# Patient Record
Sex: FEMALE | Race: BLACK OR AFRICAN AMERICAN | Employment: UNEMPLOYED | URBAN - METROPOLITAN AREA
[De-identification: names, ages, dates, MRNs, and addresses within clinical notes are randomized per-mention and may not be internally consistent; named-entity substitution may affect disease eponyms.]

---

## 2017-06-12 ENCOUNTER — HOSPITAL ENCOUNTER (OUTPATIENT)
Dept: LAB | Age: 32
Discharge: HOME | End: 2017-06-12

## 2017-06-12 LAB
HBV SURFACE AB SER QL IA: POSITIVE
HBV SURFACE AB SERPL IA-ACNC: 19.46 MIU/ML
HEP BS AB COMMENT,HBSAC: NORMAL
RUBV IGG SER-IMP: NORMAL
T-SPOT TB TEST (EMPLOYEE),XTBE: NORMAL

## 2017-06-12 PROCEDURE — 86735 MUMPS ANTIBODY: CPT | Performed by: EMERGENCY MEDICINE

## 2017-06-12 PROCEDURE — 36415 COLL VENOUS BLD VENIPUNCTURE: CPT | Performed by: EMERGENCY MEDICINE

## 2017-06-12 PROCEDURE — 86762 RUBELLA ANTIBODY: CPT | Performed by: EMERGENCY MEDICINE

## 2017-06-12 PROCEDURE — 86706 HEP B SURFACE ANTIBODY: CPT | Performed by: EMERGENCY MEDICINE

## 2017-06-12 PROCEDURE — 86787 VARICELLA-ZOSTER ANTIBODY: CPT | Performed by: EMERGENCY MEDICINE

## 2017-06-12 PROCEDURE — 86765 RUBEOLA ANTIBODY: CPT | Performed by: EMERGENCY MEDICINE

## 2017-06-13 LAB
MEV IGG SER IA-ACNC: >300 AU/ML
MUV IGG SER IA-ACNC: 47.3 AU/ML
VZV IGG SER IA-ACNC: 799 INDEX

## 2018-03-12 ENCOUNTER — OFFICE VISIT (OUTPATIENT)
Dept: FAMILY MEDICINE CLINIC | Age: 33
End: 2018-03-12

## 2018-03-12 VITALS
TEMPERATURE: 98.1 F | DIASTOLIC BLOOD PRESSURE: 97 MMHG | WEIGHT: 266 LBS | SYSTOLIC BLOOD PRESSURE: 141 MMHG | HEIGHT: 68 IN | OXYGEN SATURATION: 97 % | BODY MASS INDEX: 40.32 KG/M2 | RESPIRATION RATE: 17 BRPM | HEART RATE: 92 BPM

## 2018-03-12 DIAGNOSIS — E78.5 HYPERLIPIDEMIA, UNSPECIFIED HYPERLIPIDEMIA TYPE: ICD-10-CM

## 2018-03-12 DIAGNOSIS — K21.9 GASTROESOPHAGEAL REFLUX DISEASE, ESOPHAGITIS PRESENCE NOT SPECIFIED: ICD-10-CM

## 2018-03-12 DIAGNOSIS — E55.9 VITAMIN D DEFICIENCY: ICD-10-CM

## 2018-03-12 DIAGNOSIS — R03.0 ELEVATED BP WITHOUT DIAGNOSIS OF HYPERTENSION: ICD-10-CM

## 2018-03-12 DIAGNOSIS — R73.03 PREDIABETES: ICD-10-CM

## 2018-03-12 RX ORDER — VALACYCLOVIR HYDROCHLORIDE 1 G/1
TABLET, FILM COATED ORAL DAILY
COMMUNITY
End: 2018-08-09 | Stop reason: SDUPTHER

## 2018-03-12 RX ORDER — FLUTICASONE FUROATE AND VILANTEROL 200; 25 UG/1; UG/1
1 POWDER RESPIRATORY (INHALATION) DAILY
COMMUNITY
End: 2018-09-10 | Stop reason: SDUPTHER

## 2018-03-12 RX ORDER — MONTELUKAST SODIUM 10 MG/1
10 TABLET ORAL DAILY
COMMUNITY
End: 2018-08-09 | Stop reason: SDUPTHER

## 2018-03-12 RX ORDER — FAMOTIDINE 20 MG/1
20 TABLET, FILM COATED ORAL AS NEEDED
COMMUNITY
End: 2021-04-24

## 2018-03-12 RX ORDER — ALBUTEROL SULFATE 90 UG/1
AEROSOL, METERED RESPIRATORY (INHALATION)
COMMUNITY
End: 2018-04-10 | Stop reason: SDUPTHER

## 2018-03-12 RX ORDER — METFORMIN HYDROCHLORIDE 500 MG/1
TABLET, FILM COATED, EXTENDED RELEASE ORAL DAILY
COMMUNITY
End: 2018-04-18 | Stop reason: SDUPTHER

## 2018-03-12 RX ORDER — CETIRIZINE HCL 10 MG
TABLET ORAL
COMMUNITY

## 2018-03-12 NOTE — MR AVS SNAPSHOT
Cinthia Pinzon [place] [address] [facility] [place]. [address] [facility] [id number] 
[phone] Patient: Supa Piper MRN: [id number] Visit Information Date & Time Provider Department Dept. Phone Encounter #  
 3/12/2018  3:15 PM Ravi Lee, [address] [id number] Follow-up Instructions Return in about 1 month (around 4/12/2018). Upcoming Health Maintenance Date Due DTaP/Tdap/Td series (1 - Tdap) 8/31/2004 PAP AKA CERVICAL CYTOLOGY 8/31/2004 Allergies as of 3/12/2018  Review Complete On: 3/12/2018 By: Ravi Lee MD  
  
 Severity Noted Reaction Type Reactions Latex  03/12/2018    Atopic Dermatitis Nubain [Nalbuphine] Medium 03/12/2018    Hives Sulfa (Sulfonamide Antibiotics) Medium 03/12/2018    Hives Shellfish Derived  03/12/2018    Hives Current Immunizations  Never Reviewed Name Date Influenza Nasal Vaccine (Quad) 10/12/2017 Not reviewed this visit You Were Diagnosed With   
  
 Codes Comments Class 3 obesity without serious comorbidity with body mass index (BMI) of 40.0 to 44.9 in adult, unspecified obesity type ([facility] [address]    -  Primary ICD-10-CM: E66.9, Z68.41 
ICD-9-CM: 278.00, V85.41 Hyperlipidemia, unspecified hyperlipidemia type     ICD-10-CM: E78.5 ICD-9-CM: 272.4 Vitamin D deficiency     ICD-10-CM: E55.9 ICD-9-CM: 268.9 Gastroesophageal reflux disease, esophagitis presence not specified     ICD-10-CM: K21.9 ICD-9-CM: 530.81 Elevated BP without diagnosis of hypertension     ICD-10-CM: R03.0 ICD-9-CM: 796.2 Prediabetes     ICD-10-CM: R73.03 
ICD-9-CM: 790.29 Vitals BP Pulse Temp Resp Height(growth percentile) Weight(growth percentile) (!) 141/97 92 98.1 °F (36.7 °C) (Oral) 17 5' 8\" (1.727 m) 266 lb (120.7 kg) LMP SpO2 BMI Smoking Status 02/17/2017 97% 40.45 kg/m2 Never Smoker Vitals History BMI and BSA Data Body Mass Index Body Surface Area 40.45 kg/m 2 2.41 m 2 Preferred Pharmacy Pharmacy Name Phone [facility] [address] [place], [facility] [phone] Your Updated Medication List  
  
   
This list is accurate as of 3/12/18  4:11 PM.  Always use your most recent med list.  
  
  
  
  
 albuterol 90 mcg/actuation inhaler Commonly known as:  PROVENTIL HFA, VENTOLIN HFA, PROAIR HFA Take  by inhalation. BREO ELLIPTA 200-25 mcg/dose inhaler Generic drug:  fluticasone-vilanterol Take 1 Puff by inhalation daily. metFORMIN 500 mg Tg24 24 hour tablet Commonly known asGerard Ana Luisa ER Take  by mouth daily. PEPCID 20 mg tablet Generic drug:  famotidine Take 20 mg by mouth as needed. SINGULAIR 10 mg tablet Generic drug:  montelukast  
Take 10 mg by mouth daily. VALTREX 1 gram tablet Generic drug:  valACYclovir Take  by mouth daily. ZyrTEC 10 mg tablet Generic drug:  cetirizine Take  by mouth. Follow-up Instructions Return in about 1 month (around 4/12/2018). To-Do List   
 03/12/2018 Lab:  CBC WITH AUTOMATED DIFF   
  
 03/12/2018 Lab:  HEMOGLOBIN A1C WITH EAG   
  
 03/12/2018 Lab:  LIPID PANEL   
  
 03/12/2018 Lab:  METABOLIC PANEL, COMPREHENSIVE   
  
 03/12/2018 Lab:  TSH 3RD GENERATION   
  
 03/12/2018 Lab:  VITAMIN D, 25 HYDROXY Patient Instructions Starting a Weight Loss Plan: Care Instructions Your Care Instructions If you are thinking about losing weight, it can be hard to know where to start. Your doctor can help you set up a weight loss plan that best meets your needs. You may want to take a class on nutrition or exercise, or join a weight loss support group.  If you have questions about how to make changes to your eating or exercise habits, ask your doctor about seeing a registered dietitian or an exercise specialist. 
 It can be a big challenge to lose weight. But you do not have to make huge changes at once. Make small changes, and stick with them. When those changes become habit, add a few more changes. If you do not think you are ready to make changes right now, try to pick a date in the future. Make an appointment to see your doctor to discuss whether the time is right for you to start a plan. Follow-up care is a key part of your treatment and safety. Be sure to make and go to all appointments, and call your doctor if you are having problems. It's also a good idea to know your test results and keep a list of the medicines you take. How can you care for yourself at home? · Set realistic goals. Many people expect to lose much more weight than is likely. A weight loss of 5% to 10% of your body weight may be enough to improve your health. · Get family and friends involved to provide support. Talk to them about why you are trying to lose weight, and ask them to help. They can help by participating in exercise and having meals with you, even if they may be eating something different. · Find what works best for you. If you do not have time or do not like to cook, a program that offers meal replacement bars or shakes may be better for you. Or if you like to prepare meals, finding a plan that includes daily menus and recipes may be best. 
· Ask your doctor about other health professionals who can help you achieve your weight loss goals. ¨ A dietitian can help you make healthy changes in your diet. ¨ An exercise specialist or  can help you develop a safe and effective exercise program. 
¨ A counselor or psychiatrist can help you cope with issues such as depression, anxiety, or family problems that can make it hard to focus on weight loss. · Consider joining a support group for people who are trying to lose weight. Your doctor can suggest groups in your area. Where can you learn more? Go to http://mateo-claudia.info/. Enter O033 in the search box to learn more about \"Starting a Weight Loss Plan: Care Instructions. \" Current as of: October 13, 2016 Content Version: 11.4 © 5041-8973 Minefold. Care instructions adapted under license by EximForce (which disclaims liability or warranty for this information). If you have questions about a medical condition or this instruction, always ask your healthcare professional. [address] any warranty or liability for your use of this information. Learning About Low-Carbohydrate Diets for Weight Loss What is a low-carbohydrate diet? Low-carb diets avoid foods that are high in carbohydrate. These high-carb foods include pasta, bread, rice, cereal, fruits, and starchy vegetables. Instead, these diets usually have you eat foods that are high in fat and protein. Many people lose weight quickly on a low-carb diet. But the early weight loss is water. People on this diet often gain the weight back after they start eating carbs again. Not all diet plans are safe or work well. A lot of the evidence shows that low-carb diets aren't healthy. That's because these diets often don't include healthy foods like fruits and vegetables. Losing weight safely means balancing protein, fat, and carbs with every meal and snack. And low-carb diets don't always provide the vitamins, minerals, and fiber you need. If you have a serious medical condition, talk to your doctor before you try any diet. These conditions include kidney disease, heart disease, type 2 diabetes, high cholesterol, and high blood pressure. If you are pregnant, it may not be safe for your baby if you are on a low-carb diet. How can you lose weight safely? You might have heard that a diet plan helped another person lose weight. But that doesn't mean that it will work for you. It is very hard to stay on a diet that includes lots of big changes in your eating habits. If you want to get to a healthy weight and stay there, making healthy lifestyle changes will often work better than dieting. These steps can help. · Make a plan for change. Work with your doctor to create a plan that is right for you. · See a dietitian. He or she can show you how to make healthy changes in your eating habits. · Manage stress. If you have a lot of stress in your life, it can be hard to focus on making healthy changes to your daily habits. · Track your food and activity. You are likely to do better at losing weight if you keep track of what you eat and what you do. Follow-up care is a key part of your treatment and safety. Be sure to make and go to all appointments, and call your doctor if you are having problems. It's also a good idea to know your test results and keep a list of the medicines you take. Where can you learn more? Go to http://mateo-claudia.info/. Enter A121 in the search box to learn more about \"Learning About Low-Carbohydrate Diets for Weight Loss. \" Current as of: May 12, 2017 Content Version: 11.4 © 2495-8644 Healthwise, Tripvisto. Care instructions adapted under license by KidsCash (which disclaims liability or warranty for this information). If you have questions about a medical condition or this instruction, always ask your healthcare professional. [place] [address] any warranty or liability for your use of this information. Learning About Cutting Calories How do calories affect your weight? Food gives your body energy. Energy from the food you eat is measured in calories. This energy keeps your heart beating, your brain active, and your muscles working. Your body needs a certain number of calories each day. After your body uses the calories it needs, it stores extra calories as fat. To lose weight safely, you have to eat fewer calories while eating in a healthy way. How many calories do you need each day? The more active you are, the more calories you need. When you are less active, you need fewer calories. How many calories you need each day also depends on several things, including your age and whether you are male or female. Here are some general guidelines for adults: 
· Less active women and older adults need 1,600 to 2,000 calories each day. · Active women and less active men need 2,000 to 2,400 calories each day. · Active men need 2,400 to 3,000 calories each day. How can you cut calories and eat healthy meals? Whole grains, vegetables and fruits, and dried beans are good lower-calorie foods. They give you lots of nutrients and fiber. And they fill you up. Sweets, energy drinks, and soda pop are high in calories. They give you few nutrients and no fiber. Try to limit soda pop, fruit juice, and energy drinks. Drink water instead. Some fats can be part of a healthy diet. But cutting back on fats from highly processed foods like fast foods and many snack foods is a good way to lower the calories in your diet. Also, use smaller amounts of fats like butter, margarine, salad dressing, and mayonnaise. Add fresh garlic, lemon, or herbs to your meals to add flavor without adding fat. Meats and dairy products can be a big source of hidden fats. Try to choose lean or low-fat versions of these products. Fat-free cookies, candies, chips, and frozen treats can still be high in sugar and calories. Some fat-free foods have more calories than regular ones. Eat fat-free treats in moderation, as you would other foods. If your favorite foods are high in fat, salt, sugar, or calories, limit how often you eat them. Eat smaller servings, or look for healthy substitutes. Fill up on fruits, vegetables, and whole grains. Eating at home · Use meat as a side dish instead of as the main part of your meal. 
· Try main dishes that use whole wheat pasta, brown rice, dried beans, or vegetables. · Find ways to cook with little or no fat, such as broiling, steaming, or grilling. · Use cooking spray instead of oil. If you use oil, use a monounsaturated oil, such as canola or olive oil. · Trim fat from meats before you cook them. · Drain off fat after you brown the meat or while you roast it. · Chill soups and stews after you cook them. Then skim the fat off the top after it hardens. Eating out · Order foods that are broiled or poached rather than fried or breaded. · Cut back on the amount of butter or margarine that you use on bread. · Order sauces, gravies, and salad dressings on the side, and use only a little. · When you order pasta, choose tomato sauce rather than cream sauce. · Ask for salsa with your baked potato instead of sour cream, butter, cheese, or tony. · Order meals in a small size instead of upgrading to a large. · Share an entree, or take part of your food home to eat as another meal. 
· Share appetizers and desserts. Where can you learn more? Go to http://mateo-claudia.info/. Enter 99 [id number] in the search box to learn more about \"Learning About Cutting Calories. \" Current as of: May 12, 2017 Content Version: 11.4 © 4247-6078 Vimbly. Care instructions adapted under license by Fluid-1 (which disclaims liability or warranty for this information). If you have questions about a medical condition or this instruction, always ask your healthcare professional. [place] [address] any warranty or liability for your use of this information. DASH Diet: Care Instructions Your Care Instructions The DASH diet is an eating plan that can help lower your blood pressure. DASH stands for Dietary Approaches to Stop Hypertension. Hypertension is high blood pressure. The DASH diet focuses on eating foods that are high in calcium, potassium, and magnesium. These nutrients can lower blood pressure. The foods that are highest in these nutrients are fruits, vegetables, low-fat dairy products, nuts, seeds, and legumes. But taking calcium, potassium, and magnesium supplements instead of eating foods that are high in those nutrients does not have the same effect. The DASH diet also includes whole grains, fish, and poultry. The DASH diet is one of several lifestyle changes your doctor may recommend to lower your high blood pressure. Your doctor may also want you to decrease the amount of sodium in your diet. Lowering sodium while following the DASH diet can lower blood pressure even further than just the DASH diet alone. Follow-up care is a key part of your treatment and safety. Be sure to make and go to all appointments, and call your doctor if you are having problems. It's also a good idea to know your test results and keep a list of the medicines you take. How can you care for yourself at home? Following the DASH diet · Eat 4 to 5 servings of fruit each day. A serving is 1 medium-sized piece of fruit, ½ cup chopped or canned fruit, 1/4 cup dried fruit, or 4 ounces (½ cup) of fruit juice. Choose fruit more often than fruit juice. · Eat 4 to 5 servings of vegetables each day. A serving is 1 cup of lettuce or raw leafy vegetables, ½ cup of chopped or cooked vegetables, or 4 ounces (½ cup) of vegetable juice. Choose vegetables more often than vegetable juice. · Get 2 to 3 servings of low-fat and fat-free dairy each day. A serving is 8 ounces of milk, 1 cup of yogurt, or 1 ½ ounces of cheese. · Eat 6 to 8 servings of grains each day. A serving is 1 slice of bread, 1 ounce of dry cereal, or ½ cup of cooked rice, pasta, or cooked cereal. Try to choose whole-grain products as much as possible. · Limit lean meat, poultry, and fish to 2 servings each day.  A serving is 3 ounces, about the size of a deck of cards. · Eat 4 to 5 servings of nuts, seeds, and legumes (cooked dried beans, lentils, and split peas) each week. A serving is 1/3 cup of nuts, 2 tablespoons of seeds, or ½ cup of cooked beans or peas. · Limit fats and oils to 2 to 3 servings each day. A serving is 1 teaspoon of vegetable oil or 2 tablespoons of salad dressing. · Limit sweets and added sugars to 5 servings or less a week. A serving is 1 tablespoon jelly or jam, ½ cup sorbet, or 1 cup of lemonade. · Eat less than 2,300 milligrams (mg) of sodium a day. If you limit your sodium to 1,500 mg a day, you can lower your blood pressure even more. Tips for success · Start small. Do not try to make dramatic changes to your diet all at once. You might feel that you are missing out on your favorite foods and then be more likely to not follow the plan. Make small changes, and stick with them. Once those changes become habit, add a few more changes. · Try some of the following: ¨ Make it a goal to eat a fruit or vegetable at every meal and at snacks. This will make it easy to get the recommended amount of fruits and vegetables each day. ¨ Try yogurt topped with fruit and nuts for a snack or healthy dessert. ¨ Add lettuce, tomato, cucumber, and onion to sandwiches. ¨ Combine a ready-made pizza crust with low-fat mozzarella cheese and lots of vegetable toppings. Try using tomatoes, squash, spinach, broccoli, carrots, cauliflower, and onions. ¨ Have a variety of cut-up vegetables with a low-fat dip as an appetizer instead of chips and dip. ¨ Sprinkle sunflower seeds or chopped almonds over salads. Or try adding chopped walnuts or almonds to cooked vegetables. ¨ Try some vegetarian meals using beans and peas. Add garbanzo or kidney beans to salads. Make burritos and tacos with mashed powers beans or black beans. Where can you learn more? Go to http://galindo-claudia.info/. Enter P420 in the search box to learn more about \"DASH Diet: Care Instructions. \" Current as of: September 21, 2016 Content Version: 11.4 © 0481-9578 Identity Engines. Care instructions adapted under license by Solidagex (which disclaims liability or warranty for this information). If you have questions about a medical condition or this instruction, always ask your healthcare professional. [address] any warranty or liability for your use of this information. Low Sodium Diet (2,000 Milligram): Care Instructions Your Care Instructions Too much sodium causes your body to hold on to extra water. This can raise your blood pressure and force your heart and kidneys to work harder. In very serious cases, this could cause you to be put in the hospital. It might even be life-threatening. By limiting sodium, you will feel better and lower your risk of serious problems. The most common source of sodium is salt. People get most of the salt in their diet from canned, prepared, and packaged foods. Fast food and restaurant meals also are very high in sodium. Your doctor will probably limit your sodium to less than 2,000 milligrams (mg) a day. This limit counts all the sodium in prepared and packaged foods and any salt you add to your food. Follow-up care is a key part of your treatment and safety. Be sure to make and go to all appointments, and call your doctor if you are having problems. It's also a good idea to know your test results and keep a list of the medicines you take. How can you care for yourself at home? Read food labels · Read labels on cans and food packages. The labels tell you how much sodium is in each serving. Make sure that you look at the serving size. If you eat more than the serving size, you have eaten more sodium. · Food labels also tell you the Percent Daily Value for sodium. Choose products with low Percent Daily Values for sodium. · Be aware that sodium can come in forms other than salt, including monosodium glutamate (MSG), sodium citrate, and sodium bicarbonate (baking soda). MSG is often added to Asian food. When you eat out, you can sometimes ask for food without MSG or added salt. Buy low-sodium foods · Buy foods that are labeled \"unsalted\" (no salt added), \"sodium-free\" (less than 5 mg of sodium per serving), or \"low-sodium\" (less than 140 mg of sodium per serving). Foods labeled \"reduced-sodium\" and \"light sodium\" may still have too much sodium. Be sure to read the label to see how much sodium you are getting. · Buy fresh vegetables, or frozen vegetables without added sauces. Buy low-sodium versions of canned vegetables, soups, and other canned goods. Prepare low-sodium meals · Cut back on the amount of salt you use in cooking. This will help you adjust to the taste. Do not add salt after cooking. One teaspoon of salt has about 2,300 mg of sodium. · Take the salt shaker off the table. · Flavor your food with garlic, lemon juice, onion, vinegar, herbs, and spices. Do not use soy sauce, lite soy sauce, steak sauce, onion salt, garlic salt, celery salt, mustard, or ketchup on your food. · Use low-sodium salad dressings, sauces, and ketchup. Or make your own salad dressings and sauces without adding salt. · Use less salt (or none) when recipes call for it. You can often use half the salt a recipe calls for without losing flavor. Other foods such as rice, pasta, and grains do not need added salt. · Rinse canned vegetables, and cook them in fresh water. This removes some-but not all-of the salt. · Avoid water that is naturally high in sodium or that has been treated with water softeners, which add sodium. Call your local water company to find out the sodium content of your water supply. If you buy bottled water, read the label and choose a sodium-free brand. Avoid high-sodium foods · Avoid eating: ¨ Smoked, cured, salted, and canned meat, fish, and poultry. ¨ Ham, tony, hot dogs, and luncheon meats. ¨ Regular, hard, and processed cheese and regular peanut butter. ¨ Crackers with salted tops, and other salted snack foods such as pretzels, chips, and salted popcorn. ¨ Frozen prepared meals, unless labeled low-sodium. ¨ Canned and dried soups, broths, and bouillon, unless labeled sodium-free or low-sodium. ¨ Canned vegetables, unless labeled sodium-free or low-sodium. ¨ Western Nadia fries, pizza, tacos, and other fast foods. ¨ Pickles, olives, ketchup, and other condiments, especially soy sauce, unless labeled sodium-free or low-sodium. Where can you learn more? Go to http://mateo-claudia.info/. Enter N453 in the search box to learn more about \"Low Sodium Diet (2,000 Milligram): Care Instructions. \" Current as of: May 12, 2017 Content Version: 11.4 © 1884-5342 [email or web address]. Care instructions adapted under license by Get Together (which disclaims liability or warranty for this information). If you have questions about a medical condition or this instruction, always ask your healthcare professional. [address] any warranty or liability for your use of this information. How to Read a Food Label to Limit Sodium: Care Instructions Your Care Instructions Sodium causes your body to hold on to extra water. This can raise your blood pressure and force your heart and kidneys to work harder. In very serious cases, this could cause you to be put in the hospital. It might even be life-threatening. By limiting sodium, you will feel better and lower your risk of serious problems. Processed foods, fast food, and restaurant foods are the major sources of dietary sodium. The most common name for sodium is salt. Try to limit how much sodium you eat to less than 2,300 milligrams (mg) a day.  If you limit your sodium to 1,500 mg a day, you can lower your blood pressure even more. This limit counts all the salt that you eat in foods you cook or in packaged foods. Keep a list of everything you eat and drink. Follow-up care is a key part of your treatment and safety. Be sure to make and go to all appointments, and call your doctor if you are having problems. It's also a good idea to know your test results and keep a list of the medicines you take. How can you care for yourself at home? Read ingredient lists on food labels · Read the list of ingredients on food labels to help you find how much sodium is in a food. The label lists the ingredients in a food in descending order (from the most to the least). If salt or sodium is high on the list, there may be a lot of sodium in the food. · Know that sodium has different names. Sodium is also called monosodium glutamate (MSG, common in [facility] food), sodium citrate, sodium alginate, sodium hydroxide, and sodium phosphate. Read Nutrition Facts labels · On most foods, there is a Nutrition Facts label. This will tell you how much sodium is in one serving of food. Look at both the serving size and the sodium amount. The serving size is located at the top of the label, usually right under the \"Nutrition Facts\" title. The amount of sodium is given in the list under the title. It is given in milligrams (mg). ¨ Check the serving size carefully. A single serving is often very small, and you may eat more than one serving. If this is the case, you will eat more sodium than listed on the label. For example, if the serving size for a canned soup is 1 cup and the sodium amount is 470 mg, if you have 2 cups you will eat 940 mg of sodium. · The nutrition facts for fresh fruits and vegetables are not listed on the food. They may be listed somewhere in the store. These foods usually have no sodium or low sodium.  
· The Nutrition Facts label also gives you the Percent Daily Value for sodium. This is how much of the recommended amount of sodium a serving contains. The daily value for sodium is less than 2,300 mg. So if the Percent Daily Value says 50%, this means one serving is giving you half of this, or 1,150 mg. Buy low-sodium foods · Look for foods that are made with less sodium. Watch for the following words on the label. ¨ \"Unsalted\" means there is no sodium added to the food. But there may be sodium already in the food naturally. ¨ \"Sodium-free\" means a serving has less than 5 mg of sodium. ¨ \"Very low sodium\" means a serving has 35 mg or less of sodium. ¨ \"Low-sodium\" means a serving has 140 mg or less of sodium. · \"Reduced-sodium\" means that there is 25% less sodium than what the food normally has. This is still usually too much sodium. Try not to buy foods with this on the label. · Buy fresh vegetables, or frozen vegetables without added sauces. Buy low-sodium versions of canned vegetables, soups, and other canned goods. Where can you learn more? Go to http://mateo-claudia.info/. Enter 26 [id number] in the search box to learn more about \"How to Read a Food Label to Limit Sodium: Care Instructions. \" Current as of: May 12, 2017 Content Version: 11.4 © 0782-1419 [employer]. Care instructions adapted under license by [employer] (which disclaims liability or warranty for this information). If you have questions about a medical condition or this instruction, always ask your healthcare professional. [place] [address] any warranty or liability for your use of this information. Introducing [facility]! Dear Batsheva Schafer: Thank you for requesting a [employer] account. Our records indicate that you already have an active [employer] account. You can access your account anytime at [email or web address]. [employer] Did you know that you can access your hospital and ER discharge instructions at any time in Spark[employer]yt? You can also review all of your test results from your hospital stay or ER visit. Additional Information If you have questions, please visit the Frequently Asked Questions section of the [email or web address] website at [email or web address] Remember, [email or web address] is NOT to be used for urgent needs. For medical emergencies, dial 911. Now available from your iPhone and Android! Please provide this summary of care documentation to your next provider. If you have any questions after today's visit, please call [phone].

## 2018-03-12 NOTE — PATIENT INSTRUCTIONS
Starting a Weight Loss Plan: Care Instructions  Your Care Instructions    If you are thinking about losing weight, it can be hard to know where to start. Your doctor can help you set up a weight loss plan that best meets your needs. You may want to take a class on nutrition or exercise, or join a weight loss support group. If you have questions about how to make changes to your eating or exercise habits, ask your doctor about seeing a registered dietitian or an exercise specialist.  It can be a big challenge to lose weight. But you do not have to make huge changes at once. Make small changes, and stick with them. When those changes become habit, add a few more changes. If you do not think you are ready to make changes right now, try to pick a date in the future. Make an appointment to see your doctor to discuss whether the time is right for you to start a plan. Follow-up care is a key part of your treatment and safety. Be sure to make and go to all appointments, and call your doctor if you are having problems. It's also a good idea to know your test results and keep a list of the medicines you take. How can you care for yourself at home? · Set realistic goals. Many people expect to lose much more weight than is likely. A weight loss of 5% to 10% of your body weight may be enough to improve your health. · Get family and friends involved to provide support. Talk to them about why you are trying to lose weight, and ask them to help. They can help by participating in exercise and having meals with you, even if they may be eating something different. · Find what works best for you. If you do not have time or do not like to cook, a program that offers meal replacement bars or shakes may be better for you. Or if you like to prepare meals, finding a plan that includes daily menus and recipes may be best.  · Ask your doctor about other health professionals who can help you achieve your weight loss goals.   ¨ A dietitian can help you make healthy changes in your diet. ¨ An exercise specialist or  can help you develop a safe and effective exercise program.  ¨ A counselor or psychiatrist can help you cope with issues such as depression, anxiety, or family problems that can make it hard to focus on weight loss. · Consider joining a support group for people who are trying to lose weight. Your doctor can suggest groups in your area. Where can you learn more? Go to http://mateo-claudia.info/. Enter L141 in the search box to learn more about \"Starting a Weight Loss Plan: Care Instructions. \"  Current as of: October 13, 2016  Content Version: 11.4  © 9549-2918 [employer]. Care instructions adapted under license by [employer] (which disclaims liability or warranty for this information). If you have questions about a medical condition or this instruction, always ask your healthcare professional. [place] [address] any warranty or liability for your use of this information. Learning About Low-Carbohydrate Diets for Weight Loss  What is a low-carbohydrate diet? Low-carb diets avoid foods that are high in carbohydrate. These high-carb foods include pasta, bread, rice, cereal, fruits, and starchy vegetables. Instead, these diets usually have you eat foods that are high in fat and protein. Many people lose weight quickly on a low-carb diet. But the early weight loss is water. People on this diet often gain the weight back after they start eating carbs again. Not all diet plans are safe or work well. A lot of the evidence shows that low-carb diets aren't healthy. That's because these diets often don't include healthy foods like fruits and vegetables. Losing weight safely means balancing protein, fat, and carbs with every meal and snack. And low-carb diets don't always provide the vitamins, minerals, and fiber you need.   If you have a serious medical condition, talk to your doctor before you try any diet. These conditions include kidney disease, heart disease, type 2 diabetes, high cholesterol, and high blood pressure. If you are pregnant, it may not be safe for your baby if you are on a low-carb diet. How can you lose weight safely? You might have heard that a diet plan helped another person lose weight. But that doesn't mean that it will work for you. It is very hard to stay on a diet that includes lots of big changes in your eating habits. If you want to get to a healthy weight and stay there, making healthy lifestyle changes will often work better than dieting. These steps can help. · Make a plan for change. Work with your doctor to create a plan that is right for you. · See a dietitian. He or she can show you how to make healthy changes in your eating habits. · Manage stress. If you have a lot of stress in your life, it can be hard to focus on making healthy changes to your daily habits. · Track your food and activity. You are likely to do better at losing weight if you keep track of what you eat and what you do. Follow-up care is a key part of your treatment and safety. Be sure to make and go to all appointments, and call your doctor if you are having problems. It's also a good idea to know your test results and keep a list of the medicines you take. Where can you learn more? Go to http://mateo-claudia.info/. Enter A121 in the search box to learn more about \"Learning About Low-Carbohydrate Diets for Weight Loss. \"  Current as of: May 12, 2017  Content Version: 11.4  © 3637-8229 [employer]. Care instructions adapted under license by [employer] (which disclaims liability or warranty for this information).  If you have questions about a medical condition or this instruction, always ask your healthcare professional. [address] any warranty or liability for your use of this information. Learning About Cutting Calories  How do calories affect your weight? Food gives your body energy. Energy from the food you eat is measured in calories. This energy keeps your heart beating, your brain active, and your muscles working. Your body needs a certain number of calories each day. After your body uses the calories it needs, it stores extra calories as fat. To lose weight safely, you have to eat fewer calories while eating in a healthy way. How many calories do you need each day? The more active you are, the more calories you need. When you are less active, you need fewer calories. How many calories you need each day also depends on several things, including your age and whether you are male or female. Here are some general guidelines for adults:  · Less active women and older adults need 1,600 to 2,000 calories each day. · Active women and less active men need 2,000 to 2,400 calories each day. · Active men need 2,400 to 3,000 calories each day. How can you cut calories and eat healthy meals? Whole grains, vegetables and fruits, and dried beans are good lower-calorie foods. They give you lots of nutrients and fiber. And they fill you up. Sweets, energy drinks, and soda pop are high in calories. They give you few nutrients and no fiber. Try to limit soda pop, fruit juice, and energy drinks. Drink water instead. Some fats can be part of a healthy diet. But cutting back on fats from highly processed foods like fast foods and many snack foods is a good way to lower the calories in your diet. Also, use smaller amounts of fats like butter, margarine, salad dressing, and mayonnaise. Add fresh garlic, lemon, or herbs to your meals to add flavor without adding fat. Meats and dairy products can be a big source of hidden fats. Try to choose lean or low-fat versions of these products. Fat-free cookies, candies, chips, and frozen treats can still be high in sugar and calories.  Some fat-free foods have more calories than regular ones. Eat fat-free treats in moderation, as you would other foods. If your favorite foods are high in fat, salt, sugar, or calories, limit how often you eat them. Eat smaller servings, or look for healthy substitutes. Fill up on fruits, vegetables, and whole grains. Eating at home  · Use meat as a side dish instead of as the main part of your meal.  · Try main dishes that use whole wheat pasta, brown rice, dried beans, or vegetables. · Find ways to cook with little or no fat, such as broiling, steaming, or grilling. · Use cooking spray instead of oil. If you use oil, use a monounsaturated oil, such as canola or olive oil. · Trim fat from meats before you cook them. · Drain off fat after you brown the meat or while you roast it. · Chill soups and stews after you cook them. Then skim the fat off the top after it hardens. Eating out  · Order foods that are broiled or poached rather than fried or breaded. · Cut back on the amount of butter or margarine that you use on bread. · Order sauces, gravies, and salad dressings on the side, and use only a little. · When you order pasta, choose tomato sauce rather than cream sauce. · Ask for salsa with your baked potato instead of sour cream, butter, cheese, or tony. · Order meals in a small size instead of upgrading to a large. · Share an entree, or take part of your food home to eat as another meal.  · Share appetizers and desserts. Where can you learn more? Go to http://mateo-claudia.info/. Enter 99 [id number] in the search box to learn more about \"Learning About Cutting Calories. \"  Current as of: May 12, 2017  Content Version: 11.4  © 5301-9357 [employer], [employer]. Care instructions adapted under license by [email or web address] (which disclaims liability or warranty for this information).  If you have questions about a medical condition or this instruction, always ask your healthcare professional. [facility], [facility] disclaims any warranty or liability for your use of this information. DASH Diet: Care Instructions  Your Care Instructions    The DASH diet is an eating plan that can help lower your blood pressure. DASH stands for Dietary Approaches to Stop Hypertension. Hypertension is high blood pressure. The DASH diet focuses on eating foods that are high in calcium, potassium, and magnesium. These nutrients can lower blood pressure. The foods that are highest in these nutrients are fruits, vegetables, low-fat dairy products, nuts, seeds, and legumes. But taking calcium, potassium, and magnesium supplements instead of eating foods that are high in those nutrients does not have the same effect. The DASH diet also includes whole grains, fish, and poultry. The DASH diet is one of several lifestyle changes your doctor may recommend to lower your high blood pressure. Your doctor may also want you to decrease the amount of sodium in your diet. Lowering sodium while following the DASH diet can lower blood pressure even further than just the DASH diet alone. Follow-up care is a key part of your treatment and safety. Be sure to make and go to all appointments, and call your doctor if you are having problems. It's also a good idea to know your test results and keep a list of the medicines you take. How can you care for yourself at home? Following the DASH diet  · Eat 4 to 5 servings of fruit each day. A serving is 1 medium-sized piece of fruit, ½ cup chopped or canned fruit, 1/4 cup dried fruit, or 4 ounces (½ cup) of fruit juice. Choose fruit more often than fruit juice. · Eat 4 to 5 servings of vegetables each day. A serving is 1 cup of lettuce or raw leafy vegetables, ½ cup of chopped or cooked vegetables, or 4 ounces (½ cup) of vegetable juice. Choose vegetables more often than vegetable juice. · Get 2 to 3 servings of low-fat and fat-free dairy each day.  A serving is 8 ounces of milk, 1 cup of yogurt, or 1 ½ ounces of cheese. · Eat 6 to 8 servings of grains each day. A serving is 1 slice of bread, 1 ounce of dry cereal, or ½ cup of cooked rice, pasta, or cooked cereal. Try to choose whole-grain products as much as possible. · Limit lean meat, poultry, and fish to 2 servings each day. A serving is 3 ounces, about the size of a deck of cards. · Eat 4 to 5 servings of nuts, seeds, and legumes (cooked dried beans, lentils, and split peas) each week. A serving is 1/3 cup of nuts, 2 tablespoons of seeds, or ½ cup of cooked beans or peas. · Limit fats and oils to 2 to 3 servings each day. A serving is 1 teaspoon of vegetable oil or 2 tablespoons of salad dressing. · Limit sweets and added sugars to 5 servings or less a week. A serving is 1 tablespoon jelly or jam, ½ cup sorbet, or 1 cup of lemonade. · Eat less than 2,300 milligrams (mg) of sodium a day. If you limit your sodium to 1,500 mg a day, you can lower your blood pressure even more. Tips for success  · Start small. Do not try to make dramatic changes to your diet all at once. You might feel that you are missing out on your favorite foods and then be more likely to not follow the plan. Make small changes, and stick with them. Once those changes become habit, add a few more changes. · Try some of the following:  ¨ Make it a goal to eat a fruit or vegetable at every meal and at snacks. This will make it easy to get the recommended amount of fruits and vegetables each day. ¨ Try yogurt topped with fruit and nuts for a snack or healthy dessert. ¨ Add lettuce, tomato, cucumber, and onion to sandwiches. ¨ Combine a ready-made pizza crust with low-fat mozzarella cheese and lots of vegetable toppings. Try using tomatoes, squash, spinach, broccoli, carrots, cauliflower, and onions. ¨ Have a variety of cut-up vegetables with a low-fat dip as an appetizer instead of chips and dip. ¨ Sprinkle sunflower seeds or chopped almonds over salads.  Or try adding chopped walnuts or almonds to cooked vegetables. ¨ Try some vegetarian meals using beans and peas. Add garbanzo or kidney beans to salads. Make burritos and tacos with mashed powers beans or black beans. Where can you learn more? Go to http://mateo-claudia.info/. Enter O899 in the search box to learn more about \"DASH Diet: Care Instructions. \"  Current as of: September 21, 2016  Content Version: 11.4  © 0506-6694 [employer]. Care instructions adapted under license by [employer] (which disclaims liability or warranty for this information). If you have questions about a medical condition or this instruction, always ask your healthcare professional. [address] any warranty or liability for your use of this information. Low Sodium Diet (2,000 Milligram): Care Instructions  Your Care Instructions    Too much sodium causes your body to hold on to extra water. This can raise your blood pressure and force your heart and kidneys to work harder. In very serious cases, this could cause you to be put in the hospital. It might even be life-threatening. By limiting sodium, you will feel better and lower your risk of serious problems. The most common source of sodium is salt. People get most of the salt in their diet from canned, prepared, and packaged foods. Fast food and restaurant meals also are very high in sodium. Your doctor will probably limit your sodium to less than 2,000 milligrams (mg) a day. This limit counts all the sodium in prepared and packaged foods and any salt you add to your food. Follow-up care is a key part of your treatment and safety. Be sure to make and go to all appointments, and call your doctor if you are having problems. It's also a good idea to know your test results and keep a list of the medicines you take. How can you care for yourself at home? Read food labels  · Read labels on cans and food packages.  The labels tell you how much sodium is in each serving. Make sure that you look at the serving size. If you eat more than the serving size, you have eaten more sodium. · Food labels also tell you the Percent Daily Value for sodium. Choose products with low Percent Daily Values for sodium. · Be aware that sodium can come in forms other than salt, including monosodium glutamate (MSG), sodium citrate, and sodium bicarbonate (baking soda). MSG is often added to Asian food. When you eat out, you can sometimes ask for food without MSG or added salt. Buy low-sodium foods  · Buy foods that are labeled \"unsalted\" (no salt added), \"sodium-free\" (less than 5 mg of sodium per serving), or \"low-sodium\" (less than 140 mg of sodium per serving). Foods labeled \"reduced-sodium\" and \"light sodium\" may still have too much sodium. Be sure to read the label to see how much sodium you are getting. · Buy fresh vegetables, or frozen vegetables without added sauces. Buy low-sodium versions of canned vegetables, soups, and other canned goods. Prepare low-sodium meals  · Cut back on the amount of salt you use in cooking. This will help you adjust to the taste. Do not add salt after cooking. One teaspoon of salt has about 2,300 mg of sodium. · Take the salt shaker off the table. · Flavor your food with garlic, lemon juice, onion, vinegar, herbs, and spices. Do not use soy sauce, lite soy sauce, steak sauce, onion salt, garlic salt, celery salt, mustard, or ketchup on your food. · Use low-sodium salad dressings, sauces, and ketchup. Or make your own salad dressings and sauces without adding salt. · Use less salt (or none) when recipes call for it. You can often use half the salt a recipe calls for without losing flavor. Other foods such as rice, pasta, and grains do not need added salt. · Rinse canned vegetables, and cook them in fresh water. This removes some-but not all-of the salt.   · Avoid water that is naturally high in sodium or that has been treated with water softeners, which add sodium. Call your local water company to find out the sodium content of your water supply. If you buy bottled water, read the label and choose a sodium-free brand. Avoid high-sodium foods  · Avoid eating:  ¨ Smoked, cured, salted, and canned meat, fish, and poultry. ¨ Ham, tony, hot dogs, and luncheon meats. ¨ Regular, hard, and processed cheese and regular peanut butter. ¨ Crackers with salted tops, and other salted snack foods such as pretzels, chips, and salted popcorn. ¨ Frozen prepared meals, unless labeled low-sodium. ¨ Canned and dried soups, broths, and bouillon, unless labeled sodium-free or low-sodium. ¨ Canned vegetables, unless labeled sodium-free or low-sodium. ¨ Western Nadia fries, pizza, tacos, and other fast foods. ¨ Pickles, olives, ketchup, and other condiments, especially soy sauce, unless labeled sodium-free or low-sodium. Where can you learn more? Go to http://mateoYeti Dataclaudia.info/. Enter F549 in the search box to learn more about \"Low Sodium Diet (2,000 Milligram): Care Instructions. \"  Current as of: May 12, 2017  Content Version: 11.4  © 6255-2785 [employer]. Care instructions adapted under license by [employer] (which disclaims liability or warranty for this information). If you have questions about a medical condition or this instruction, always ask your healthcare professional. [place] [address] any warranty or liability for your use of this information. How to Read a Food Label to Limit Sodium: Care Instructions  Your Care Instructions  Sodium causes your body to hold on to extra water. This can raise your blood pressure and force your heart and kidneys to work harder. In very serious cases, this could cause you to be put in the hospital. It might even be life-threatening. By limiting sodium, you will feel better and lower your risk of serious problems.   Processed foods, fast food, and restaurant foods are the major sources of dietary sodium. The most common name for sodium is salt. Try to limit how much sodium you eat to less than 2,300 milligrams (mg) a day. If you limit your sodium to 1,500 mg a day, you can lower your blood pressure even more. This limit counts all the salt that you eat in foods you cook or in packaged foods. Keep a list of everything you eat and drink. Follow-up care is a key part of your treatment and safety. Be sure to make and go to all appointments, and call your doctor if you are having problems. It's also a good idea to know your test results and keep a list of the medicines you take. How can you care for yourself at home? Read ingredient lists on food labels  · Read the list of ingredients on food labels to help you find how much sodium is in a food. The label lists the ingredients in a food in descending order (from the most to the least). If salt or sodium is high on the list, there may be a lot of sodium in the food. · Know that sodium has different names. Sodium is also called monosodium glutamate (MSG, common in [facility] food), sodium citrate, sodium alginate, sodium hydroxide, and sodium phosphate. Read Nutrition Facts labels  · On most foods, there is a Nutrition Facts label. This will tell you how much sodium is in one serving of food. Look at both the serving size and the sodium amount. The serving size is located at the top of the label, usually right under the \"Nutrition Facts\" title. The amount of sodium is given in the list under the title. It is given in milligrams (mg). ¨ Check the serving size carefully. A single serving is often very small, and you may eat more than one serving. If this is the case, you will eat more sodium than listed on the label. For example, if the serving size for a canned soup is 1 cup and the sodium amount is 470 mg, if you have 2 cups you will eat 940 mg of sodium.   · The nutrition facts for fresh fruits and vegetables are not listed on the food. They may be listed somewhere in the store. These foods usually have no sodium or low sodium. · The Nutrition Facts label also gives you the Percent Daily Value for sodium. This is how much of the recommended amount of sodium a serving contains. The daily value for sodium is less than 2,300 mg. So if the Percent Daily Value says 50%, this means one serving is giving you half of this, or 1,150 mg. Buy low-sodium foods  · Look for foods that are made with less sodium. Watch for the following words on the label. ¨ \"Unsalted\" means there is no sodium added to the food. But there may be sodium already in the food naturally. ¨ \"Sodium-free\" means a serving has less than 5 mg of sodium. ¨ \"Very low sodium\" means a serving has 35 mg or less of sodium. ¨ \"Low-sodium\" means a serving has 140 mg or less of sodium. · \"Reduced-sodium\" means that there is 25% less sodium than what the food normally has. This is still usually too much sodium. Try not to buy foods with this on the label. · Buy fresh vegetables, or frozen vegetables without added sauces. Buy low-sodium versions of canned vegetables, soups, and other canned goods. Where can you learn more? Go to http://mateo-claudia.info/. Enter 26 [id number] in the search box to learn more about \"How to Read a Food Label to Limit Sodium: Care Instructions. \"  Current as of: May 12, 2017  Content Version: 11.4  © 9353-3927 [employer]. Care instructions adapted under license by [employer] (which disclaims liability or warranty for this information). If you have questions about a medical condition or this instruction, always ask your healthcare professional. [place] [address] any warranty or liability for your use of this information.

## 2018-03-12 NOTE — PROGRESS NOTES
[facility]    CC: [facility]    HPI:     Elevated BP:  - Previously on Norvasc, but feet swelled up  - HTN resolved with weight loss  - Diet is unrestricted  - Not following any exercise regimen  - Would like to try to get BP under control with lifestyle changes first, before starting any medication    Obesity:  - Previously tried [facility] in 2017. Lost 30 pounds.   - Has regained her lost weight  - Would like to participate in Dr. Israel Mayen weight loss program  - Had a sleep study that was negative     Vitamin D deficiency  - Only taking a multivitamin  - Reports that vitamin D level has not been check in some time    HLD:  - On no medication  - Being managed by diet  - Cholesterol has not been checked in over a year    GERD:  - Using famotidine  - Denies any issues or side effects from the medication  - Reports reflux controlled    Prediabetes:  - Has not been checked recently  - On metformin for PCOS    ROS: Positive items marked in RED  CON: fever, chills, fatigue  HEENT: HA, ear pain, eye pain, sore throat  Cardiovascular: palpitations, CP  Resp: cough, SOB (Occasionally with activity)  Lymph/Heme: swollen/enlarged lymph nodes, tender/painful lymph nodes  GI: nausea, vomiting, diarrhea  SKIN: rash, itching  : dysuria, hematuria  MS: arthralgias, myalgias  Neuro: numbness, weakness  Psych: depression, anxiety  Endo: polyuria, polydipsia    Past Medical History:   Diagnosis Date    Anxiety     Asthma     HLD (hyperlipidemia)     HSV-2 infection     PCOS (polycystic ovarian syndrome)     Vitamin D deficiency        Past Surgical History:   Procedure Laterality Date    HX BREAST LUMPECTOMY Left 2013    HX KNEE ARTHROSCOPY Right 2010    HX WISDOM TEETH EXTRACTION  2003       Family History   Problem Relation Age of Onset    Asthma Mother     GERD Mother     Diabetes Father     Sleep Apnea Father     Hypertension Father     Elevated Lipids Father     GERD Brother     Cancer Paternal Grandmother      throat 2/2 smoking       Social History     Social History    Marital status: LEGALLY      Spouse name: N/A    Number of children: N/A    Years of education: N/A     Occupational History    RN      Social History Main Topics    Smoking status: Never Smoker    Smokeless tobacco: Never Used    Alcohol use Yes      Comment: Socially    Drug use: No    Sexual activity: No     Other Topics Concern    Not on file     Social History Narrative    No narrative on file       Allergies   Allergen Reactions    Latex Atopic Dermatitis    Nubain [Nalbuphine] Hives    Sulfa (Sulfonamide Antibiotics) Hives    Shellfish Derived Hives         Current Outpatient Prescriptions:     cetirizine (ZYRTEC) 10 mg tablet, Take  by mouth., Disp: , Rfl:     montelukast (SINGULAIR) 10 mg tablet, Take 10 mg by mouth daily. , Disp: , Rfl:     fluticasone-vilanterol (BREO ELLIPTA) 200-25 mcg/dose inhaler, Take 1 Puff by inhalation daily. , Disp: , Rfl:     albuterol (PROVENTIL HFA, VENTOLIN HFA, PROAIR HFA) 90 mcg/actuation inhaler, Take  by inhalation. , Disp: , Rfl:     metFORMIN (GLUMETZA ER) 500 mg TG24 24 hour tablet, Take  by mouth daily. , Disp: , Rfl:     valACYclovir (VALTREX) 1 gram tablet, Take  by mouth daily. , Disp: , Rfl:     famotidine (PEPCID) 20 mg tablet, Take 20 mg by mouth as needed. , Disp: , Rfl:     Physical Exam:      BP (!) 141/97  Pulse 92  Temp 98.1 °F (36.7 °C) (Oral)   Resp 17  Ht 5' 8\" (1.727 m)  Wt 266 lb (120.7 kg)  SpO2 97%  BMI 40.45 kg/m2    General:  Obese habitus, NAD  Eyes: sclera clear bilaterally, no discharge noted, eyelids normal in appearance  HENT: NCAT, oropharynx clear, MMM  Neck: supple, no lymphadenopathy  Lungs: CTAB, Normal respiratory effort and rate  CV: RRR, no MRGs  ABD: soft, non-tender, non-distended, normal bowel sounds  Ext: no peripheral edema or digital cyanosis noted  Skin: normal temperature, turgor, color, and texture  Psych: alert and oriented to person, place and time, normal affect  Neuro: Speech normal, Moving all extremities, gait normal      Assessment/Plan     Morbid Obesity:  - Counseled on weight loss  - Will have her make appointment with weight loss program  - Will check TSH, HGBA1c, and lipid panel  - Handout given on starting a weight loss plan, low carb diet, and cutting calories  - Follow up in one month      Elevated BP:  - Counseled on lifestyle changes  - Will check TSH, CMP and CBC  - Handout given on low sodium/DASH diet and reading food labels for sodium content  - If BP elevated at follow up, will add diagnosis of HTN   - Follow up in one month      Prediabetes:  - Will check HGBA1c  - Advised to take metformin BID, as current dose is too low for PCOS management  - Follow up in one month      GERD:  - Will continue current famotidine regimen      Vitamin D deficiency:  - Will check vitamin D level      HLD:  - Will check fasting lipid panel        Kurt Ambrose MD  3/12/2018, 3:41 PM

## 2018-03-14 ENCOUNTER — HOSPITAL ENCOUNTER (OUTPATIENT)
Dept: LAB | Age: 33
Discharge: HOME | End: 2018-03-14
Payer: COMMERCIAL

## 2018-03-14 LAB
25(OH)D3 SERPL-MCNC: 18.2 NG/ML (ref 30–100)
ALBUMIN SERPL-MCNC: 3.7 G/DL (ref 3.4–5)
ALBUMIN/GLOB SERPL: 0.9 {RATIO} (ref 0.8–1.7)
ALP SERPL-CCNC: 86 U/L (ref 45–117)
ALT SERPL-CCNC: 18 U/L (ref 13–56)
ANION GAP SERPL CALC-SCNC: 8 MMOL/L (ref 3–18)
AST SERPL-CCNC: 9 U/L (ref 15–37)
BASOPHILS # BLD: 0 K/UL (ref 0–0.06)
BASOPHILS NFR BLD: 0 % (ref 0–2)
BILIRUB SERPL-MCNC: 0.3 MG/DL (ref 0.2–1)
BUN SERPL-MCNC: 12 MG/DL (ref 7–18)
BUN/CREAT SERPL: 20 (ref 12–20)
CALCIUM SERPL-MCNC: 8.9 MG/DL (ref 8.5–10.1)
CHLORIDE SERPL-SCNC: 102 MMOL/L (ref 100–108)
CHOLEST SERPL-MCNC: 206 MG/DL
CO2 SERPL-SCNC: 27 MMOL/L (ref 21–32)
CREAT SERPL-MCNC: 0.59 MG/DL (ref 0.6–1.3)
DIFFERENTIAL METHOD BLD: NORMAL
EOSINOPHIL # BLD: 0.1 K/UL (ref 0–0.4)
EOSINOPHIL NFR BLD: 1 % (ref 0–5)
ERYTHROCYTE [DISTWIDTH] IN BLOOD BY AUTOMATED COUNT: 13.6 % (ref 11.6–14.5)
EST. AVERAGE GLUCOSE BLD GHB EST-MCNC: 126 MG/DL
GLOBULIN SER CALC-MCNC: 4.1 G/DL (ref 2–4)
GLUCOSE SERPL-MCNC: 83 MG/DL (ref 74–99)
HBA1C MFR BLD: 6 % (ref 4.2–5.6)
HCT VFR BLD AUTO: 39.6 % (ref 35–45)
HDLC SERPL-MCNC: 67 MG/DL (ref 40–60)
HDLC SERPL: 3.1 {RATIO} (ref 0–5)
HGB BLD-MCNC: 13.1 G/DL (ref 12–16)
LDLC SERPL CALC-MCNC: 124.6 MG/DL (ref 0–100)
LIPID PROFILE,FLP: ABNORMAL
LYMPHOCYTES # BLD: 2.7 K/UL (ref 0.9–3.6)
LYMPHOCYTES NFR BLD: 30 % (ref 21–52)
MCH RBC QN AUTO: 29.4 PG (ref 24–34)
MCHC RBC AUTO-ENTMCNC: 33.1 G/DL (ref 31–37)
MCV RBC AUTO: 89 FL (ref 74–97)
MONOCYTES # BLD: 0.5 K/UL (ref 0.05–1.2)
MONOCYTES NFR BLD: 6 % (ref 3–10)
NEUTS SEG # BLD: 5.6 K/UL (ref 1.8–8)
NEUTS SEG NFR BLD: 63 % (ref 40–73)
PLATELET # BLD AUTO: 292 K/UL (ref 135–420)
PMV BLD AUTO: 10.5 FL (ref 9.2–11.8)
POTASSIUM SERPL-SCNC: 4.2 MMOL/L (ref 3.5–5.5)
PROT SERPL-MCNC: 7.8 G/DL (ref 6.4–8.2)
RBC # BLD AUTO: 4.45 M/UL (ref 4.2–5.3)
SODIUM SERPL-SCNC: 137 MMOL/L (ref 136–145)
TRIGL SERPL-MCNC: 72 MG/DL (ref ?–150)
TSH SERPL DL<=0.05 MIU/L-ACNC: 1.53 UIU/ML (ref 0.36–3.74)
VLDLC SERPL CALC-MCNC: 14.4 MG/DL
WBC # BLD AUTO: 8.9 K/UL (ref 4.6–13.2)

## 2018-03-14 PROCEDURE — 80053 COMPREHEN METABOLIC PANEL: CPT | Performed by: FAMILY MEDICINE

## 2018-03-14 PROCEDURE — 83036 HEMOGLOBIN GLYCOSYLATED A1C: CPT | Performed by: FAMILY MEDICINE

## 2018-03-14 PROCEDURE — 84443 ASSAY THYROID STIM HORMONE: CPT | Performed by: FAMILY MEDICINE

## 2018-03-14 PROCEDURE — 36415 COLL VENOUS BLD VENIPUNCTURE: CPT | Performed by: FAMILY MEDICINE

## 2018-03-14 PROCEDURE — 85025 COMPLETE CBC W/AUTO DIFF WBC: CPT | Performed by: FAMILY MEDICINE

## 2018-03-14 PROCEDURE — 80061 LIPID PANEL: CPT | Performed by: FAMILY MEDICINE

## 2018-03-14 PROCEDURE — 82306 VITAMIN D 25 HYDROXY: CPT | Performed by: FAMILY MEDICINE

## 2018-03-15 DIAGNOSIS — E55.9 VITAMIN D DEFICIENCY: Primary | ICD-10-CM

## 2018-03-15 RX ORDER — MELATONIN
1000 DAILY
Qty: 90 TAB | Refills: 1 | Status: SHIPPED | OUTPATIENT
Start: 2018-03-15 | End: 2018-07-17 | Stop reason: SDUPTHER

## 2018-04-10 ENCOUNTER — OFFICE VISIT (OUTPATIENT)
Dept: FAMILY MEDICINE CLINIC | Age: 33
End: 2018-04-10

## 2018-04-10 VITALS
HEART RATE: 76 BPM | DIASTOLIC BLOOD PRESSURE: 78 MMHG | WEIGHT: 268 LBS | BODY MASS INDEX: 40.62 KG/M2 | SYSTOLIC BLOOD PRESSURE: 107 MMHG | TEMPERATURE: 98.4 F | RESPIRATION RATE: 18 BRPM | HEIGHT: 68 IN

## 2018-04-10 DIAGNOSIS — E78.5 HYPERLIPIDEMIA, UNSPECIFIED HYPERLIPIDEMIA TYPE: Primary | ICD-10-CM

## 2018-04-10 DIAGNOSIS — R03.0 ELEVATED BP WITHOUT DIAGNOSIS OF HYPERTENSION: ICD-10-CM

## 2018-04-10 DIAGNOSIS — E55.9 VITAMIN D DEFICIENCY: ICD-10-CM

## 2018-04-10 DIAGNOSIS — F32.A DEPRESSION, UNSPECIFIED DEPRESSION TYPE: ICD-10-CM

## 2018-04-10 DIAGNOSIS — E66.01 CLASS 3 SEVERE OBESITY DUE TO EXCESS CALORIES WITHOUT SERIOUS COMORBIDITY WITH BODY MASS INDEX (BMI) OF 40.0 TO 44.9 IN ADULT (HCC): ICD-10-CM

## 2018-04-10 DIAGNOSIS — R73.03 PREDIABETES: ICD-10-CM

## 2018-04-10 RX ORDER — BUPROPION HYDROCHLORIDE 150 MG/1
150 TABLET, EXTENDED RELEASE ORAL 2 TIMES DAILY
Qty: 60 TAB | Refills: 1 | Status: SHIPPED | OUTPATIENT
Start: 2018-04-10 | End: 2018-07-12 | Stop reason: ALTCHOICE

## 2018-04-10 RX ORDER — ALBUTEROL SULFATE 90 UG/1
2 AEROSOL, METERED RESPIRATORY (INHALATION)
Qty: 1 INHALER | Refills: 3 | Status: SHIPPED | OUTPATIENT
Start: 2018-04-10 | End: 2018-04-19 | Stop reason: ALTCHOICE

## 2018-04-10 NOTE — PROGRESS NOTES
[facility]    CC: F/[facility] for chronic disease management    HPI:     Morbid Obesity:  - Patient got the requested blood work  - Has orientation for the weight loss program on the 19th of this month  - Not following any exercise regimen  - Has difficulty motivating her self  - Has been feeling depressed (Has been on medication for this in the past)  - Appetite plays a big role in her weight issues   - Open to starting medication currently to improve mood and appetite      Elevated BP:  - Patient got the requested blood work  - Has reduced her salt intake  - Not following any exercise regimen  - Has been checking BP at home.  No log brought in for review  - Reports SBP in 110-120s        Prediabetes:  - Got the requested blood work  - Has been trying to take metformin BID, as recommended for PCOS  - Reports significant issues with gas in the morning, due to metformin  - Does not taking AM dose on days that she works, due to the gas issues        Vitamin D deficiency:  - Got requested blood work   - Taking Vitamin D as prescribed  - Denies any issue/side effects with the supplement        HLD:  - Got requested fasting panel  - Currently on no medication  - Not following any exercise regimen      ROS: Positive items marked in RED  CON: fever, chills  Cardiovascular: palpitations, CP  Resp: wheezing, sputum, hemoptysis, SOB  GI: nausea, vomiting, diarrhea  : dysuria, hematuria      Past Medical History:   Diagnosis Date    Anxiety     Asthma     Environmental allergies     GERD (gastroesophageal reflux disease)     HLD (hyperlipidemia)     HSV-2 infection     PCOS (polycystic ovarian syndrome)     Prediabetes     Vitamin D deficiency        Past Surgical History:   Procedure Laterality Date    HX BREAST LUMPECTOMY Left 2013    HX KNEE ARTHROSCOPY Right 2010    HX WISDOM TEETH EXTRACTION  2003       Family History   Problem Relation Age of Onset    Asthma Mother    Sherry Lucas GERD Mother     Diabetes Father     Sleep Apnea Father     Hypertension Father     Elevated Lipids Father     GERD Brother     Cancer Paternal Grandmother      throat 2/2 smoking       Social History     Social History    Marital status: LEGALLY      Spouse name: N/A    Number of children: N/A    Years of education: N/A     Occupational History    RN      Social History Main Topics    Smoking status: Never Smoker    Smokeless tobacco: Never Used    Alcohol use Yes      Comment: Socially    Drug use: No    Sexual activity: No     Other Topics Concern    None     Social History Narrative       Allergies   Allergen Reactions    Latex Atopic Dermatitis    Nubain [Nalbuphine] Hives    Sulfa (Sulfonamide Antibiotics) Hives    Shellfish Derived Hives         Current Outpatient Prescriptions:     cholecalciferol (VITAMIN D3) 1,000 unit tablet, Take 1 Tab by mouth daily. , Disp: 90 Tab, Rfl: 1    montelukast (SINGULAIR) 10 mg tablet, Take 10 mg by mouth daily. , Disp: , Rfl:     fluticasone-vilanterol (BREO ELLIPTA) 200-25 mcg/dose inhaler, Take 1 Puff by inhalation daily. , Disp: , Rfl:     metFORMIN (GLUMETZA ER) 500 mg TG24 24 hour tablet, Take  by mouth daily. , Disp: , Rfl:     valACYclovir (VALTREX) 1 gram tablet, Take  by mouth daily. , Disp: , Rfl:     famotidine (PEPCID) 20 mg tablet, Take 20 mg by mouth as needed. , Disp: , Rfl:     cetirizine (ZYRTEC) 10 mg tablet, Take  by mouth., Disp: , Rfl:     albuterol (PROVENTIL HFA, VENTOLIN HFA, PROAIR HFA) 90 mcg/actuation inhaler, Take  by inhalation. , Disp: , Rfl:     Physical Exam:      /78  Pulse 76  Temp 98.4 °F (36.9 °C) (Oral)   Resp 18  Ht 5' 8\" (1.727 m)  Wt 268 lb (121.6 kg)  LMP 03/14/2018 (Exact Date)  BMI 40.75 kg/m2    General:  Obese habitus, NAD  Eyes: sclera clear bilaterally, no discharge noted, eyelids normal in appearance  HENT: NCAT  Lungs: CTAB, Normal respiratory effort and rate  CV: RRR, no MRGs  ABD: soft, non-tender, non-distended, normal bowel sounds  Skin: normal temperature, turgor, color, and texture  Psych: alert and oriented to person, place and time, normal affect  Neuro: Speech normal, Moving all extremities, gait normal    Results for Jesse Orellana (MRN [id number]) as of 4/10/2018 08:14   Ref. Range 3/14/2018 11:08   WBC Latest Ref Range: 4.6 - 13.2 K/uL 8.9   RBC Latest Ref Range: 4.20 - 5.30 M/uL 4.45   HGB Latest Ref Range: 12.0 - 16.0 g/dL 13.1   HCT Latest Ref Range: 35.0 - 45.0 % 39.6   MCV Latest Ref Range: 74.0 - 97.0 FL 89.0   MCH Latest Ref Range: 24.0 - 34.0 PG 29.4   MCHC Latest Ref Range: 31.0 - 37.0 g/dL 33.1   RDW Latest Ref Range: 11.6 - 14.5 % 13.6   PLATELET Latest Ref Range: 135 - 420 K/uL 292   MPV Latest Ref Range: 9.2 - 11.8 FL 10.5   NEUTROPHILS Latest Ref Range: 40 - 73 % 63   LYMPHOCYTES Latest Ref Range: 21 - 52 % 30   MONOCYTES Latest Ref Range: 3 - 10 % 6   EOSINOPHILS Latest Ref Range: 0 - 5 % 1   BASOPHILS Latest Ref Range: 0 - 2 % 0   DF Latest Units:   AUTOMATED   ABS. NEUTROPHILS Latest Ref Range: 1.8 - 8.0 K/UL 5.6   ABS. LYMPHOCYTES Latest Ref Range: 0.9 - 3.6 K/UL 2.7   ABS. MONOCYTES Latest Ref Range: 0.05 - 1.2 K/UL 0.5   ABS. EOSINOPHILS Latest Ref Range: 0.0 - 0.4 K/UL 0.1   ABS.  BASOPHILS Latest Ref Range: 0.0 - 0.06 K/UL 0.0   Sodium Latest Ref Range: 136 - 145 mmol/L 137   Potassium Latest Ref Range: 3.5 - 5.5 mmol/L 4.2   Chloride Latest Ref Range: 100 - 108 mmol/L 102   CO2 Latest Ref Range: 21 - 32 mmol/L 27   Anion gap Latest Ref Range: 3.0 - 18 mmol/L 8   Glucose Latest Ref Range: 74 - 99 mg/dL 83   BUN Latest Ref Range: 7.0 - 18 MG/DL 12   Creatinine Latest Ref Range: 0.6 - 1.3 MG/DL 0.59 (L)   BUN/Creatinine ratio Latest Ref Range: 12 - 20   20   Calcium Latest Ref Range: 8.5 - 10.1 MG/DL 8.9   GFR est non-AA Latest Ref Range: >60 ml/min/1.73m2 >60   GFR est AA Latest Ref Range: >60 ml/min/1.73m2 >60   Bilirubin, total Latest Ref Range: 0.2 - 1.0 MG/DL 0.3 Protein, total Latest Ref Range: 6.4 - 8.2 g/dL 7.8   Albumin Latest Ref Range: 3.4 - 5.0 g/dL 3.7   Globulin Latest Ref Range: 2.0 - 4.0 g/dL 4.1 (H)   A-G Ratio Latest Ref Range: 0.8 - 1.7   0.9   ALT (SGPT) Latest Ref Range: 13 - 56 U/L 18   AST Latest Ref Range: 15 - 37 U/L 9 (L)   Alk. phosphatase Latest Ref Range: 45 - 117 U/L 86   Triglyceride Latest Ref Range: <150 MG/DL 72   Cholesterol, total Latest Ref Range: <200 MG/ (H)   HDL Cholesterol Latest Ref Range: 40 - 60 MG/DL 67 (H)   CHOL/HDL Ratio Latest Ref Range: 0 - 5.0   3.1   LDL, calculated Latest Ref Range: 0 - 100 MG/.6 (H)   VLDL, calculated Latest Units: MG/DL 14.4   TSH Latest Ref Range: 0.36 - 3.74 uIU/mL 1.53        Ref. Range 3/14/2018 11:07   Hemoglobin A1c, (calculated) Latest Ref Range: 4.2 - 5.6 % 6.0 (H)   Est. average glucose Latest Units: mg/dL 126       Assessment/Plan     Morbid Obesity/Depressed Mood:  - Has gained 2 pounds since last visit. Possibly 2/2 her depressed mood. - Will start on Contrave for appetite suppression and depressed mood  - Follow up in one month        Elevated BP, Resolved:  - Advised to continue following the recommended lifestyle changes  - Will continue to monitor BP  - Follow up as needed        Prediabetes:  - Advised to continue working on lifestyle changes  - Will continue current metformin regimen for her PCOS        Vitamin D deficiency:  - Will continue current Vitamin D repletion regimen for 2 months  - Plan to recheck vitamin D level after 2 months        HLD:  - Counseled on lifestyle changes.   - Patient would like to manage her cholesterol with lifestyle changes   - Handout given on HLD care        Parker Roblero MD  4/10/2018, 8:14 AM

## 2018-04-10 NOTE — MR AVS SNAPSHOT
Cinthia Urban [address] [address] 
[phone] Patient: Rosemary Walton MRN: [id number] EMF:[id number] Visit Information Date & Time Provider Department Dept. Phone Encounter #  
 4/10/2018  8:00 AM Franco Paulino [id number] Follow-up Instructions Return in about 1 month (around 5/10/2018) for Mood and weight. Your Appointments 4/19/2018 10:00 AM  
Nurse Visit with [address] [place] [address] [address] [facility] Metabolic Program ([facility]) Appt Note: orientation [facility] Metabolic Program ([address] [place] [address]) [phone] 4/19/2018  1:00 PM  
NEW PATIENT ANNUAL with Payton Vasquez MD  
[address] [facility] ([address]) Appt Note: NEW PATIENT, ANNUAL,  
 [place] [address]  
[phone]  
  
   
 [facility] Up[facility] Health Maintenance Date Due DTaP/Tdap/Td series (1 - Tdap) 8/31/2004 PAP AKA CERVICAL CYTOLOGY 8/31/2004 Allergies as of 4/10/2018  Review Complete On: 4/10/2018 By: Ashly Caraballo LPN Severity Noted Reaction Type Reactions Latex  03/12/2018    Atopic Dermatitis Nubain [Nalbuphine] Medium 03/12/2018    Hives Sulfa (Sulfonamide Antibiotics) Medium 03/12/2018    Hives Shellfish Derived  03/12/2018    Hives Current Immunizations  Never Reviewed Name Date Influenza Nasal Vaccine (Quad) 10/12/2017 Not reviewed this visit You Were Diagnosed With   
  
 Codes Comments Hyperlipidemia, unspecified hyperlipidemia type    -  Primary ICD-10-CM: E78.5 ICD-9-CM: 272.4 Elevated BP without diagnosis of hypertension     ICD-10-CM: R03.0 ICD-9-CM: 796.2 Prediabetes     ICD-10-CM: R73.03 
ICD-9-CM: 790.29  Class 3 severe obesity due to excess calories without serious comorbidity with body mass index (BMI) of 40.0 to 44.9 in adult [facility])     ICD-10-CM: E66.01, Z68.41 
ICD-9-CM: 278.01, V85.41 Depression, unspecified depression type     ICD-10-CM: F32.9 ICD-9-CM: 111 Vitamin D deficiency     ICD-10-CM: E55.9 ICD-9-CM: 268.9 Vitals BP Pulse Temp Resp Height(growth percentile) Weight(growth percentile) 107/78 76 98.4 °F (36.9 °C) (Oral) 18 5' 8\" (1.727 m) 268 lb (121.6 kg) LMP BMI Smoking Status 03/14/2018 (Exact Date) 40.75 kg/m2 Never Smoker Vitals History BMI and BSA Data Body Mass Index Body Surface Area 40.75 kg/m 2 2.42 m 2 Preferred Pharmacy Pharmacy Name Phone [facility]/PHARM[facility] [address] [phone] Your Updated Medication List  
  
   
This list is accurate as of 4/10/18  8:48 AM.  Always use your most recent med list.  
  
  
  
  
 albuterol 90 mcg/actuation inhaler Commonly known as:  PROVENTIL HFA, VENTOLIN HFA, PROAIR HFA Take 2 Puffs by inhalation every four (4) hours as needed for Wheezing or Shortness of Breath. BREO ELLIPTA 200-25 mcg/dose inhaler Generic drug:  fluticasone-vilanterol Take 1 Puff by inhalation daily. cholecalciferol 1,000 unit tablet Commonly known as:  VITAMIN D3 Take 1 Tab by mouth daily. metFORMIN 500 mg Tg24 24 hour tablet Commonly known asDespina Goltz ER Take  by mouth daily. naltrexone-buPROPion 8-90 mg Tber ER tablet Commonly known as:  Darrelyn Alias Week 1 1 tab PO QAM, Week 2 1QAM 1QHS, Week 3 2QAM 1 QHS, Week 4 & beyond 2QAM 2QHS PEPCID 20 mg tablet Generic drug:  famotidine Take 20 mg by mouth as needed. SINGULAIR 10 mg tablet Generic drug:  montelukast  
Take 10 mg by mouth daily. VALTREX 1 gram tablet Generic drug:  valACYclovir Take  by mouth daily. ZyrTEC 10 mg tablet Generic drug:  cetirizine Take  by mouth. Prescriptions Sent to Pharmacy Refills  
 albuterol (PROVENTIL HFA, VENTOLIN HFA, PROAIR HFA) 90 mcg/actuation inhaler 3 Sig: Take 2 Puffs by inhalation every four (4) hours as needed for Wheezing or Shortness of Breath. Class: Normal  
 Pharmacy: [address] [place] [address] Ph #: [phone] Route: Inhalation  
 naltrexone-buPROPion (CONTRAVE) 8-90 mg TbER ER tablet 3 Sig: Week 1 1 tab PO QAM, Week 2 1QAM 1QHS, Week 3 2QAM 1 QHS, Week 4 & beyond 2QAM 2QHS Class: Normal  
 Pharmacy: [address] [place] [address] Ph #: [phone] Follow-up Instructions Return in about 1 month (around 5/10/2018) for Mood and weight. Patient Instructions Hyperlipidemia: After Your Visit Your Care Instructions Hyperlipidemia is too much fat in your blood. The body has several kinds of fat, including cholesterol and triglycerides. Your body needs fat for many things, such as making new cells. But too much fat in your blood increases your chances of having a heart attack or stroke. You may be able to lower your cholesterol and triglycerides with a heart-healthy diet, exercise, and if needed, medicine. Your doctor may want you to try lifestyle changes first to see whether they lower the fat in your blood. You may need to take medicine if lifestyle changes do not lower the fat in your blood enough. Follow-up care is a key part of your treatment and safety. Be sure to make and go to all appointments, and call your doctor if you are having problems. Its also a good idea to know your test results and keep a list of the medicines you take. How can you care for yourself at home? Take your medicines · Take your medicines exactly as prescribed. Call your doctor if you think you are having a problem with your medicine. · If you take medicine to lower your cholesterol, go to follow-up visits. You will need to have blood tests. · Do not take large doses of niacin, which is a B vitamin, while taking medicine called statins. It may increase the chance of muscle pain and liver problems. · Talk to your doctor about avoiding grapefruit juice if you are taking statins. Grapefruit juice can raise the level of this medicine in your blood. This could increase side effects. Eat more fruits, vegetables, and fiber · Fruits and vegetables have lots of nutrients that help protect against heart disease, and they have littleif anyfat. Try to eat at least five servings a day. Dark green, deep orange, or yellow fruits and vegetables are healthy choices. · Keep carrots, celery, and other veggies handy for snacks. Buy fruit that is in season and store it where you can see it so that you will be tempted to eat it. Cook dishes that have a lot of veggies in them, such as stir-fries and soups. · Foods high in fiber may reduce your cholesterol and provide important vitamins and minerals. High-fiber foods include whole-grain cereals and breads, oatmeal, beans, brown rice, citrus fruits, and apples. · Buy whole-grain breads and cereals instead of white bread and pastries. Limit saturated fat · Read food labels and try to avoid saturated fat and trans fat. They increase your risk of heart disease. · Use olive or canola oil when you cook. Try cholesterol-lowering spreads, such as Benecol or Take Control. · Bake, broil, grill, or steam foods instead of frying them. · Limit the amount of high-fat meats you eat, including hot dogs and sausages. Cut out all visible fat when you prepare meat. · Eat fish, skinless poultry, and soy products such as tofu instead of high-fat meats. Soybeans may be especially good for your heart. Eat at least two servings of fish a week. Certain fish, such as salmon, contain omega-3 fatty acids, which may help reduce your risk of heart attack. · Choose low-fat or fat-free milk and dairy products. Get exercise, limit alcohol, and quit smoking · Get more exercise. Work with your doctor to set up an exercise program. Even if you can do only a small amount, exercise will help you get stronger, have more energy, and manage your weight and your stress. Walking is an easy way to get exercise. Gradually increase the amount you walk every day. Aim for at least 30 minutes on most days of the week. You also may want to swim, bike, or do other activities. · Limit alcohol to no more than 2 drinks a day for men and 1 drink a day for women. · Do not smoke. If you need help quitting, talk to your doctor about stop-smoking programs and medicines. These can increase your chances of quitting for good. When should you call for help? Call 911 anytime you think you may need emergency care. For example, call if: 
· You have symptoms of a heart attack. These may include: ¨ Chest pain or pressure, or a strange feeling in the chest. 
¨ Sweating. ¨ Shortness of breath. ¨ Nausea or vomiting. ¨ Pain, pressure, or a strange feeling in the back, neck, jaw, or upper belly or in one or both shoulders or arms. ¨ Lightheadedness or sudden weakness. ¨ A fast or irregular heartbeat. After you call 911, the  may tell you to chew 1 adult-strength or 2 to 4 low-dose aspirin. Wait for an ambulance. Do not try to drive yourself. · You have signs of a stroke. These may include: 
¨ Sudden numbness, paralysis, or weakness in your face, arm, or leg, especially on only one side of your body. ¨ New problems with walking or balance. ¨ Sudden vision changes. ¨ Drooling or slurred speech. ¨ New problems speaking or understanding simple statements, or feeling confused. ¨ A sudden, severe headache that is different from past headaches. · You passed out (lost consciousness). Call your doctor now or seek immediate medical care if: 
· You have muscle pain or weakness. Watch closely for changes in your health, and be sure to contact your doctor if: 
· You are very tired. · You have an upset stomach, gas, constipation, or belly pain or cramps. Where can you learn more? Go to goTaja.com.be Enter C406 in the search box to learn more about \"Hyperlipidemia: After Your Visit. \"  
© 4653-7174 Healthwise, Incorporated. Care instructions adapted under license by [employer] (which disclaims liability or warranty for this information). This care instruction is for use with your licensed healthcare professional. If you have questions about a medical condition or this instruction, always ask your healthcare professional. [address] any warranty or liability for your use of this information. Content Version: 3.1.845301; Last Revised: October 13, 2011 Hyperlipidemia: After Your Visit Your Care Instructions Hyperlipidemia is too much fat in your blood. The body has several kinds of fat, including cholesterol and triglycerides. Your body needs fat for many things, such as making new cells. But too much fat in your blood increases your chances of having a heart attack or stroke. You may be able to lower your cholesterol and triglycerides with a heart-healthy diet, exercise, and if needed, medicine. Your doctor may want you to try lifestyle changes first to see whether they lower the fat in your blood. You may need to take medicine if lifestyle changes do not lower the fat in your blood enough. Follow-up care is a key part of your treatment and safety. Be sure to make and go to all appointments, and call your doctor if you are having problems. Its also a good idea to know your test results and keep a list of the medicines you take. How can you care for yourself at home? Take your medicines · Take your medicines exactly as prescribed. Call your doctor if you think you are having a problem with your medicine. · If you take medicine to lower your cholesterol, go to follow-up visits. You will need to have blood tests. · Do not take large doses of niacin, which is a B vitamin, while taking medicine called statins. It may increase the chance of muscle pain and liver problems. · Talk to your doctor about avoiding grapefruit juice if you are taking statins. Grapefruit juice can raise the level of this medicine in your blood. This could increase side effects. Eat more fruits, vegetables, and fiber · Fruits and vegetables have lots of nutrients that help protect against heart disease, and they have littleif anyfat. Try to eat at least five servings a day. Dark green, deep orange, or yellow fruits and vegetables are healthy choices. · Keep carrots, celery, and other veggies handy for snacks. Buy fruit that is in season and store it where you can see it so that you will be tempted to eat it. Cook dishes that have a lot of veggies in them, such as stir-fries and soups. · Foods high in fiber may reduce your cholesterol and provide important vitamins and minerals. High-fiber foods include whole-grain cereals and breads, oatmeal, beans, brown rice, citrus fruits, and apples. · Buy whole-grain breads and cereals instead of white bread and pastries. Limit saturated fat · Read food labels and try to avoid saturated fat and trans fat. They increase your risk of heart disease. · Use olive or canola oil when you cook. Try cholesterol-lowering spreads, such as Benecol or Take Control. · Bake, broil, grill, or steam foods instead of frying them. · Limit the amount of high-fat meats you eat, including hot dogs and sausages. Cut out all visible fat when you prepare meat. · Eat fish, skinless poultry, and soy products such as tofu instead of high-fat meats. Soybeans may be especially good for your heart. Eat at least two servings of fish a week.  Certain fish, such as salmon, contain omega-3 fatty acids, which may help reduce your risk of heart attack. · Choose low-fat or fat-free milk and dairy products. Get exercise, limit alcohol, and quit smoking · Get more exercise. Work with your doctor to set up an exercise program. Even if you can do only a small amount, exercise will help you get stronger, have more energy, and manage your weight and your stress. Walking is an easy way to get exercise. Gradually increase the amount you walk every day. Aim for at least 30 minutes on most days of the week. You also may want to swim, bike, or do other activities. · Limit alcohol to no more than 2 drinks a day for men and 1 drink a day for women. · Do not smoke. If you need help quitting, talk to your doctor about stop-smoking programs and medicines. These can increase your chances of quitting for good. When should you call for help? Call 911 anytime you think you may need emergency care. For example, call if: 
· You have symptoms of a heart attack. These may include: ¨ Chest pain or pressure, or a strange feeling in the chest. 
¨ Sweating. ¨ Shortness of breath. ¨ Nausea or vomiting. ¨ Pain, pressure, or a strange feeling in the back, neck, jaw, or upper belly or in one or both shoulders or arms. ¨ Lightheadedness or sudden weakness. ¨ A fast or irregular heartbeat. After you call 911, the  may tell you to chew 1 adult-strength or 2 to 4 low-dose aspirin. Wait for an ambulance. Do not try to drive yourself. · You have signs of a stroke. These may include: 
¨ Sudden numbness, paralysis, or weakness in your face, arm, or leg, especially on only one side of your body. ¨ New problems with walking or balance. ¨ Sudden vision changes. ¨ Drooling or slurred speech. ¨ New problems speaking or understanding simple statements, or feeling confused. ¨ A sudden, severe headache that is different from past headaches. · You passed out (lost consciousness). Call your doctor now or seek immediate medical care if: 
· You have muscle pain or weakness. Watch closely for changes in your health, and be sure to contact your doctor if: 
· You are very tired. · You have an upset stomach, gas, constipation, or belly pain or cramps. Where can you learn more? Go to [employer].be Enter C406 in the search box to learn more about \"Hyperlipidemia: After Your Visit. \"  
© 5596-9607 Healthwise, Incorporated. Care instructions adapted under license by [employer] (which disclaims liability or warranty for this information). This care instruction is for use with your licensed healthcare professional. If you have questions about a medical condition or this instruction, always ask your healthcare professional. [address] any warranty or liability for your use of this information. Content Version: 0.4.187810; Last Revised: October 13, 2011 Introducing [facility]! Dear Roxanne Tse: Thank you for requesting a [employer] account. Our records indicate that you already have an active [employer] account. You can access your account anytime at [email or web address] Did you know that you can access your hospital and ER discharge instructions at any time in [employer]? You can also review all of your test results from your hospital stay or ER visit. Additional Information If you have questions, please visit the Frequently Asked Questions section of the [employer] website at [email or web address] Remember, [employer] is NOT to be used for urgent needs. For medical emergencies, dial 911. Now available from your iPhone and Android! Please provide this summary of care documentation to your next provider. Your primary care clinician is listed as Nasrin Matthews. If you have any questions after today's visit, please call [phone].

## 2018-04-10 NOTE — PROGRESS NOTES
1. Have you been to the ER, urgent care clinic since your last visit? Hospitalized since your last visit? No    2. Have you seen or consulted any other health care providers outside of the [address] [place] [address] since your last visit? Include any pap smears or colon screening.  No

## 2018-04-10 NOTE — PATIENT INSTRUCTIONS
Hyperlipidemia: After Your Visit  Your Care Instructions  Hyperlipidemia is too much fat in your blood. The body has several kinds of fat, including cholesterol and triglycerides. Your body needs fat for many things, such as making new cells. But too much fat in your blood increases your chances of having a heart attack or stroke. You may be able to lower your cholesterol and triglycerides with a heart-healthy diet, exercise, and if needed, medicine. Your doctor may want you to try lifestyle changes first to see whether they lower the fat in your blood. You may need to take medicine if lifestyle changes do not lower the fat in your blood enough. Follow-up care is a key part of your treatment and safety. Be sure to make and go to all appointments, and call your doctor if you are having problems. Its also a good idea to know your test results and keep a list of the medicines you take. How can you care for yourself at home? Take your medicines  · Take your medicines exactly as prescribed. Call your doctor if you think you are having a problem with your medicine. · If you take medicine to lower your cholesterol, go to follow-up visits. You will need to have blood tests. · Do not take large doses of niacin, which is a B vitamin, while taking medicine called statins. It may increase the chance of muscle pain and liver problems. · Talk to your doctor about avoiding grapefruit juice if you are taking statins. Grapefruit juice can raise the level of this medicine in your blood. This could increase side effects. Eat more fruits, vegetables, and fiber  · Fruits and vegetables have lots of nutrients that help protect against heart disease, and they have little--if any--fat. Try to eat at least five servings a day. Dark green, deep orange, or yellow fruits and vegetables are healthy choices. · Keep carrots, celery, and other veggies handy for snacks.  Buy fruit that is in season and store it where you can see it so that you will be tempted to eat it. Cook dishes that have a lot of veggies in them, such as stir-fries and soups. · Foods high in fiber may reduce your cholesterol and provide important vitamins and minerals. High-fiber foods include whole-grain cereals and breads, oatmeal, beans, brown rice, citrus fruits, and apples. · Buy whole-grain breads and cereals instead of white bread and pastries. Limit saturated fat  · Read food labels and try to avoid saturated fat and trans fat. They increase your risk of heart disease. · Use olive or canola oil when you cook. Try cholesterol-lowering spreads, such as Benecol or Take Control. · Bake, broil, grill, or steam foods instead of frying them. · Limit the amount of high-fat meats you eat, including hot dogs and sausages. Cut out all visible fat when you prepare meat. · Eat fish, skinless poultry, and soy products such as tofu instead of high-fat meats. Soybeans may be especially good for your heart. Eat at least two servings of fish a week. Certain fish, such as salmon, contain omega-3 fatty acids, which may help reduce your risk of heart attack. · Choose low-fat or fat-free milk and dairy products. Get exercise, limit alcohol, and quit smoking  · Get more exercise. Work with your doctor to set up an exercise program. Even if you can do only a small amount, exercise will help you get stronger, have more energy, and manage your weight and your stress. Walking is an easy way to get exercise. Gradually increase the amount you walk every day. Aim for at least 30 minutes on most days of the week. You also may want to swim, bike, or do other activities. · Limit alcohol to no more than 2 drinks a day for men and 1 drink a day for women. · Do not smoke. If you need help quitting, talk to your doctor about stop-smoking programs and medicines. These can increase your chances of quitting for good. When should you call for help?   Call 911 anytime you think you may need emergency care. For example, call if:  · You have symptoms of a heart attack. These may include:  ¨ Chest pain or pressure, or a strange feeling in the chest.  ¨ Sweating. ¨ Shortness of breath. ¨ Nausea or vomiting. ¨ Pain, pressure, or a strange feeling in the back, neck, jaw, or upper belly or in one or both shoulders or arms. ¨ Lightheadedness or sudden weakness. ¨ A fast or irregular heartbeat. After you call 911, the  may tell you to chew 1 adult-strength or 2 to 4 low-dose aspirin. Wait for an ambulance. Do not try to drive yourself. · You have signs of a stroke. These may include:  ¨ Sudden numbness, paralysis, or weakness in your face, arm, or leg, especially on only one side of your body. ¨ New problems with walking or balance. ¨ Sudden vision changes. ¨ Drooling or slurred speech. ¨ New problems speaking or understanding simple statements, or feeling confused. ¨ A sudden, severe headache that is different from past headaches. · You passed out (lost consciousness). Call your doctor now or seek immediate medical care if:  · You have muscle pain or weakness. Watch closely for changes in your health, and be sure to contact your doctor if:  · You are very tired. · You have an upset stomach, gas, constipation, or belly pain or cramps. Where can you learn more? Go to thinktank.net.be  Enter C406 in the search box to learn more about \"Hyperlipidemia: After Your Visit. \"   © 4492-9880 Healthwise, Incorporated. Care instructions adapted under license by [employer] (which disclaims liability or warranty for this information). This care instruction is for use with your licensed healthcare professional. If you have questions about a medical condition or this instruction, always ask your healthcare professional. [place] [address] any warranty or liability for your use of this information.   Content Version: 0.4.520866; Last Revised: October 13, 2011              Hyperlipidemia: After Your Visit  Your Care Instructions  Hyperlipidemia is too much fat in your blood. The body has several kinds of fat, including cholesterol and triglycerides. Your body needs fat for many things, such as making new cells. But too much fat in your blood increases your chances of having a heart attack or stroke. You may be able to lower your cholesterol and triglycerides with a heart-healthy diet, exercise, and if needed, medicine. Your doctor may want you to try lifestyle changes first to see whether they lower the fat in your blood. You may need to take medicine if lifestyle changes do not lower the fat in your blood enough. Follow-up care is a key part of your treatment and safety. Be sure to make and go to all appointments, and call your doctor if you are having problems. Its also a good idea to know your test results and keep a list of the medicines you take. How can you care for yourself at home? Take your medicines  · Take your medicines exactly as prescribed. Call your doctor if you think you are having a problem with your medicine. · If you take medicine to lower your cholesterol, go to follow-up visits. You will need to have blood tests. · Do not take large doses of niacin, which is a B vitamin, while taking medicine called statins. It may increase the chance of muscle pain and liver problems. · Talk to your doctor about avoiding grapefruit juice if you are taking statins. Grapefruit juice can raise the level of this medicine in your blood. This could increase side effects. Eat more fruits, vegetables, and fiber  · Fruits and vegetables have lots of nutrients that help protect against heart disease, and they have little--if any--fat. Try to eat at least five servings a day. Dark green, deep orange, or yellow fruits and vegetables are healthy choices. · Keep carrots, celery, and other veggies handy for snacks.  Buy fruit that is in season and store it where you can see it so that you will be tempted to eat it. Cook dishes that have a lot of veggies in them, such as stir-fries and soups. · Foods high in fiber may reduce your cholesterol and provide important vitamins and minerals. High-fiber foods include whole-grain cereals and breads, oatmeal, beans, brown rice, citrus fruits, and apples. · Buy whole-grain breads and cereals instead of white bread and pastries. Limit saturated fat  · Read food labels and try to avoid saturated fat and trans fat. They increase your risk of heart disease. · Use olive or canola oil when you cook. Try cholesterol-lowering spreads, such as Benecol or Take Control. · Bake, broil, grill, or steam foods instead of frying them. · Limit the amount of high-fat meats you eat, including hot dogs and sausages. Cut out all visible fat when you prepare meat. · Eat fish, skinless poultry, and soy products such as tofu instead of high-fat meats. Soybeans may be especially good for your heart. Eat at least two servings of fish a week. Certain fish, such as salmon, contain omega-3 fatty acids, which may help reduce your risk of heart attack. · Choose low-fat or fat-free milk and dairy products. Get exercise, limit alcohol, and quit smoking  · Get more exercise. Work with your doctor to set up an exercise program. Even if you can do only a small amount, exercise will help you get stronger, have more energy, and manage your weight and your stress. Walking is an easy way to get exercise. Gradually increase the amount you walk every day. Aim for at least 30 minutes on most days of the week. You also may want to swim, bike, or do other activities. · Limit alcohol to no more than 2 drinks a day for men and 1 drink a day for women. · Do not smoke. If you need help quitting, talk to your doctor about stop-smoking programs and medicines. These can increase your chances of quitting for good. When should you call for help?   Call 911 anytime you think you may need emergency care. For example, call if:  · You have symptoms of a heart attack. These may include:  ¨ Chest pain or pressure, or a strange feeling in the chest.  ¨ Sweating. ¨ Shortness of breath. ¨ Nausea or vomiting. ¨ Pain, pressure, or a strange feeling in the back, neck, jaw, or upper belly or in one or both shoulders or arms. ¨ Lightheadedness or sudden weakness. ¨ A fast or irregular heartbeat. After you call 911, the  may tell you to chew 1 adult-strength or 2 to 4 low-dose aspirin. Wait for an ambulance. Do not try to drive yourself. · You have signs of a stroke. These may include:  ¨ Sudden numbness, paralysis, or weakness in your face, arm, or leg, especially on only one side of your body. ¨ New problems with walking or balance. ¨ Sudden vision changes. ¨ Drooling or slurred speech. ¨ New problems speaking or understanding simple statements, or feeling confused. ¨ A sudden, severe headache that is different from past headaches. · You passed out (lost consciousness). Call your doctor now or seek immediate medical care if:  · You have muscle pain or weakness. Watch closely for changes in your health, and be sure to contact your doctor if:  · You are very tired. · You have an upset stomach, gas, constipation, or belly pain or cramps. Where can you learn more? Go to Dualog.be  Enter C406 in the search box to learn more about \"Hyperlipidemia: After Your Visit. \"   © 9647-9814 Healthwise, Incorporated. Care instructions adapted under license by [employer] (which disclaims liability or warranty for this information). This care instruction is for use with your licensed healthcare professional. If you have questions about a medical condition or this instruction, always ask your healthcare professional. [place] [address] any warranty or liability for your use of this information.   Content Version: 2.2.415291; Last Revised: October 13, 2011

## 2018-04-18 DIAGNOSIS — E28.2 PCOS (POLYCYSTIC OVARIAN SYNDROME): Primary | ICD-10-CM

## 2018-04-18 RX ORDER — METFORMIN HYDROCHLORIDE 500 MG/1
500 TABLET, FILM COATED, EXTENDED RELEASE ORAL 2 TIMES DAILY
Qty: 120 TAB | Refills: 1 | Status: SHIPPED | OUTPATIENT
Start: 2018-04-18 | End: 2019-02-01 | Stop reason: CLARIF

## 2018-04-19 ENCOUNTER — OFFICE VISIT (OUTPATIENT)
Dept: OBGYN CLINIC | Age: 33
End: 2018-04-19

## 2018-04-19 ENCOUNTER — CLINICAL SUPPORT (OUTPATIENT)
Dept: FAMILY MEDICINE CLINIC | Age: 33
End: 2018-04-19

## 2018-04-19 ENCOUNTER — HOSPITAL ENCOUNTER (OUTPATIENT)
Dept: LAB | Age: 33
Discharge: HOME | End: 2018-04-19
Payer: COMMERCIAL

## 2018-04-19 VITALS
HEART RATE: 83 BPM | HEIGHT: 68 IN | SYSTOLIC BLOOD PRESSURE: 131 MMHG | DIASTOLIC BLOOD PRESSURE: 74 MMHG | WEIGHT: 269 LBS | BODY MASS INDEX: 40.77 KG/M2

## 2018-04-19 DIAGNOSIS — E28.2 PCOS (POLYCYSTIC OVARIAN SYNDROME): ICD-10-CM

## 2018-04-19 DIAGNOSIS — Z01.419 WELL WOMAN EXAM WITH ROUTINE GYNECOLOGICAL EXAM: Primary | ICD-10-CM

## 2018-04-19 DIAGNOSIS — E66.9 OBESITY, UNSPECIFIED CLASSIFICATION, UNSPECIFIED OBESITY TYPE, UNSPECIFIED WHETHER SERIOUS COMORBIDITY PRESENT: Primary | ICD-10-CM

## 2018-04-19 DIAGNOSIS — J45.909 UNCOMPLICATED ASTHMA, UNSPECIFIED ASTHMA SEVERITY, UNSPECIFIED WHETHER PERSISTENT: Primary | ICD-10-CM

## 2018-04-19 PROCEDURE — 88175 CYTOPATH C/V AUTO FLUID REDO: CPT | Performed by: OBSTETRICS & GYNECOLOGY

## 2018-04-19 PROCEDURE — 87624 HPV HI-RISK TYP POOLED RSLT: CPT | Performed by: OBSTETRICS & GYNECOLOGY

## 2018-04-19 NOTE — PROGRESS NOTES
Patient attended a Medically Supervised Weight Loss New Patient Orientation today where we discussed:  - New Direction Very Low Calorie Diet details  - Medical Supervision  - Nutrition education  - Cost of Meal Replacements  - Policies and compliance required for program enrollment.      Patients initial consultation with physician is tentatively scheduled for:  Future Appointments  Date Time Provider Ed [place]olla   5/3/2018 1:30 PM Leni Lee DO [address] [facility] 8448   5/8/2018 8:00 AM Alyx Velarde MD [address] [facility] 3208

## 2018-04-19 NOTE — PATIENT INSTRUCTIONS
Pap Test: Care Instructions  Your Care Instructions    The Pap test (also called a Pap smear) is a screening test for cancer of the cervix, which is the lower part of the uterus that opens into the vagina. The test can help your doctor find early changes in the cells that could lead to cancer. The sample of cells taken during your test has been sent to a lab so that an expert can look at the cells. It usually takes a week or two to get the results back. Follow-up care is a key part of your treatment and safety. Be sure to make and go to all appointments, and call your doctor if you are having problems. It's also a good idea to know your test results and keep a list of the medicines you take. What do the results mean? · A normal result means that the test did not find any abnormal cells in the sample. · An abnormal result can mean many things. Most of these are not cancer. The results of your test may be abnormal because:  ¨ You have an infection of the vagina or cervix, such as a yeast infection. ¨ You have an IUD (intrauterine device for birth control). ¨ You have low estrogen levels after menopause that are causing the cells to change. ¨ You have cell changes that may be a sign of precancer or cancer. The results are ranked based on how serious the changes might be. There are many other reasons why you might not get a normal result. If the results were abnormal, you may need to get another test within a few weeks or months. If the results show changes that could be a sign of cancer, you may need a test called a colposcopy, which provides a more complete view of the cervix. Sometimes the lab cannot use the sample because it does not contain enough cells or was not preserved well. If so, you may need to have the test again. This is not common, but it does happen from time to time. When should you call for help?   Watch closely for changes in your health, and be sure to contact your doctor if:  ? · You have vaginal bleeding or pain for more than 2 days after the test. It is normal to have a small amount of bleeding for a day or two after the test.   Where can you learn more? Go to http://mateo-claudia.info/. Enter K487 in the search box to learn more about \"Pap Test: Care Instructions. \"  Current as of: May 12, 2017  Content Version: 11.4  © 2782-8618 [employer]. Care instructions adapted under license by [employer] (which disclaims liability or warranty for this information). If you have questions about a medical condition or this instruction, always ask your healthcare professional. [address] any warranty or liability for your use of this information.

## 2018-04-19 NOTE — MR AVS SNAPSHOT
[facility] [address] [place] [address] [id number] 
[phone] Patient: True Letters MRN: [id number] VNO:[id number] Visit Information Date & Time Provider Department Dept. Phone Encounter #  
 4/19/2018  1:00 PM Agueda Trotter MD [facility] [phone] [id number] Your Appointments 5/3/2018  7:07 PM  
METABOLIC PROGRAM 60 with DO [facility] ([facility]) Appt Note: MSWL Consult. [place]  
  
   
 [address] [phone]  
  
    
 5/8/2018  8:00 AM  
Follow Up with Alyx Velarde MD  
[place] [address] ([facility]) Appt Note: follow-up 30min [place]  
  
   
 [address] [place] Upcoming Health Maintenance Date Due  
 PAP AKA CERVICAL CYTOLOGY 8/31/2004 Allergies as of 4/19/2018  Review Complete On: 4/19/2018 By: Agueda Trotter MD  
  
 Severity Noted Reaction Type Reactions Latex  03/12/2018    Atopic Dermatitis Nubain [Nalbuphine] Medium 03/12/2018    Hives Sulfa (Sulfonamide Antibiotics) Medium 03/12/2018    Hives Shellfish Derived  03/12/2018    Hives Current Immunizations  Never Reviewed Name Date Influenza Nasal Vaccine (Quad) 10/12/2017 Not reviewed this visit You Were Diagnosed With   
  
 Codes Comments PCOS (polycystic ovarian syndrome)     ICD-10-CM: E28.2 ICD-9-CM: 256.4 Vitals BP Pulse Height(growth percentile) Weight(growth percentile) LMP BMI  
 131/74 83 5' 8\" (1.727 m) 269 lb (122 kg) 04/09/2018 (Exact Date) 40.9 kg/m2 Smoking Status Never Smoker BMI and BSA Data Body Mass Index Body Surface Area 40.9 kg/m 2 2.42 m 2 Preferred Pharmacy Pharmacy Name Phone [facility] [address] [facility] [phone] Your Updated Medication List  
  
   
This list is accurate as of 4/19/18  2:18 PM.  Always use your most recent med list.  
  
  
  
  
 albuterol sulfate 90 mcg/actuation Aepb Commonly known as:  Jose Gillianuet Take 2 Puffs by inhalation every four (4) hours as needed. BREO ELLIPTA 200-25 mcg/dose inhaler Generic drug:  fluticasone-vilanterol Take 1 Puff by inhalation daily. buPROPion  mg SR tablet Commonly known as:  Vivian Peyton Take 1 Tab by mouth two (2) times a day. Start with 150 mg once daily in the morning; increase to 150 mg twice daily after 3 days. cholecalciferol 1,000 unit tablet Commonly known as:  VITAMIN D3 Take 1 Tab by mouth daily. metFORMIN 500 mg Tg24 24 hour tablet Commonly known asElio Preston ER Take 500 mg by mouth two (2) times a day. Indications: Polycystic Ovarian Syndrome PEPCID 20 mg tablet Generic drug:  famotidine Take 20 mg by mouth as needed. SINGULAIR 10 mg tablet Generic drug:  montelukast  
Take 10 mg by mouth daily. VALTREX 1 gram tablet Generic drug:  valACYclovir Take  by mouth daily. ZyrTEC 10 mg tablet Generic drug:  cetirizine Take  by mouth. Patient Instructions Pap Test: Care Instructions Your Care Instructions The Pap test (also called a Pap smear) is a screening test for cancer of the cervix, which is the lower part of the uterus that opens into the vagina. The test can help your doctor find early changes in the cells that could lead to cancer. The sample of cells taken during your test has been sent to a lab so that an expert can look at the cells. It usually takes a week or two to get the results back. Follow-up care is a key part of your treatment and safety.  Be sure to make and go to all appointments, and call your doctor if you are having problems. It's also a good idea to know your test results and keep a list of the medicines you take. What do the results mean? · A normal result means that the test did not find any abnormal cells in the sample. · An abnormal result can mean many things. Most of these are not cancer. The results of your test may be abnormal because: 
¨ You have an infection of the vagina or cervix, such as a yeast infection. ¨ You have an IUD (intrauterine device for birth control). ¨ You have low estrogen levels after menopause that are causing the cells to change. ¨ You have cell changes that may be a sign of precancer or cancer. The results are ranked based on how serious the changes might be. There are many other reasons why you might not get a normal result. If the results were abnormal, you may need to get another test within a few weeks or months. If the results show changes that could be a sign of cancer, you may need a test called a colposcopy, which provides a more complete view of the cervix. Sometimes the lab cannot use the sample because it does not contain enough cells or was not preserved well. If so, you may need to have the test again. This is not common, but it does happen from time to time. When should you call for help? Watch closely for changes in your health, and be sure to contact your doctor if: 
? · You have vaginal bleeding or pain for more than 2 days after the test. It is normal to have a small amount of bleeding for a day or two after the test.  
Where can you learn more? Go to http://mateo-claudia.info/. Enter T428 in the search box to learn more about \"Pap Test: Care Instructions. \" Current as of: May 12, 2017 Content Version: 11.4 © 6600-7972 [employer]. Care instructions adapted under license by [employer] (which disclaims liability or warranty for this information).  If you have questions about a medical condition or this instruction, always ask your healthcare professional. [address] any warranty or liability for your use of this information. Introducing [facility] & HEALTH [facility]! Dear Hima Solano: Thank you for requesting a [employer] account. Our records indicate that you already have an active [employer] account. You can access your account anytime at [email or web address] Did you know that you can access your hospital and ER discharge instructions at any time in [employer]? You can also review all of your test results from your hospital stay or ER visit. Additional Information If you have questions, please visit the Frequently Asked Questions section of the [employer] website at [email or web address] Remember, [employer] is NOT to be used for urgent needs. For medical emergencies, dial 911. Now available from your iPhone and Android! Please provide this summary of care documentation to your next provider. Your primary care clinician is listed as José Bello. If you have any questions after today's visit, please call [phone].

## 2018-04-19 NOTE — PROGRESS NOTES
Subjective:   27 y.o. female for Well Woman Check. She has known PCOS. She denies any other concerns. She isn't sexually active. Patient's last menstrual period was 04/09/2018 (exact date). Social History: not sexually active. Pertinent past medical hstory: no history of HTN, DVT, CAD, DM, liver disease, migraines or smoking. Patient Active Problem List   Diagnosis Code    [facility] E66.01    PCOS (polycystic ovarian syndrome) E28.2     Current Outpatient Prescriptions   Medication Sig Dispense Refill    albuterol sulfate (PROAIR RESPICLICK) 90 mcg/actuation aepb Take 2 Puffs by inhalation every four (4) hours as needed. 1 Inhaler 12    metFORMIN (GLUMETZA ER) 500 mg TG24 24 hour tablet Take 500 mg by mouth two (2) times a day. Indications: Polycystic Ovarian Syndrome 120 Tab 1    buPROPion SR (WELLBUTRIN SR) 150 mg SR tablet Take 1 Tab by mouth two (2) times a day. Start with 150 mg once daily in the morning; increase to 150 mg twice daily after 3 days. 60 Tab 1    cholecalciferol (VITAMIN D3) 1,000 unit tablet Take 1 Tab by mouth daily. 90 Tab 1    cetirizine (ZYRTEC) 10 mg tablet Take  by mouth.  montelukast (SINGULAIR) 10 mg tablet Take 10 mg by mouth daily.  fluticasone-vilanterol (BREO ELLIPTA) 200-25 mcg/dose inhaler Take 1 Puff by inhalation daily.  valACYclovir (VALTREX) 1 gram tablet Take  by mouth daily.  famotidine (PEPCID) 20 mg tablet Take 20 mg by mouth as needed.        Allergies   Allergen Reactions    Latex Atopic Dermatitis    Nubain [Nalbuphine] Hives    Sulfa (Sulfonamide Antibiotics) Hives    Shellfish Derived Hives     Past Medical History:   Diagnosis Date    Anxiety     Asthma     Environmental allergies     GERD (gastroesophageal reflux disease)     HLD (hyperlipidemia)     HSV-2 infection     PCOS (polycystic ovarian syndrome)     Prediabetes     Vitamin D deficiency      Past Surgical History:   Procedure Laterality Date    HX BREAST LUMPECTOMY Left 2013    HX KNEE ARTHROSCOPY Right 2010    HX WISDOM TEETH EXTRACTION  2003     Family History   Problem Relation Age of Onset    Asthma Mother     GERD Mother     Diabetes Father     Sleep Apnea Father     Hypertension Father     Elevated Lipids Father     GERD Brother     Cancer Paternal Grandmother      throat 2/2 smoking     Social History   Substance Use Topics    Smoking status: Never Smoker    Smokeless tobacco: Never Used    Alcohol use Yes      Comment: Socially        ROS:  Feeling well. No dyspnea or chest pain on exertion. No abdominal pain, change in bowel habits, black or bloody stools. No urinary tract symptoms. GYN ROS: no breast pain or new or enlarging lumps on self exam, she complains of chronic intermittent amenorrhea. No neurological complaints. Objective:     Visit Vitals    /74    Pulse 83    Ht 5' 8\" (1.727 m)    Wt 269 lb (122 kg)    LMP 04/09/2018 (Exact Date)    BMI 40.9 kg/m2     The patient appears well, alert, oriented x 3, in no distress. ENT normal.  Neck supple. No adenopathy or thyromegaly. HI. Lungs are clear, good air entry, no wheezes, rhonchi or rales. S1 and S2 normal, no murmurs, regular rate and rhythm. Abdomen soft without tenderness, guarding, mass or organomegaly. Extremities show no edema, normal peripheral pulses. Neurological is normal, no focal findings. BREAST EXAM: breasts appear normal, no suspicious masses, no skin or nipple changes or axillary nodes    PELVIC EXAM: normal external genitalia, vulva, vagina, cervix, uterus and adnexa    Assessment/Plan:   well woman  pap smear done. Declined STD testing. counseled on breast self exam and family planning choices  return annually or prn    ICD-10-CM ICD-9-CM    1. PCOS (polycystic ovarian syndrome) E28.2 256.4    .

## 2018-05-07 ENCOUNTER — OFFICE VISIT (OUTPATIENT)
Dept: FAMILY MEDICINE CLINIC | Age: 33
End: 2018-05-07

## 2018-05-07 VITALS
OXYGEN SATURATION: 97 % | WEIGHT: 263.2 LBS | DIASTOLIC BLOOD PRESSURE: 71 MMHG | TEMPERATURE: 98 F | HEART RATE: 75 BPM | HEIGHT: 68 IN | BODY MASS INDEX: 39.89 KG/M2 | SYSTOLIC BLOOD PRESSURE: 113 MMHG | RESPIRATION RATE: 17 BRPM

## 2018-05-07 DIAGNOSIS — F41.8 DEPRESSION WITH ANXIETY: ICD-10-CM

## 2018-05-07 DIAGNOSIS — E66.01 CLASS 3 SEVERE OBESITY DUE TO EXCESS CALORIES WITHOUT SERIOUS COMORBIDITY WITH BODY MASS INDEX (BMI) OF 40.0 TO 44.9 IN ADULT (HCC): ICD-10-CM

## 2018-05-07 DIAGNOSIS — F51.05 INSOMNIA SECONDARY TO ANXIETY: Primary | ICD-10-CM

## 2018-05-07 DIAGNOSIS — F41.9 INSOMNIA SECONDARY TO ANXIETY: Primary | ICD-10-CM

## 2018-05-07 RX ORDER — EPINEPHRINE 0.3 MG/.3ML
INJECTION INTRAMUSCULAR
Refills: 0 | COMMUNITY
Start: 2018-03-22

## 2018-05-07 RX ORDER — FLUTICASONE PROPIONATE 220 UG/1
2 AEROSOL, METERED RESPIRATORY (INHALATION) DAILY
COMMUNITY
End: 2018-09-11

## 2018-05-07 RX ORDER — DOXEPIN HYDROCHLORIDE 10 MG/1
10 CAPSULE ORAL
Qty: 30 CAP | Refills: 1 | Status: SHIPPED | OUTPATIENT
Start: 2018-05-07 | End: 2018-07-12 | Stop reason: ALTCHOICE

## 2018-05-07 NOTE — PROGRESS NOTES
[facility]    CC: Follow up on mood and weight    HPI:   Depressed Mood:  - Feels more anxious w/ Wellbutrin  - Reports that mood is exacerbated by current one year anniversary of GM passing a way  - Endorses issues with falling asleep  - Has previously tried Buspar (No side effects.  Not effective), trazodone (caused her to be too sleepy), Prozac (dizzy), Fluvoxamine (dizzy), Effexor (Weight gain)      Morbid Obesity:  - Wellbutrin has helped with her appetite, but has caused her to feel more anxious  - Weight loss diet has been going well  - Is still considering pursuing the medical weight loss program  - Has not been following any exercise regimen      ROS: Positive items marked in RED  CON: fever, chills, fatigue  Cardiovascular: palpitations, CP  Resp: cough, SOB  GI: nausea, vomiting, diarrhea  : dysuria, hematuria      Past Medical History:   Diagnosis Date    Anxiety     Asthma     Environmental allergies     GERD (gastroesophageal reflux disease)     HLD (hyperlipidemia)     HSV-2 infection     PCOS (polycystic ovarian syndrome)     Prediabetes     Vitamin D deficiency        Past Surgical History:   Procedure Laterality Date    HX BREAST LUMPECTOMY Left 2013    HX KNEE ARTHROSCOPY Right 2010    HX WISDOM TEETH EXTRACTION         Family History   Problem Relation Age of Onset    Asthma Mother     GERD Mother     Diabetes Father     Sleep Apnea Father     Hypertension Father     Elevated Lipids Father     GERD Brother     Cancer Paternal Grandmother      throat 2/2 smoking       Social History     Social History    Marital status: LEGALLY      Spouse name: N/A    Number of children: N/A    Years of education: N/A     Occupational History    RN      Social History Main Topics    Smoking status: Never Smoker    Smokeless tobacco: Never Used    Alcohol use Yes      Comment: 1 glass of wine a month    Drug use: No    Sexual activity: No     Other Topics Concern    None     Social History Narrative       Allergies   Allergen Reactions    Latex Atopic Dermatitis    Nubain [Nalbuphine] Hives    Sulfa (Sulfonamide Antibiotics) Hives    Shellfish Derived Hives         Current Outpatient Prescriptions:     fluticasone (FLOVENT HFA) 220 mcg/actuation inhaler, Take 2 Puffs by inhalation daily. , Disp: , Rfl:     albuterol sulfate (PROAIR RESPICLICK) 90 mcg/actuation aepb, Take 2 Puffs by inhalation every four (4) hours as needed. , Disp: 1 Inhaler, Rfl: 12    metFORMIN (GLUMETZA ER) 500 mg TG24 24 hour tablet, Take 500 mg by mouth two (2) times a day. Indications: Polycystic Ovarian Syndrome, Disp: 120 Tab, Rfl: 1    buPROPion SR (WELLBUTRIN SR) 150 mg SR tablet, Take 1 Tab by mouth two (2) times a day. Start with 150 mg once daily in the morning; increase to 150 mg twice daily after 3 days. , Disp: 60 Tab, Rfl: 1    cholecalciferol (VITAMIN D3) 1,000 unit tablet, Take 1 Tab by mouth daily. , Disp: 90 Tab, Rfl: 1    cetirizine (ZYRTEC) 10 mg tablet, Take  by mouth., Disp: , Rfl:     montelukast (SINGULAIR) 10 mg tablet, Take 10 mg by mouth daily. , Disp: , Rfl:     valACYclovir (VALTREX) 1 gram tablet, Take  by mouth daily. , Disp: , Rfl:     famotidine (PEPCID) 20 mg tablet, Take 20 mg by mouth as needed. , Disp: , Rfl:     EPIPEN 2-BOB 0.3 mg/0.3 mL injection, INJECT 1 PEN INTO LATERAL THIGH FOR SYMPTOMS OF ANAPHYLAXIS., Disp: , Rfl: 0    fluticasone-vilanterol (BREO ELLIPTA) 200-25 mcg/dose inhaler, Take 1 Puff by inhalation daily. , Disp: , Rfl:     Physical Exam:      Ht 5' 8\" (1.727 m)  Wt 263 lb 3.2 oz (119.4 kg)  LMP 04/09/2018 (Exact Date)  BMI 40.02 kg/m2    General:  Obese habitus, NAD  Eyes: sclera clear bilaterally, no discharge noted, eyelids normal in appearance  HENT: NCAT  Lungs: CTAB, Normal respiratory effort and rate  CV: RRR, no MRGs  ABD: soft, non-tender, non-distended, normal bowel sounds  Skin: normal temperature, turgor, color, and texture  Psych: alert and oriented to person, place and time, normal affect  Neuro: Speech normal, Moving all extremities, gait normal      Assessment/Plan     Depression/Anxiety/Insomnia:  - Will stop Wellbutrin regimen  - Will do trial on low dose of Doxepin  - Handout given on anxiety care, recovering from depression, insomnia care, and sleeping well  - Follow up in one month      Morbid Obesity, Improving:  - Has lost 6 pounds since her last visit  - Advised to continue plan of pursuing the medical weight loss program  - Patient will follow up with Dr. Kierra Fox MD  5/7/2018, 9:45 AM

## 2018-05-07 NOTE — PROGRESS NOTES
Chief Complaint   Patient presents with    Medication Evaluation     PHQ over the last two weeks 5/7/2018   Little interest or pleasure in doing things Several days   Feeling down, depressed or hopeless Several days   Total Score PHQ 2 2       1. Have you been to the ER, urgent care clinic since your last visit? Hospitalized since your last visit? No    2. Have you seen or consulted any other health care providers outside of the [facility] since your last visit? Include any pap smears or colon screening. Yes When: 5/4/2018 Where:  [facility] Reason for visit: immunotherapy

## 2018-05-07 NOTE — MR AVS SNAPSHOT
Cinthia Pinzon [place] [address] [facility] [place]. [phone] [id number] 
[phone] Patient: Booker Alexander MRN: [id number] OCS:1/80/8708 Visit Information Date & Time Provider Department Dept. Phone Encounter #  
 5/7/2018  9:30 AM Earma Andrey, [address] [facility] [phone] [id number] Follow-up Instructions Return in about 1 month (around 6/7/2018) for Sleep and mood. Your Appointments 5/22/2018  6:95 PM  
METABOLIC PROGRAM 60 with DO Edward Kevin 23 ([facility]) Appt Note: MSWL Consult.; r/s  out; r/s  
 [place]  
  
   
 [address] [facility] Upcoming Health Maintenance Date Due Pneumococcal 19-64 Medium Risk (1 of 1 - PPSV23) 8/31/2002 DTaP/Tdap/Td series (1 - Tdap) 8/31/2004 Influenza Age 5 to Adult 8/1/2018 PAP AKA CERVICAL CYTOLOGY 4/19/2021 Allergies as of 5/7/2018  Review Complete On: 5/7/2018 By: Jerri Dill LPN Severity Noted Reaction Type Reactions Latex  03/12/2018    Atopic Dermatitis Nubain [Nalbuphine] Medium 03/12/2018    Hives Sulfa (Sulfonamide Antibiotics) Medium 03/12/2018    Hives Shellfish Derived  03/12/2018    Hives Current Immunizations  Never Reviewed Name Date Influenza Nasal Vaccine (Quad) 10/12/2017 Not reviewed this visit You Were Diagnosed With   
  
 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 
ICD-9-CM: 300.00, 327.02 Depression with anxiety     ICD-10-CM: F41.8 ICD-9-CM: 300.4 Vitals BP Pulse Temp Resp Height(growth percentile) Weight(growth percentile) 113/71 (BP 1 Location: Left arm, BP Patient Position: Sitting) 75 98 °F (36.7 °C) (Oral) 17 5' 8\" (1.727 m) 263 lb 3.2 oz (119.4 kg) LMP SpO2 BMI OB Status Smoking Status 04/09/2018 97% 40.02 kg/m2 Polycystic Ovarian Syndrome Never Smoker Vitals History BMI and BSA Data Body Mass Index Body Surface Area 40.02 kg/m 2 2.39 m 2 Preferred Pharmacy Pharmacy Name Phone [facility] [phone] Your Updated Medication List  
  
   
This list is accurate as of 5/7/18 10:08 AM.  Always use your most recent med list.  
  
  
  
  
 albuterol sulfate 90 mcg/actuation Aepb Commonly known as:  Mitch Current Take 2 Puffs by inhalation every four (4) hours as needed. BREO ELLIPTA 200-25 mcg/dose inhaler Generic drug:  fluticasone-vilanterol Take 1 Puff by inhalation daily. buPROPion  mg SR tablet Commonly known as:  Dalbert Balk Take 1 Tab by mouth two (2) times a day. Start with 150 mg once daily in the morning; increase to 150 mg twice daily after 3 days. cholecalciferol 1,000 unit tablet Commonly known as:  VITAMIN D3 Take 1 Tab by mouth daily. doxepin 10 mg capsule Commonly known as:  SINEquan Take 1 Cap by mouth nightly. EPIPEN 2-BOB 0.3 mg/0.3 mL injection Generic drug:  EPINEPHrine INJECT 1 PEN INTO LATERAL THIGH FOR SYMPTOMS OF ANAPHYLAXIS. FLOVENT  mcg/actuation inhaler Generic drug:  fluticasone Take 2 Puffs by inhalation daily. metFORMIN 500 mg Tg24 24 hour tablet Commonly known asArzella Born ER Take 500 mg by mouth two (2) times a day. Indications: Polycystic Ovarian Syndrome PEPCID 20 mg tablet Generic drug:  famotidine Take 20 mg by mouth as needed. SINGULAIR 10 mg tablet Generic drug:  montelukast  
Take 10 mg by mouth daily. VALTREX 1 gram tablet Generic drug:  valACYclovir Take  by mouth daily. ZyrTEC 10 mg tablet Generic drug:  cetirizine Take  by mouth. Prescriptions Sent to Pharmacy Refills  
 doxepin (SINEQUAN) 10 mg capsule 1 Sig: Take 1 Cap by mouth nightly. Class: Normal  
 Pharmacy: 11 [facility] #: [phone] Route: Oral  
  
Follow-up Instructions Return in about 1 month (around 6/7/2018) for Sleep and mood. Patient Instructions Anxiety Disorder: Care Instructions Your Care Instructions Anxiety is a normal reaction to stress. Difficult situations can cause you to have symptoms such as sweaty palms and a nervous feeling. In an anxiety disorder, the symptoms are far more severe. Constant worry, muscle tension, trouble sleeping, nausea and diarrhea, and other symptoms can make normal daily activities difficult or impossible. These symptoms may occur for no reason, and they can affect your work, school, or social life. Medicines, counseling, and self-care can all help. Follow-up care is a key part of your treatment and safety. Be sure to make and go to all appointments, and call your doctor if you are having problems. It's also a good idea to know your test results and keep a list of the medicines you take. How can you care for yourself at home? · Take medicines exactly as directed. Call your doctor if you think you are having a problem with your medicine. · Go to your counseling sessions and follow-up appointments. · Recognize and accept your anxiety. Then, when you are in a situation that makes you anxious, say to yourself, \"This is not an emergency. I feel uncomfortable, but I am not in danger. I can keep going even if I feel anxious. \" · Be kind to your body: ¨ Relieve tension with exercise or a massage. ¨ Get enough rest. 
¨ Avoid alcohol, caffeine, nicotine, and illegal drugs. They can increase your anxiety level and cause sleep problems. ¨ Learn and do relaxation techniques. See below for more about these techniques. · Engage your mind. Get out and do something you enjoy.  Go to a funny movie, or take a walk or hike. Plan your day. Having too much or too little to do can make you anxious. · Keep a record of your symptoms. Discuss your fears with a good friend or family member, or join a support group for people with similar problems. Talking to others sometimes relieves stress. · Get involved in social groups, or volunteer to help others. Being alone sometimes makes things seem worse than they are. · Get at least 30 minutes of exercise on most days of the week to relieve stress. Walking is a good choice. You also may want to do other activities, such as running, swimming, cycling, or playing tennis or team sports. Relaxation techniques Do relaxation exercises 10 to 20 minutes a day. You can play soothing, relaxing music while you do them, if you wish. · Tell others in your house that you are going to do your relaxation exercises. Ask them not to disturb you. · Find a comfortable place, away from all distractions and noise. · Lie down on your back, or sit with your back straight. · Focus on your breathing. Make it slow and steady. · Breathe in through your nose. Breathe out through either your nose or mouth. · Breathe deeply, filling up the area between your navel and your rib cage. Breathe so that your belly goes up and down. · Do not hold your breath. · Breathe like this for 5 to 10 minutes. Notice the feeling of calmness throughout your whole body. As you continue to breathe slowly and deeply, relax by doing the following for another 5 to 10 minutes: · Tighten and relax each muscle group in your body. You can begin at your toes and work your way up to your head. · Imagine your muscle groups relaxing and becoming heavy. · Empty your mind of all thoughts. · Let yourself relax more and more deeply. · Become aware of the state of calmness that surrounds you.  
· When your relaxation time is over, you can bring yourself back to alertness by moving your fingers and toes and then your hands and feet and then stretching and moving your entire body. Sometimes people fall asleep during relaxation, but they usually wake up shortly afterward. · Always give yourself time to return to full alertness before you drive a car or do anything that might cause an accident if you are not fully alert. Never play a relaxation tape while you drive a car. When should you call for help? Call 911 anytime you think you may need emergency care. For example, call if: 
? · You feel you cannot stop from hurting yourself or someone else. ? Keep the numbers for these national suicide hotlines: 8-829-867-TALK (3-699.119.3048) and 5-760-QRJILGX (3-545.462.1611). If you or someone you know talks about suicide or feeling hopeless, get help right away. ? Watch closely for changes in your health, and be sure to contact your doctor if: 
? · You have anxiety or fear that affects your life. ? · You have symptoms of anxiety that are new or different from those you had before. Where can you learn more? Go to http://mateo-claudia.info/. Enter P754 in the search box to learn more about \"Anxiety Disorder: Care Instructions. \" Current as of: May 12, 2017 Content Version: 11.4 © 3319-7049 rankdesk. Care instructions adapted under license by [employer] (which disclaims liability or warranty for this information). If you have questions about a medical condition or this instruction, always ask your healthcare professional. [place] [address] any warranty or liability for your use of this information. Recovering From Depression: Care Instructions Your Care Instructions Taking good care of yourself is important as you recover from depression. In time, your symptoms will fade as your treatment takes hold. Do not give up. Instead, focus your energy on getting better. Your mood will improve. It just takes some time. Focus on things that can help you feel better, such as being with friends and family, eating well, and getting enough rest. But take things slowly. Do not do too much too soon. You will begin to feel better gradually. Follow-up care is a key part of your treatment and safety. Be sure to make and go to all appointments, and call your doctor if you are having problems. It's also a good idea to know your test results and keep a list of the medicines you take. How can you care for yourself at home? Be realistic · If you have a large task to do, break it up into smaller steps you can handle, and just do what you can. · You may want to put off important decisions until your depression has lifted. If you have plans that will have a major impact on your life, such as marriage, divorce, or a job change, try to wait a bit. Talk it over with friends and loved ones who can help you look at the overall picture first. 
· Reaching out to people for help is important. Do not isolate yourself. Let your family and friends help you. Find someone you can trust and confide in, and talk to that person. · Be patient, and be kind to yourself. Remember that depression is not your fault and is not something you can overcome with willpower alone. Treatment is necessary for depression, just like for any other illness. Feeling better takes time, and your mood will improve little by little. Stay active · Stay busy and get outside. Take a walk, or try some other light exercise. · Talk with your doctor about an exercise program. Exercise can help with mild depression. · Go to a movie or concert. Take part in a Tenriism activity or other social gathering. Go to a ball game. · Ask a friend to have dinner with you. Take care of yourself · Eat a balanced diet with plenty of fresh fruits and vegetables, whole grains, and lean protein.  If you have lost your appetite, eat small snacks rather than large meals. · Avoid drinking alcohol or using illegal drugs. Do not take medicines that have not been prescribed for you. They may interfere with medicines you may be taking for depression, or they may make your depression worse. · Take your medicines exactly as they are prescribed. You may start to feel better within 1 to 3 weeks of taking antidepressant medicine. But it can take as many as 6 to 8 weeks to see more improvement. If you have questions or concerns about your medicines, or if you do not notice any improvement by 3 weeks, talk to your doctor. · If you have any side effects from your medicine, tell your doctor. Antidepressants can make you feel tired, dizzy, or nervous. Some people have dry mouth, constipation, headaches, sexual problems, or diarrhea. Many of these side effects are mild and will go away on their own after you have been taking the medicine for a few weeks. Some may last longer. Talk to your doctor if side effects are bothering you too much. You might be able to try a different medicine. · Get enough sleep. If you have problems sleeping: ¨ Go to bed at the same time every night, and get up at the same time every morning. ¨ Keep your bedroom dark and quiet. ¨ Do not exercise after 5:00 p.m. ¨ Avoid drinks with caffeine after 5:00 p.m. · Avoid sleeping pills unless they are prescribed by the doctor treating your depression. Sleeping pills may make you groggy during the day, and they may interact with other medicine you are taking. · If you have any other illnesses, such as diabetes, heart disease, or high blood pressure, make sure to continue with your treatment. Tell your doctor about all of the medicines you take, including those with or without a prescription. · Keep the numbers for these national suicide hotlines: 3-146-449-TALK (5-422.948.2719) and 1-281-GKMRAEC (4-249.550.9804).  If you or someone you know talks about suicide or feeling hopeless, get help right away. When should you call for help? Call 911 anytime you think you may need emergency care. For example, call if: 
? · You feel like hurting yourself or someone else. ? · Someone you know has depression and is about to attempt or is attempting suicide. ?Call your doctor now or seek immediate medical care if: 
? · You hear voices. ? · Someone you know has depression and: 
¨ Starts to give away his or her possessions. ¨ Uses illegal drugs or drinks alcohol heavily. ¨ Talks or writes about death, including writing suicide notes or talking about guns, knives, or pills. ¨ Starts to spend a lot of time alone. ¨ Acts very aggressively or suddenly appears calm. ? Watch closely for changes in your health, and be sure to contact your doctor if: 
? · You do not get better as expected. Where can you learn more? Go to http://mateoCO-Valueclaudia.info/. Enter A523 in the search box to learn more about \"Recovering From Depression: Care Instructions. \" Current as of: May 12, 2017 Content Version: 11.4 © 4137-0718 117go. Care instructions adapted under license by [employer]oc[employer] (which disclaims liability or warranty for this information). If you have questions about a medical condition or this instruction, always ask your healthcare professional. [address] any warranty or liability for your use of this information. Insomnia: Care Instructions Your Care Instructions Insomnia is the inability to sleep well. It is a common problem for most people at some time. Insomnia may make it hard for you to get to sleep, stay asleep, or sleep as long as you need to. This can make you tired and grouchy during the day. It can also make you forgetful, less effective at work, and unhappy. Insomnia can be caused by conditions such as depression or anxiety.  Pain can also affect your ability to sleep. When these problems are solved, the insomnia usually clears up. But sometimes bad sleep habits can cause insomnia. If insomnia is affecting your work or your enjoyment of life, you can take steps to improve your sleep. Follow-up care is a key part of your treatment and safety. Be sure to make and go to all appointments, and call your doctor if you are having problems. It's also a good idea to know your test results and keep a list of the medicines you take. How can you care for yourself at home? What to avoid · Do not have drinks with caffeine, such as coffee or black tea, for 8 hours before bed. · Do not smoke or use other types of tobacco near bedtime. Nicotine is a stimulant and can keep you awake. · Avoid drinking alcohol late in the evening, because it can cause you to wake in the middle of the night. · Do not eat a big meal close to bedtime. If you are hungry, eat a light snack. · Do not drink a lot of water close to bedtime, because the need to urinate may wake you up during the night. · Do not read or watch TV in bed. Use the bed only for sleeping and sexual activity. What to try · Go to bed at the same time every night, and wake up at the same time every morning. Do not take naps during the day. · Keep your bedroom quiet, dark, and cool. · Sleep on a comfortable pillow and mattress. · If watching the clock makes you anxious, turn it facing away from you so you cannot see the time. · If you worry when you lie down, start a worry book. Well before bedtime, write down your worries, and then set the book and your concerns aside. · Try meditation or other relaxation techniques before you go to bed. · If you cannot fall asleep, get up and go to another room until you feel sleepy. Do something relaxing. Repeat your bedtime routine before you go to bed again. · Make your house quiet and calm about an hour before bedtime.  Turn down the lights, turn off the TV, log off the computer, and turn down the volume on music. This can help you relax after a busy day. When should you call for help? Watch closely for changes in your health, and be sure to contact your doctor if: 
? · Your efforts to improve your sleep do not work. ? · Your insomnia gets worse. ? · You have been feeling down, depressed, or hopeless or have lost interest in things that you usually enjoy. Where can you learn more? Go to http://mateo-claudia.info/. Enter P513 in the search box to learn more about \"Insomnia: Care Instructions. \" Current as of: July 26, 2016 Content Version: 11.4 © 4692-1909 [email or web address]. Care instructions adapted under license by [employer] (which disclaims liability or warranty for this information). If you have questions about a medical condition or this instruction, always ask your healthcare professional. [address] any warranty or liability for your use of this information. Learning About Sleeping Well What does sleeping well mean? Sleeping well means getting enough sleep. How much sleep is enough varies among people. The number of hours you sleep is not as important as how you feel when you wake up. If you do not feel refreshed, you probably need more sleep. Another sign of not getting enough sleep is feeling tired during the day. The average total nightly sleep time is 7½ to 8 hours. Healthy adults may need a little more or a little less than this. Why is getting enough sleep important? Getting enough quality sleep is a basic part of good health. When your sleep suffers, your mood and your thoughts can suffer too. You may find yourself feeling more grumpy or stressed. Not getting enough sleep also can lead to serious problems, including injury, accidents, anxiety, and depression. What might cause poor sleeping? Many things can cause sleep problems, including: · Stress. Stress can be caused by fear about a single event, such as giving a speech. Or you may have ongoing stress, such as worry about work or school. · Depression, anxiety, and other mental or emotional conditions. · Changes in your sleep habits or surroundings. This includes changes that happen where you sleep, such as noise, light, or sleeping in a different bed. It also includes changes in your sleep pattern, such as having jet lag or working a late shift. · Health problems, such as pain, breathing problems, and restless legs syndrome. · Lack of regular exercise. How can you help yourself? Here are some tips that may help you sleep more soundly and wake up feeling more refreshed. Your sleeping area · Use your bedroom only for sleeping and sex. A bit of light reading may help you fall asleep. But if it doesn't, do your reading elsewhere in the house. Don't watch TV in bed. · Be sure your bed is big enough to stretch out comfortably, especially if you have a sleep partner. · Keep your bedroom quiet, dark, and cool. Use curtains, blinds, or a sleep mask to block out light. To block out noise, use earplugs, soothing music, or a \"white noise\" machine. Your evening and bedtime routine · Create a relaxing bedtime routine. You might want to take a warm shower or bath, listen to soothing music, or drink a cup of noncaffeinated tea. · Go to bed at the same time every night. And get up at the same time every morning, even if you feel tired. What to avoid · Limit caffeine (coffee, tea, caffeinated sodas) during the day, and don't have any for at least 4 to 6 hours before bedtime. · Don't drink alcohol before bedtime. Alcohol can cause you to wake up more often during the night. · Don't smoke or use tobacco, especially in the evening. Nicotine can keep you awake. · Don't take naps during the day, especially close to bedtime. · Don't lie in bed awake for too long.  If you can't fall asleep, or if you wake up in the middle of the night and can't get back to sleep within 15 minutes or so, get out of bed and go to another room until you feel sleepy. · Don't take medicine right before bed that may keep you awake or make you feel hyper or energized. Your doctor can tell you if your medicine may do this and if you can take it earlier in the day. If you can't sleep · Imagine yourself in a peaceful, pleasant scene. Focus on the details and feelings of being in a place that is relaxing. · Get up and do a quiet or boring activity until you feel sleepy. · Don't drink any liquids after 6 p.m. if you wake up often because you have to go to the bathroom. Where can you learn more? Go to http://mateo-claudia.info/. Enter A846 in the search box to learn more about \"Learning About Sleeping Well. \" Current as of: May 12, 2017 Content Version: 11.4 © 1143-1935 [employer]. Care instructions adapted under license by [employer] (which disclaims liability or warranty for this information). If you have questions about a medical condition or this instruction, always ask your healthcare professional. [place] [address] any warranty or liability for your use of this information. Introducing [facility]! Dear Emerald Wolf: Thank you for requesting a [place] account. Our records indicate that you already have an active [place] account. You can access your account anytime at [email or web address] Did you know that you can access your hospital and ER discharge instructions at any time in [place]? You can also review all of your test results from your hospital stay or ER visit. Additional Information If you have questions, please visit the Frequently Asked Questions section of the [place] website at [email or web address] Remember, [place] is NOT to be used for urgent needs. For medical emergencies, dial 911. Now available from your iPhone and Android! Please provide this summary of care documentation to your next provider. Your primary care clinician is listed as Daisy Miranda. If you have any questions after today's visit, please call [phone].

## 2018-05-07 NOTE — PATIENT INSTRUCTIONS
Anxiety Disorder: Care Instructions  Your Care Instructions    Anxiety is a normal reaction to stress. Difficult situations can cause you to have symptoms such as sweaty palms and a nervous feeling. In an anxiety disorder, the symptoms are far more severe. Constant worry, muscle tension, trouble sleeping, nausea and diarrhea, and other symptoms can make normal daily activities difficult or impossible. These symptoms may occur for no reason, and they can affect your work, school, or social life. Medicines, counseling, and self-care can all help. Follow-up care is a key part of your treatment and safety. Be sure to make and go to all appointments, and call your doctor if you are having problems. It's also a good idea to know your test results and keep a list of the medicines you take. How can you care for yourself at home? · Take medicines exactly as directed. Call your doctor if you think you are having a problem with your medicine. · Go to your counseling sessions and follow-up appointments. · Recognize and accept your anxiety. Then, when you are in a situation that makes you anxious, say to yourself, \"This is not an emergency. I feel uncomfortable, but I am not in danger. I can keep going even if I feel anxious. \"  · Be kind to your body:  ¨ Relieve tension with exercise or a massage. ¨ Get enough rest.  ¨ Avoid alcohol, caffeine, nicotine, and illegal drugs. They can increase your anxiety level and cause sleep problems. ¨ Learn and do relaxation techniques. See below for more about these techniques. · Engage your mind. Get out and do something you enjoy. Go to a funny movie, or take a walk or hike. Plan your day. Having too much or too little to do can make you anxious. · Keep a record of your symptoms. Discuss your fears with a good friend or family member, or join a support group for people with similar problems. Talking to others sometimes relieves stress.   · Get involved in social groups, or volunteer to help others. Being alone sometimes makes things seem worse than they are. · Get at least 30 minutes of exercise on most days of the week to relieve stress. Walking is a good choice. You also may want to do other activities, such as running, swimming, cycling, or playing tennis or team sports. Relaxation techniques  Do relaxation exercises 10 to 20 minutes a day. You can play soothing, relaxing music while you do them, if you wish. · Tell others in your house that you are going to do your relaxation exercises. Ask them not to disturb you. · Find a comfortable place, away from all distractions and noise. · Lie down on your back, or sit with your back straight. · Focus on your breathing. Make it slow and steady. · Breathe in through your nose. Breathe out through either your nose or mouth. · Breathe deeply, filling up the area between your navel and your rib cage. Breathe so that your belly goes up and down. · Do not hold your breath. · Breathe like this for 5 to 10 minutes. Notice the feeling of calmness throughout your whole body. As you continue to breathe slowly and deeply, relax by doing the following for another 5 to 10 minutes:  · Tighten and relax each muscle group in your body. You can begin at your toes and work your way up to your head. · Imagine your muscle groups relaxing and becoming heavy. · Empty your mind of all thoughts. · Let yourself relax more and more deeply. · Become aware of the state of calmness that surrounds you. · When your relaxation time is over, you can bring yourself back to alertness by moving your fingers and toes and then your hands and feet and then stretching and moving your entire body. Sometimes people fall asleep during relaxation, but they usually wake up shortly afterward. · Always give yourself time to return to full alertness before you drive a car or do anything that might cause an accident if you are not fully alert.  Never play a relaxation tape while you drive a car. When should you call for help? Call 911 anytime you think you may need emergency care. For example, call if:  ? · You feel you cannot stop from hurting yourself or someone else. ? Keep the numbers for these national suicide hotlines: 6-314-637-TALK (0-371.294.7286) and 1-806-RRQONGA (3-219.512.4902). If you or someone you know talks about suicide or feeling hopeless, get help right away. ? Watch closely for changes in your health, and be sure to contact your doctor if:  ? · You have anxiety or fear that affects your life. ? · You have symptoms of anxiety that are new or different from those you had before. Where can you learn more? Go to http://mateo-claudia.info/. Enter P754 in the search box to learn more about \"Anxiety Disorder: Care Instructions. \"  Current as of: May 12, 2017  Content Version: 11.4  © 9114-5996 [employer]. Care instructions adapted under license by [employer] (which disclaims liability or warranty for this information). If you have questions about a medical condition or this instruction, always ask your healthcare professional. [address] any warranty or liability for your use of this information. Recovering From Depression: Care Instructions  Your Care Instructions    Taking good care of yourself is important as you recover from depression. In time, your symptoms will fade as your treatment takes hold. Do not give up. Instead, focus your energy on getting better. Your mood will improve. It just takes some time. Focus on things that can help you feel better, such as being with friends and family, eating well, and getting enough rest. But take things slowly. Do not do too much too soon. You will begin to feel better gradually. Follow-up care is a key part of your treatment and safety. Be sure to make and go to all appointments, and call your doctor if you are having problems.  It's also a good idea to know your test results and keep a list of the medicines you take. How can you care for yourself at home? Be realistic  · If you have a large task to do, break it up into smaller steps you can handle, and just do what you can. · You may want to put off important decisions until your depression has lifted. If you have plans that will have a major impact on your life, such as marriage, divorce, or a job change, try to wait a bit. Talk it over with friends and loved ones who can help you look at the overall picture first.  · Reaching out to people for help is important. Do not isolate yourself. Let your family and friends help you. Find someone you can trust and confide in, and talk to that person. · Be patient, and be kind to yourself. Remember that depression is not your fault and is not something you can overcome with willpower alone. Treatment is necessary for depression, just like for any other illness. Feeling better takes time, and your mood will improve little by little. Stay active  · Stay busy and get outside. Take a walk, or try some other light exercise. · Talk with your doctor about an exercise program. Exercise can help with mild depression. · Go to a movie or concert. Take part in a Christianity activity or other social gathering. Go to a ball game. · Ask a friend to have dinner with you. Take care of yourself  · Eat a balanced diet with plenty of fresh fruits and vegetables, whole grains, and lean protein. If you have lost your appetite, eat small snacks rather than large meals. · Avoid drinking alcohol or using illegal drugs. Do not take medicines that have not been prescribed for you. They may interfere with medicines you may be taking for depression, or they may make your depression worse. · Take your medicines exactly as they are prescribed. You may start to feel better within 1 to 3 weeks of taking antidepressant medicine.  But it can take as many as 6 to 8 weeks to see more improvement. If you have questions or concerns about your medicines, or if you do not notice any improvement by 3 weeks, talk to your doctor. · If you have any side effects from your medicine, tell your doctor. Antidepressants can make you feel tired, dizzy, or nervous. Some people have dry mouth, constipation, headaches, sexual problems, or diarrhea. Many of these side effects are mild and will go away on their own after you have been taking the medicine for a few weeks. Some may last longer. Talk to your doctor if side effects are bothering you too much. You might be able to try a different medicine. · Get enough sleep. If you have problems sleeping:  ¨ Go to bed at the same time every night, and get up at the same time every morning. ¨ Keep your bedroom dark and quiet. ¨ Do not exercise after 5:00 p.m. ¨ Avoid drinks with caffeine after 5:00 p.m. · Avoid sleeping pills unless they are prescribed by the doctor treating your depression. Sleeping pills may make you groggy during the day, and they may interact with other medicine you are taking. · If you have any other illnesses, such as diabetes, heart disease, or high blood pressure, make sure to continue with your treatment. Tell your doctor about all of the medicines you take, including those with or without a prescription. · Keep the numbers for these national suicide hotlines: 1-314-988-TALK (5-427.932.5365) and 5-338-MRLEEXW (7-593.914.8567). If you or someone you know talks about suicide or feeling hopeless, get help right away. When should you call for help? Call 911 anytime you think you may need emergency care. For example, call if:  ? · You feel like hurting yourself or someone else. ? · Someone you know has depression and is about to attempt or is attempting suicide. ?Call your doctor now or seek immediate medical care if:  ? · You hear voices. ? · Someone you know has depression and:  ¨ Starts to give away his or her possessions.   ¨ Uses illegal drugs or drinks alcohol heavily. ¨ Talks or writes about death, including writing suicide notes or talking about guns, knives, or pills. ¨ Starts to spend a lot of time alone. ¨ Acts very aggressively or suddenly appears calm. ? Watch closely for changes in your health, and be sure to contact your doctor if:  ? · You do not get better as expected. Where can you learn more? Go to http://mateo-claudia.info/. Enter J385 in the search box to learn more about \"Recovering From Depression: Care Instructions. \"  Current as of: May 12, 2017  Content Version: 11.4  © 8377-9485 [employer]. Care instructions adapted under license by [employer] (which disclaims liability or warranty for this information). If you have questions about a medical condition or this instruction, always ask your healthcare professional. [place] [address] any warranty or liability for your use of this information. Insomnia: Care Instructions  Your Care Instructions    Insomnia is the inability to sleep well. It is a common problem for most people at some time. Insomnia may make it hard for you to get to sleep, stay asleep, or sleep as long as you need to. This can make you tired and grouchy during the day. It can also make you forgetful, less effective at work, and unhappy. Insomnia can be caused by conditions such as depression or anxiety. Pain can also affect your ability to sleep. When these problems are solved, the insomnia usually clears up. But sometimes bad sleep habits can cause insomnia. If insomnia is affecting your work or your enjoyment of life, you can take steps to improve your sleep. Follow-up care is a key part of your treatment and safety. Be sure to make and go to all appointments, and call your doctor if you are having problems. It's also a good idea to know your test results and keep a list of the medicines you take.   How can you care for yourself at home?  What to avoid  · Do not have drinks with caffeine, such as coffee or black tea, for 8 hours before bed. · Do not smoke or use other types of tobacco near bedtime. Nicotine is a stimulant and can keep you awake. · Avoid drinking alcohol late in the evening, because it can cause you to wake in the middle of the night. · Do not eat a big meal close to bedtime. If you are hungry, eat a light snack. · Do not drink a lot of water close to bedtime, because the need to urinate may wake you up during the night. · Do not read or watch TV in bed. Use the bed only for sleeping and sexual activity. What to try  · Go to bed at the same time every night, and wake up at the same time every morning. Do not take naps during the day. · Keep your bedroom quiet, dark, and cool. · Sleep on a comfortable pillow and mattress. · If watching the clock makes you anxious, turn it facing away from you so you cannot see the time. · If you worry when you lie down, start a worry book. Well before bedtime, write down your worries, and then set the book and your concerns aside. · Try meditation or other relaxation techniques before you go to bed. · If you cannot fall asleep, get up and go to another room until you feel sleepy. Do something relaxing. Repeat your bedtime routine before you go to bed again. · Make your house quiet and calm about an hour before bedtime. Turn down the lights, turn off the TV, log off the computer, and turn down the volume on music. This can help you relax after a busy day. When should you call for help? Watch closely for changes in your health, and be sure to contact your doctor if:  ? · Your efforts to improve your sleep do not work. ? · Your insomnia gets worse. ? · You have been feeling down, depressed, or hopeless or have lost interest in things that you usually enjoy. Where can you learn more? Go to http://erin.info/.   Enter P513 in the search box to learn more about \"Insomnia: Care Instructions. \"  Current as of: July 26, 2016  Content Version: 11.4  © 5532-8972 [employer]. Care instructions adapted under license by [employer] (which disclaims liability or warranty for this information). If you have questions about a medical condition or this instruction, always ask your healthcare professional. [address] any warranty or liability for your use of this information. Learning About Sleeping Well  What does sleeping well mean? Sleeping well means getting enough sleep. How much sleep is enough varies among people. The number of hours you sleep is not as important as how you feel when you wake up. If you do not feel refreshed, you probably need more sleep. Another sign of not getting enough sleep is feeling tired during the day. The average total nightly sleep time is 7½ to 8 hours. Healthy adults may need a little more or a little less than this. Why is getting enough sleep important? Getting enough quality sleep is a basic part of good health. When your sleep suffers, your mood and your thoughts can suffer too. You may find yourself feeling more grumpy or stressed. Not getting enough sleep also can lead to serious problems, including injury, accidents, anxiety, and depression. What might cause poor sleeping? Many things can cause sleep problems, including:  · Stress. Stress can be caused by fear about a single event, such as giving a speech. Or you may have ongoing stress, such as worry about work or school. · Depression, anxiety, and other mental or emotional conditions. · Changes in your sleep habits or surroundings. This includes changes that happen where you sleep, such as noise, light, or sleeping in a different bed. It also includes changes in your sleep pattern, such as having jet lag or working a late shift. · Health problems, such as pain, breathing problems, and restless legs syndrome.   · Lack of regular exercise. How can you help yourself? Here are some tips that may help you sleep more soundly and wake up feeling more refreshed. Your sleeping area  · Use your bedroom only for sleeping and sex. A bit of light reading may help you fall asleep. But if it doesn't, do your reading elsewhere in the house. Don't watch TV in bed. · Be sure your bed is big enough to stretch out comfortably, especially if you have a sleep partner. · Keep your bedroom quiet, dark, and cool. Use curtains, blinds, or a sleep mask to block out light. To block out noise, use earplugs, soothing music, or a \"white noise\" machine. Your evening and bedtime routine  · Create a relaxing bedtime routine. You might want to take a warm shower or bath, listen to soothing music, or drink a cup of noncaffeinated tea. · Go to bed at the same time every night. And get up at the same time every morning, even if you feel tired. What to avoid  · Limit caffeine (coffee, tea, caffeinated sodas) during the day, and don't have any for at least 4 to 6 hours before bedtime. · Don't drink alcohol before bedtime. Alcohol can cause you to wake up more often during the night. · Don't smoke or use tobacco, especially in the evening. Nicotine can keep you awake. · Don't take naps during the day, especially close to bedtime. · Don't lie in bed awake for too long. If you can't fall asleep, or if you wake up in the middle of the night and can't get back to sleep within 15 minutes or so, get out of bed and go to another room until you feel sleepy. · Don't take medicine right before bed that may keep you awake or make you feel hyper or energized. Your doctor can tell you if your medicine may do this and if you can take it earlier in the day. If you can't sleep  · Imagine yourself in a peaceful, pleasant scene. Focus on the details and feelings of being in a place that is relaxing.   · Get up and do a quiet or boring activity until you feel sleepy. · Don't drink any liquids after 6 p.m. if you wake up often because you have to go to the bathroom. Where can you learn more? Go to http://mateo-claudia.info/. Enter T216 in the search box to learn more about \"Learning About Sleeping Well. \"  Current as of: May 12, 2017  Content Version: 11.4  © 0248-5246 [employer]. Care instructions adapted under license by [employer] (which disclaims liability or warranty for this information). If you have questions about a medical condition or this instruction, always ask your healthcare professional. [place] [address] any warranty or liability for your use of this information.

## 2018-05-22 ENCOUNTER — OFFICE VISIT (OUTPATIENT)
Dept: FAMILY MEDICINE CLINIC | Age: 33
End: 2018-05-22

## 2018-05-22 VITALS
HEART RATE: 72 BPM | SYSTOLIC BLOOD PRESSURE: 132 MMHG | DIASTOLIC BLOOD PRESSURE: 80 MMHG | HEIGHT: 68 IN | WEIGHT: 259.2 LBS | BODY MASS INDEX: 39.28 KG/M2 | RESPIRATION RATE: 18 BRPM

## 2018-05-22 DIAGNOSIS — E78.41 ELEVATED LP(A): ICD-10-CM

## 2018-05-22 DIAGNOSIS — K21.9 GASTROESOPHAGEAL REFLUX DISEASE WITHOUT ESOPHAGITIS: ICD-10-CM

## 2018-05-22 DIAGNOSIS — E79.0 ELEVATED URIC ACID IN BLOOD: ICD-10-CM

## 2018-05-22 DIAGNOSIS — R73.03 PREDIABETES: ICD-10-CM

## 2018-05-22 DIAGNOSIS — E78.5 HYPERLIPIDEMIA, UNSPECIFIED HYPERLIPIDEMIA TYPE: ICD-10-CM

## 2018-05-22 DIAGNOSIS — E66.01 OBESITY, MORBID (HCC): Primary | ICD-10-CM

## 2018-05-22 PROBLEM — F32.A DEPRESSION: Status: ACTIVE | Noted: 2018-05-22

## 2018-05-22 NOTE — PROGRESS NOTES
[facility] Weight Loss Program Progress Note: Initial Physician Visit     Anni Varma is a 27 y.o. female who is here for medical screening for entering the [facility] Weight Loss Program.     CC: Morbid Obesity      Weight History  I personally reviewed the Medical Screening Loyce Achilles completed by patient and scanned into media section of chart. It includes duration of their obesity, maximum weight, goal weight and weight gain time line (timing), all of which give the context of their obesity AND a Family History of their obesity. Is their Weight Loss Goal entered in to Comments? Yes  Goal wt 200 lb  Saw Wt loss program on screen saver at work      Weight loss History  I personally reviewed the [place] completed by the patient and scanned into media section of chart. It includes number of weight loss attempts, the weight loss program that patients was most successful using, and if they have any hx of anorectic medication use, including OTC supplements. This captures modifying factors. Significant Medical History  Past Medical History:   Diagnosis Date    Anxiety     Asthma     Environmental allergies     GERD (gastroesophageal reflux disease)     HLD (hyperlipidemia)     HSV-2 infection     PCOS (polycystic ovarian syndrome)     Prediabetes     Vitamin D deficiency        I personally reviewed the Medical Screening Loyce Achilles completed by the patient and scanned into media section of chart. This allows me to assess associated symptoms that are significant in the assessment of the patient's obesity and the patient's Past Medical History.       Significant Psychosocial History   Has a doctor every diagnosed with Binge Eating Disorder, Bulemia or Anorexia? : no     Compliance  Upcoming Travel? no    Social History  Social History   Substance Use Topics    Smoking status: Never Smoker    Smokeless tobacco: Never Used    Alcohol use Yes      Comment: 1 glass of wine a month       Exercise  I personally reviewed the Medical Screening Bernardine Dinora completed by the patient and scanned into media section of chart. Review of Systems  See HPI        Objective  Visit Vitals    /80    Pulse 72    Resp 18    Ht 5' 8\" (1.727 m)    Wt 259 lb 3.2 oz (117.6 kg)    BMI 39.41 kg/m2         Weight Metrics 5/22/2018 5/22/2018 5/7/2018 4/19/2018 4/10/2018 3/12/2018   Weight - 259 lb 3.2 oz 263 lb 3.2 oz 269 lb 268 lb 266 lb   Waist Measure Inches 39.25 - - - - -   Body Fat % 43 - - - - -   BMI - 39.41 kg/m2 40.02 kg/m2 40.9 kg/m2 40.75 kg/m2 40.45 kg/m2       Labs: See [facility] labs scanned into Media section       Physical Exam    Vital Signs Reviewed  Weight Management Metrics Reviewed    Appearance: Morbidy Obese  HEENT:  Scleral icterus?  no  Neck:  Thyromegaly or nodules? no  Mouth: Large tongue no  Heart:  RRR  Lungs:  LTCA  Abdomen:     Hepatomegaly? no   Striae present? no  Skin:    Acne?  no   Hirsutism? no   Skin tags? yes   Acanthosis Nigricans? yes  Ext:  Edema? no      Assessment & Plan  Encounter Diagnoses   Name Primary?  Obesity, morbid ([address] Yes    Gastroesophageal reflux disease without esophagitis     Prediabetes     Elevated uric acid in blood     Hyperlipidemia, unspecified hyperlipidemia type     Elevated Lp(a)        1. Labs reviewed w/ patient    2. EKG reviewed w/ patient:   Personally reviewed by me, NSR, No abnormalities,    QTc = 382 sec (Upper limit is 440 for males & 460 for females)    3. Medication changes include: None    4. Based on his history, labs and EKG, Bruce Fraga is now enrolled for the [facility] Loss Program.     5.  Individualized Patient Plan is as follows:   Diet Regimen: 4 Meal Replacements daily.    Weigh in: weekly at [facility]   BP f/up plan: weekly at [facility]       25 min of the 45 minutes face to face time with Binta consisted of counseling & coordinating and/or discussing treatment plans in reference to her above mentioned diagnoses.

## 2018-05-22 NOTE — PATIENT INSTRUCTIONS
Homework for [employer]        Exercise    Exercise daily   - Start slow, gradually increase your time by around 10 to 20 % a week    - To prevent injury, take a recovery week every 4 weeks (reduce your exercise time and intensity by 1/2 during this time)    - Your goal is to work up to 150 min a week; hard enough that you can't whistle or sing. It may take 6 months to work up to this. - Call the Health  at [facility] for exercise ideas ([phone])          Common Side Effects   (In order of how common)    -Fatigue  -Hunger  -Constipation  -Low sodium  -Hair Loss      1. Fatigue  Everyone goes through this stage  It's normal  It lasts 10 - 14 days  It goes away  It's your body adjusting to the Ketogenic diet and it's normal       2. Hunger  More common in the first few days  After your body adjusts to the Ketogenic diet it will go away       3. Constipation   -Drink at least 64 oz of non-calorie fluid a day  -Add fiber (at least 20 grams a day)       1. Get the New Direction products with fiber added     2. Use SUGAR-FREE products: Fiber One products, Benefiber or Metamucil    If you're prone to constipation: Take a Stool Softener every day.     (eg - Dulcolax Stool Softener 100 mg or Miralax)    If you get constipated:     1. Start with a Stool Softener (produces a bowel movement in 72 hr):   Examples:      Miralax 1 capful twice a day (It's OK to go up to 3 caps for a few days)      Dulcolax Stool Softener 100 mg       2. Next: If you still have constipation after 2 or 3 days, add a Stimulant          Laxative (this will produce a bowel movement in 6 - 12 hr):   Examples:      Exlax (1 small square) for up to 4 days      Dulcolax stimulant laxative (8.25 mg)       Magnesium citrate pill 500 mg 3 - 4 pills a day       3. Or you can go straight to a combination Stool Softener / Stimulant at night. If  you need, you can add a morning dose.    Examples:      Pericolace 50 mg / 8.25 mg      Senokot-S  50 mg / 8.25 mg       4. Finally If You're Really locked up, get Liquid Magnesium Citrate (take  according to the directions)      4. Low blood levels of sodium  -Not very common, especially if you're not taking a diuretic (fluid pill)  If you develop a headache, feel fatigued, light-headed or dizzy    Drink 1/2 cup of bouillon broth up to twice a day until you feel normal      5. Hair loss  -This is fairly uncommon; you may see more than a usual amount of hair in your brush or the shower drain  This can happen with any physical stress (surgery, trauma or calorie restriction) or psychological stress. Although is it very concerning, it is often temporary and will resolve within 3 months. Some people notice a benefit from taking a daily dose of Biotin 2,500 mcg and increasing their Fish Oil intake. Books to [address] [place] [address]    1. Change Anything: [employer] of Personal Success  by Shay Ledezma, Shiloh Walter, and Caron Garcia    2. Mindless Eating  by Marina Pedraza    3. Perfectly Yourself  by Davon Reis    4. Me, Myself and I - 28 Days to Creative Self-Love  by Olivia Nyla version only    Note: Reading these books is a small but significant investment in yourself. Merely following the diet will reliably result in weight loss. However, revising how you respond to stressful situations, how you relate to food and, your commitment to self-care (adequate sleep, exercise & daily reflection) is the ONLY way to protect your weight loss and free yourself from your patterns that lead to weight gain. Compliance    We do not expect perfection but we do ask for your persistence. Your success is directly corolleated to your willingness to do the work of growing yourself. You will hit plateaus in your weight loss. You will run into situations that test your will power. You will encounter times when you feel frustrated.     It's OK if you slip up from time to time. However, how your respond to your slip ups and, the adjustments you make to prevent future slip ups will determine you long-term success. If you find yourself temporarily slipping in the program, it's OK. We will gently nudge you forward and encourage you to do the work of identifying and moving beyond your stuck areas. However, if you find yourself wavering in the program for a prolonged time (more than 3 or 4 months) we will ask that you take a break from the program and work with a counselor to do some focused work in order to identify and break the patterns that are holding you back. Once you and your counselor is convinced that you have moved past those patterns and want to give the program another chance, we will gladly work with you to determine if you're ready to start back in the program.    When returning after a break, if it's been less than 3 months, you do not need to repeat an orientation, labs or an EKG. If it's over been 3 months you will required to repeat an orientation and, if greater than 6 months, you will be required to repeat an orientation, labs and an EKG. Additional Tips    - Consume your 4 daily meal replacements equally spaced over the    day   No more than 6 hours between each meal   Breakfast is especially important    - Get the support of family and friends    - Snack-proof your house    - Have strategies for social situations, meetings that run over or vacation      - When you fall off the plan it's no big deal; just start right back. Turn those days into examples of what to change or avoid next time. Long-term Healthy Weight / Body Fat  Different ways to determining your ideal weight:  1. Keep your waist to less than 1/2 of your height   2. Keep your % body fat to under 30% for female and under 20% if male  3. Keep your BMI around 25          Supplements      1.   Vitacost Synergy Basic Multi-Vitamin (Their In-House Brand)      Take 1 capsule twice a day        Found ONLY at [facility], NOT at [facility], [facility] [employer], [employer], the [employer], etc      SKU #: [id number]       $16.49   / 1 month supply      www. [employer]  [phone]       2. N-Acetyl Cysteine (NAC) -- 600 mg       1 pill once a day       Found at many stores but [facility] has a good price:       Item #: [id number]  $9.99 / 120 pills (4 month supply)       www. [employer]  [phone]      3. Turmeric with Black Pepper Extract (or Bioperine) 500 mg      1 pill 2 - 4 times a day (more is joint pain or increased inflammation)      Found at many stores but [employer] has a good price:      SKU #: [id number]  $13.64 / 60 pills      www. [employer]  [phone]       4. Probiotic: 15-35 (35 billion CFU)         1 capsule twice a day for 2 weeks, then 3 days a week       SKU #: [id number]  $27.99 / 120 capsules       [employer] [employer]  [phone]       5. Fish Oil      Take 1 capsule daily                             FYI:   Typical fish oil per pill =  mg and  mg  Krill oil per pill = EPA 50 - 70 mg and DHA 27 - 250 mg    6. Other things to help with will power      -[facility] [employer] [facility] [place] you with self acceptance           Put 4 drops in 10 oz water or a meal replacement 2 - 4 times a day         Around $15 a vial which lasts ~3 months         www. [employer]  [phone]                Drink Filtered Water    My favorite water filter (adds minerals to your water and cheaper than Deepika)    pH REFRESH Alkaline Water Pitcher Ionizer With 2 Long-Life Filters - Alkaline  Machine - Ionized Water Alkalizer Filter, 84oz, 2.5L (2017 Model) (White)   Sold on [employer] w/ Free Shipping        ^^^^^^^^^^^^^^^^^^^^^^^^^^^^^^^^^^^^^^^^^^^^^^^^^^^^^^^^^^^^^^^^^^^^^^^^^^^^^^^^^^^^^^^^^^^^^^^      Carbohydrates     If you do not metabolize carbohydrates well, you will lose weight and keep the weigh off by keeping your carbohydrate intake low.  How do you know if you do not metabolize carbs well? If your Triglycerides, sdLCL-C and Insulin Resistance are elevated, then you will do well to follow a low carb diet. Fun Facts About Carbs    - The Typical American Diet = 450 grams a day    - The Reducing Phase of the VLCD = 40 grams a day    - Target for weight loss maintenance:   - Eat no more than 30 grams per meal   - Eat no more than 10 grams per snack (mid-morning and mid-afternoon snack)        Resources for finding out where carbs hide in our foods:  1. Our Registered Dietitians    2. Www. Atkins. com/tools    3. Read food labels    4. Books       - The Calorie Carl Scarce       - The Mikhail System Diet for a New You       - Moira Mishra's NEW Carb and Calorie 4th Edition (my favorite)    5. Smart phone and Internet-based apps       - Procurify        - Carb Manager      If you want to get a better picture of your cardiac risk, consider getting a coronary calcium score:  Call [phone] to schedule one. Body Mass Index: Care Instructions  Your Care Instructions    Body mass index (BMI) can help you see if your weight is raising your risk for health problems. It uses a formula to compare how much you weigh with how tall you are. · A BMI lower than 18.5 is considered underweight. · A BMI between 18.5 and 24.9 is considered healthy. · A BMI between 25 and 29.9 is considered overweight. A BMI of 30 or higher is considered obese. If your BMI is in the normal range, it means that you have a lower risk for weight-related health problems. If your BMI is in the overweight or obese range, you may be at increased risk for weight-related health problems, such as high blood pressure, heart disease, stroke, arthritis or joint pain, and diabetes.  If your BMI is in the underweight range, you may be at increased risk for health problems such as fatigue, lower protection (immunity) against illness, muscle loss, bone loss, hair loss, and hormone problems. BMI is just one measure of your risk for weight-related health problems. You may be at higher risk for health problems if you are not active, you eat an unhealthy diet, or you drink too much alcohol or use tobacco products. Follow-up care is a key part of your treatment and safety. Be sure to make and go to all appointments, and call your doctor if you are having problems. It's also a good idea to know your test results and keep a list of the medicines you take. How can you care for yourself at home? · Practice healthy eating habits. This includes eating plenty of fruits, vegetables, whole grains, lean protein, and low-fat dairy. · If your doctor recommends it, get more exercise. Walking is a good choice. Bit by bit, increase the amount you walk every day. Try for at least 30 minutes on most days of the week. · Do not smoke. Smoking can increase your risk for health problems. If you need help quitting, talk to your doctor about stop-smoking programs and medicines. These can increase your chances of quitting for good. · Limit alcohol to 2 drinks a day for men and 1 drink a day for women. Too much alcohol can cause health problems. If you have a BMI higher than 25  · Your doctor may do other tests to check your risk for weight-related health problems. This may include measuring the distance around your waist. A waist measurement of more than 40 inches in men or 35 inches in women can increase the risk of weight-related health problems. · Talk with your doctor about steps you can take to stay healthy or improve your health. You may need to make lifestyle changes to lose weight and stay healthy, such as changing your diet and getting regular exercise. If you have a BMI lower than 18.5  · Your doctor may do other tests to check your risk for health problems. · Talk with your doctor about steps you can take to stay healthy or improve your health.  You may need to make lifestyle changes to gain or maintain weight and stay healthy, such as getting more healthy foods in your diet and doing exercises to build muscle. Where can you learn more? Go to http://mateo-claudia.info/. Enter S176 in the search box to learn more about \"Body Mass Index: Care Instructions. \"  Current as of: October 13, 2016  Content Version: 11.4  © 2446-4244 [employer]. Care instructions adapted under license by [employer] (which disclaims liability or warranty for this information). If you have questions about a medical condition or this instruction, always ask your healthcare professional. [place] [address] any warranty or liability for your use of this information.

## 2018-05-22 NOTE — MR AVS SNAPSHOT
Cinthia Pinzon [place] [address] [facility]. [address] [id number] 
[phone] Patient: Rosario Allison MRN: [id number] WJT:[id number] Visit Information Date & Time Provider Department Dept. Phone Encounter #  
 5/22/2018  2:45 PM Gurdeep Morris [id number] Follow-up Instructions Return in about 4 weeks (around 6/19/2018) for MS[facility] & Lab Draw. Routing History Your Appointments 6/7/2018  9:30 AM  
Follow Up with MD Edward Hernandez 23 ([facility]) Appt Note: Follow up for sleep and mood [facility]. [address]  
  
   
 [address] Upcoming Health Maintenance Date Due Pneumococcal 19-64 Medium Risk (1 of 1 - PPSV23) 8/31/2002 DTaP/Tdap/Td series (1 - Tdap) 8/31/2004 Influenza Age 5 to Adult 8/1/2018 PAP AKA CERVICAL CYTOLOGY 4/19/2021 Allergies as of 5/22/2018  Review Complete On: 5/22/2018 By: Pauline Biggs DO Severity Noted Reaction Type Reactions Latex  03/12/2018    Atopic Dermatitis Nubain [Nalbuphine] Medium 03/12/2018    Hives Sulfa (Sulfonamide Antibiotics) Medium 03/12/2018    Hives Shellfish Derived  03/12/2018    Hives Current Immunizations  Never Reviewed Name Date Influenza Nasal Vaccine (Quad) 10/12/2017 Not reviewed this visit You Were Diagnosed With   
  
 Codes Comments Obesity, morbid ([facility] [address]    -  Primary ICD-10-CM: E66.01 
ICD-9-CM: 278.01 Gastroesophageal reflux disease without esophagitis     ICD-10-CM: K21.9 ICD-9-CM: 530.81 Prediabetes     ICD-10-CM: R73.03 
ICD-9-CM: 790.29 Elevated uric acid in blood     ICD-10-CM: E79.0 ICD-9-CM: 790.6 Hyperlipidemia, unspecified hyperlipidemia type     ICD-10-CM: E78.5 ICD-9-CM: 272.4 Elevated Lp(a)     ICD-10-CM: S75.26 ICD-9-CM: 272.8 Vitals BP Pulse Resp Height(growth percentile) Weight(growth percentile) BMI  
 132/80 72 18 5' 8\" (1.727 m) 259 lb 3.2 oz (117.6 kg) 39.41 kg/m2 OB Status Smoking Status Polycystic Ovarian Syndrome Never Smoker Vitals History BMI and BSA Data Body Mass Index Body Surface Area  
 39.41 kg/m 2 2.38 m 2 Preferred Pharmacy Pharmacy Name Phone [facility] [address] [facility] [phone] Your Updated Medication List  
  
   
This list is accurate as of 5/22/18  3:42 PM.  Always use your most recent med list.  
  
  
  
  
 albuterol sulfate 90 mcg/actuation Aepb Commonly known as:  Arleta Martyr Take 2 Puffs by inhalation every four (4) hours as needed. BREO ELLIPTA 200-25 mcg/dose inhaler Generic drug:  fluticasone-vilanterol Take 1 Puff by inhalation daily. buPROPion  mg SR tablet Commonly known as:  Ulyess Bowen Take 1 Tab by mouth two (2) times a day. Start with 150 mg once daily in the morning; increase to 150 mg twice daily after 3 days. cholecalciferol 1,000 unit tablet Commonly known as:  VITAMIN D3 Take 1 Tab by mouth daily. doxepin 10 mg capsule Commonly known as:  SINEquan Take 1 Cap by mouth nightly. EPIPEN 2-BOB 0.3 mg/0.3 mL injection Generic drug:  EPINEPHrine INJECT 1 PEN INTO LATERAL THIGH FOR SYMPTOMS OF ANAPHYLAXIS. FLOVENT  mcg/actuation inhaler Generic drug:  fluticasone Take 2 Puffs by inhalation daily. metFORMIN 500 mg Tg24 24 hour tablet Commonly known asAdalberto Bodo ER Take 500 mg by mouth two (2) times a day. Indications: Polycystic Ovarian Syndrome PEPCID 20 mg tablet Generic drug:  famotidine Take 20 mg by mouth as needed. SINGULAIR 10 mg tablet Generic drug:  montelukast  
Take 10 mg by mouth daily. VALTREX 1 gram tablet Generic drug:  valACYclovir Take  by mouth daily. ZyrTEC 10 mg tablet Generic drug:  cetirizine Take  by mouth. We Performed the Following AMB POC EKG ROUTINE W/ 12 LEADS, INTER & REP [03537 CPT(R)] Follow-up Instructions Return in about 4 weeks (around 6/19/2018) for [facility] & Lab Draw. Patient Instructions Homework for [employer] 
 
 
 
Exercise Exercise daily  
- Start slow, gradually increase your time by around 10 to 20 % a week - To prevent injury, take a recovery week every 4 weeks (reduce your exercise time and intensity by 1/2 during this time) - Your goal is to work up to 150 min a week; hard enough that you can't whistle or sing. It may take 6 months to work up to this. - Call the Health  at [facility] labs for exercise ideas ([phone]) Common Side Effects (In order of how common) -Fatigue 
-Hunger 
-Constipation 
-Low sodium 
-Hair Loss 1. Fatigue Everyone goes through this stage It's normal 
It lasts 10 - 14 days It goes away It's your body adjusting to the Ketogenic diet and it's normal  
 
 
2. Hunger More common in the first few days After your body adjusts to the Ketogenic diet it will go away 3. Constipation  
-Drink at least 64 oz of non-calorie fluid a day 
-Add fiber (at least 20 grams a day) 1. Get the New Direction products with fiber added 2. Use SUGAR-FREE products: Fiber One products, Benefiber or Metamucil If you're prone to constipation: Take a Stool Softener every day. 
   (eg - Dulcolax Stool Softener 100 mg or Miralax) If you get constipated: 1. Start with a Stool Softener (produces a bowel movement in 72 hr): 
 Examples: 
    Miralax 1 capful twice a day (It's OK to go up to 3 caps for a few days) Dulcolax Stool Softener 100 mg 2. Next: If you still have constipation after 2 or 3 days, add a Stimulant Laxative (this will produce a bowel movement in 6 - 12 hr): 
 Examples: Exlax (1 small square) for up to 4 days Dulcolax stimulant laxative (8.25 mg) Magnesium citrate pill 500 mg 3 - 4 pills a day 3. Or you can go straight to a combination Stool Softener / Stimulant at night. If  you need, you can add a morning dose. Examples: 
    Pericolace 50 mg / 8.25 mg Senokot-S  50 mg / 8.25 mg 
 
   4. Finally If You're Really locked up, get Liquid Magnesium Citrate (take  according to the directions) 4. Low blood levels of sodium 
-Not very common, especially if you're not taking a diuretic (fluid pill) If you develop a headache, feel fatigued, light-headed or dizzy Drink 1/2 cup of bouillon broth up to twice a day until you feel normal 
 
 
5. Hair loss 
-This is fairly uncommon; you may see more than a usual amount of hair in your brush or the shower drain This can happen with any physical stress (surgery, trauma or calorie restriction) or psychological stress. Although is it very concerning, it is often temporary and will resolve within 3 months. Some people notice a benefit from taking a daily dose of Biotin 2,500 mcg and increasing their Fish Oil intake. Books to [address] [place] [address] 1. Change Anything: The [employer] of Personal Success 
by Elise Dexter, Barbi Smith, and Ole Key 2. Mindless Eating 
by Vira Montague 3. Perfectly Yourself 
by Hola Wells 4. Me, Myself and I - 28 Days to Creative Self-Love 
by Yandy sosa only Note: Reading these books is a small but significant investment in yourself. Merely following the diet will reliably result in weight loss. However, revising how you respond to stressful situations, how you relate to food and, your commitment to self-care (adequate sleep, exercise & daily reflection) is the ONLY way to protect your weight loss and free yourself from your patterns that lead to weight gain. Compliance We do not expect perfection but we do ask for your persistence. Your success is directly corolleated to your willingness to do the work of growing yourself. You will hit plateaus in your weight loss. You will run into situations that test your will power. You will encounter times when you feel frustrated. It's OK if you slip up from time to time. However, how your respond to your slip ups and, the adjustments you make to prevent future slip ups will determine you long-term success. If you find yourself temporarily slipping in the program, it's OK. We will gently nudge you forward and encourage you to do the work of identifying and moving beyond your stuck areas. However, if you find yourself wavering in the program for a prolonged time (more than 3 or 4 months) we will ask that you take a break from the program and work with a counselor to do some focused work in order to identify and break the patterns that are holding you back. Once you and your counselor is convinced that you have moved past those patterns and want to give the program another chance, we will gladly work with you to determine if you're ready to start back in the program. 
 
When returning after a break, if it's been less than 3 months, you do not need to repeat an orientation, labs or an EKG. If it's over been 3 months you will required to repeat an orientation and, if greater than 6 months, you will be required to repeat an orientation, labs and an EKG. Additional Tips 
 
- Consume your 4 daily meal replacements equally spaced over the    day No more than 6 hours between each meal 
 Breakfast is especially important - Get the support of family and friends 
 
- Snack-proof your house - Have strategies for social situations, meetings that run over or vacation - When you fall off the plan it's no big deal; just start right back. Turn those days into examples of what to change or avoid next time. Long-term Healthy Weight / Body Fat Different ways to determining your ideal weight: 
1. Keep your waist to less than 1/2 of your height 2. Keep your % body fat to under 30% for female and under 20% if male 3. Keep your BMI around 25 Supplements 1. Vitacost Synergy Basic Multi-Vitamin (Their In-House Brand) Take 1 capsule twice a day Found ONLY at [facility], NOT at [facility], [facility], the Vitamin Shoppe, etc 
    SKU #: [id number]  
    $16.49   / 1 month supply 
    www. LOG607  [phone]  
 
 
2. N-Acetyl Cysteine (NAC) -- 600 mg 
     1 pill once a day Found at many stores but [address] has a good price: 
     Item #: [id number]  $9.99 / 120 pills (4 month supply) 
     www. LOG607  [phone] 3. Turmeric with Black Pepper Extract (or Bioperine) 500 mg 
    1 pill 2 - 4 times a day (more is joint pain or increased inflammation) Found at many stores but [address] has a good price: 
    SKU #: [id number]  $13.64 / 61 pills 
    www. LOG607  [phone] 4. Probiotic: 15-35 (35 billion CFU) 1 capsule twice a day for 2 weeks, then 3 days a week SKU #: [id number]  $27.99 / 120 capsules 
     www. LOG607  [phone] 5. Fish Oil Take 1 capsule daily FYI:  
Typical fish oil per pill =  mg and  mg 
Krill oil per pill = EPA 50 - 70 mg and DHA 27 - 250 mg 
 
6. Other things to help with will power -Emmie Phalen Remedies Crab Apple - Helps you with self acceptance Put 4 drops in 10 oz water or a meal replacement 2 - 4 times a day Around $15 a vial which lasts ~3 months 
       www. LOG607  [phone] Drink Filtered Water My favorite water filter (adds minerals to your water and cheaper than Deepika) pH REFRESH Alkaline Water Pitcher Ionizer With 2 Long-Life Filters - Alkaline  Machine - Ionized Water Alkalizer Filter, 84oz, 2.5L (2017 Model) Leon Se[place]aside) Sold on [employer] w/ Free Shipping 
 
 
 
^^^^^^^^^^^^^^^^^^^^^^^^^^^^^^^^^^^^^^^^^^^^^^^^^^^^^^^^^^^^^^^^^^^^^^^^^^^^^^^^^^^^^^^^^^^^^^^ Carbohydrates If you do not metabolize carbohydrates well, you will lose weight and keep the weigh off by keeping your carbohydrate intake low. How do you know if you do not metabolize carbs well? If your Triglycerides, sdLCL-C and Insulin Resistance are elevated, then you will do well to follow a low carb diet. Fun Facts About Carbs - The Typical American Diet = 450 grams a day - The Reducing Phase of the VLCD = 40 grams a day - Target for weight loss maintenance: 
 - Eat no more than 30 grams per meal 
 - Eat no more than 10 grams per snack (mid-morning and mid-afternoon snack) Resources for finding out where carbs hide in our foods: 
1. Our Registered Dietitians 2. Www. Atkins. com/tools 3. Read food labels 4. Books - The Calorie Vickii Grater - The New Atkins Diet for a New You 
     - Moira Mishra's NEW Carb and Calorie 4th Edition (my favorite) 5. Smart phone and Internet-based apps - MySalescampPal  
     - Carb Manager If you want to get a better picture of your cardiac risk, consider getting a coronary calcium score:  Call [phone] to schedule one. Body Mass Index: Care Instructions Your Care Instructions Body mass index (BMI) can help you see if your weight is raising your risk for health problems. It uses a formula to compare how much you weigh with how tall you are. · A BMI lower than 18.5 is considered underweight. · A BMI between 18.5 and 24.9 is considered healthy. · A BMI between 25 and 29.9 is considered overweight. A BMI of 30 or higher is considered obese. If your BMI is in the normal range, it means that you have a lower risk for weight-related health problems. If your BMI is in the overweight or obese range, you may be at increased risk for weight-related health problems, such as high blood pressure, heart disease, stroke, arthritis or joint pain, and diabetes. If your BMI is in the underweight range, you may be at increased risk for health problems such as fatigue, lower protection (immunity) against illness, muscle loss, bone loss, hair loss, and hormone problems. BMI is just one measure of your risk for weight-related health problems. You may be at higher risk for health problems if you are not active, you eat an unhealthy diet, or you drink too much alcohol or use tobacco products. Follow-up care is a key part of your treatment and safety. Be sure to make and go to all appointments, and call your doctor if you are having problems. It's also a good idea to know your test results and keep a list of the medicines you take. How can you care for yourself at home? · Practice healthy eating habits. This includes eating plenty of fruits, vegetables, whole grains, lean protein, and low-fat dairy. · If your doctor recommends it, get more exercise. Walking is a good choice. Bit by bit, increase the amount you walk every day. Try for at least 30 minutes on most days of the week. · Do not smoke. Smoking can increase your risk for health problems. If you need help quitting, talk to your doctor about stop-smoking programs and medicines. These can increase your chances of quitting for good. · Limit alcohol to 2 drinks a day for men and 1 drink a day for women. Too much alcohol can cause health problems. If you have a BMI higher than 25 · Your doctor may do other tests to check your risk for weight-related health problems.  This may include measuring the distance around your waist. A waist measurement of more than 40 inches in men or 35 inches in women can increase the risk of weight-related health problems. · Talk with your doctor about steps you can take to stay healthy or improve your health. You may need to make lifestyle changes to lose weight and stay healthy, such as changing your diet and getting regular exercise. If you have a BMI lower than 18.5 · Your doctor may do other tests to check your risk for health problems. · Talk with your doctor about steps you can take to stay healthy or improve your health. You may need to make lifestyle changes to gain or maintain weight and stay healthy, such as getting more healthy foods in your diet and doing exercises to build muscle. Where can you learn more? Go to http://mateo-claudia.info/. Enter S176 in the search box to learn more about \"Body Mass Index: Care Instructions. \" Current as of: October 13, 2016 Content Version: 11.4 © 3977-9751 [employer]. Care instructions adapted under license by [employer] (which disclaims liability or warranty for this information). If you have questions about a medical condition or this instruction, always ask your healthcare professional. [address] any warranty or liability for your use of this information. Introducing [facility]! Dear Paz Kulkarni: Thank you for requesting a E Ink account. Our records indicate that you already have an active E Ink account. You can access your account anytime at [email or web address] Did you know that you can access your hospital and ER discharge instructions at any time in E Ink? You can also review all of your test results from your hospital stay or ER visit. Additional Information If you have questions, please visit the Frequently Asked Questions section of the E Ink website at [email or web address] Remember, E Ink is NOT to be used for urgent needs. For medical emergencies, dial 911. Now available from your iPhone and Android! Please provide this summary of care documentation to your next provider. Your primary care clinician is listed as Suze Alcantara. If you have any questions after today's visit, please call [phone].

## 2018-05-29 ENCOUNTER — CLINICAL SUPPORT (OUTPATIENT)
Dept: FAMILY MEDICINE CLINIC | Age: 33
End: 2018-05-29

## 2018-05-29 VITALS
SYSTOLIC BLOOD PRESSURE: 129 MMHG | HEART RATE: 87 BPM | HEIGHT: 68 IN | BODY MASS INDEX: 38.49 KG/M2 | DIASTOLIC BLOOD PRESSURE: 80 MMHG | RESPIRATION RATE: 16 BRPM | WEIGHT: 254 LBS

## 2018-05-29 DIAGNOSIS — E66.01 MORBID OBESITY WITH BMI OF 40.0-44.9, ADULT (HCC): Primary | ICD-10-CM

## 2018-05-29 NOTE — PROGRESS NOTES
Progress Note: Weekly Education Class in the [facility]   Is there anything that you or the patient needs to let Dr Omar Smith know about? no  Over the past week, have you experienced any side-effects? Yes; constipation    Robert Juarez is a 27 y.o. female who is enrolled in [facility]    Visit Vitals    Resp 16    Ht 5' 8\" (1.727 m)    Wt 254 lb (115.2 kg)    BMI 38.62 kg/m2     Weight Metrics 5/29/2018 5/22/2018 5/22/2018 5/7/2018 4/19/2018 4/10/2018 3/12/2018   Weight 254 lb - 259 lb 3.2 oz 263 lb 3.2 oz 269 lb 268 lb 266 lb   Waist Measure Inches - 39.25 - - - - -   Body Fat % - 43 - - - - -   BMI 38.62 kg/m2 - 39.41 kg/m2 40.02 kg/m2 40.9 kg/m2 40.75 kg/m2 40.45 kg/m2         Have you received any other medical care this week? no  If yes, where and for what? Have you had any change in your medications since your last visit? no  If yes what? Did you have any problems adhering to the program last week? no  If yes, please explain:       Eating Habits Over Last Week:  Did you take in 64 oz of non-caloric fluids? no     Did you consume your 4 meal replacements each day?  yes       Physical Activity Over the Past Week:    Aerobic exercise: 0 min  Resistance exercise: 0 workouts / week

## 2018-07-12 ENCOUNTER — OFFICE VISIT (OUTPATIENT)
Dept: FAMILY MEDICINE CLINIC | Age: 33
End: 2018-07-12

## 2018-07-12 VITALS
WEIGHT: 246.2 LBS | RESPIRATION RATE: 16 BRPM | TEMPERATURE: 98.2 F | BODY MASS INDEX: 37.31 KG/M2 | HEART RATE: 67 BPM | SYSTOLIC BLOOD PRESSURE: 108 MMHG | DIASTOLIC BLOOD PRESSURE: 72 MMHG | HEIGHT: 68 IN | OXYGEN SATURATION: 99 %

## 2018-07-12 DIAGNOSIS — F41.8 DEPRESSION WITH ANXIETY: Primary | ICD-10-CM

## 2018-07-12 DIAGNOSIS — E66.09 CLASS 2 OBESITY DUE TO EXCESS CALORIES WITHOUT SERIOUS COMORBIDITY WITH BODY MASS INDEX (BMI) OF 37.0 TO 37.9 IN ADULT: ICD-10-CM

## 2018-07-12 DIAGNOSIS — E78.5 HYPERLIPIDEMIA, UNSPECIFIED HYPERLIPIDEMIA TYPE: ICD-10-CM

## 2018-07-12 DIAGNOSIS — R73.03 PREDIABETES: ICD-10-CM

## 2018-07-12 DIAGNOSIS — E55.9 VITAMIN D DEFICIENCY: ICD-10-CM

## 2018-07-12 RX ORDER — SERTRALINE HYDROCHLORIDE 50 MG/1
50 TABLET, FILM COATED ORAL DAILY
Qty: 30 TAB | Refills: 3 | Status: SHIPPED | OUTPATIENT
Start: 2018-07-12 | End: 2018-08-09 | Stop reason: DRUGHIGH

## 2018-07-12 NOTE — PROGRESS NOTES
Gurdeep Pedroza    CC: Chronic Disease Management    HPI:     Depression with anxiety:  - Pt was taking Doxepin but stopped as it made her too \"foggy\" in the morning  - Pt felt that Wellbutrin worked well initially, but depressive symptoms returned, so she stopped taking  - She says that she does not wish to be on medications for her mood, but realizes that it is helpful  - Has tried Prozac, Fluoxetine, Venlafaxine  - Reports feeling \"motion sickness\" with these medications  - Says that these medications worked initially then symptoms would return  - Has not tried Zoloft or Paxil  - Currently not in counseling, states that it is difficult for her to make appointments  - Acknowledges that counseling would be helpful        Vitamin D Deficiency:  - Reports that she took vitamin D supplementation as prescribed  - Denies any side effects or other issues with supplement        Obesity:  - Pt lost 17 lbs since last appointment (3/7/18)  - Following low calorie, low fat diet  - Trying to follow exercise recommendations  - Saw Dr. Oswaldo Martins on 5/22/18 for medically supervised weight loss program  - Pt expressed concerns about remaining in program due to cost        Prediabetes:  - Currently not on any medication  - Last hgb a1c 3/14/18  - Pt with history of PCOS (for which she is taking metformin)  - Has been trying to follow lifestyle recommendations        Hyperlipidemia:  - Currently not on any medication  - Last check 3/14/18  - Pt has been trying to follow lifestyle recommendations        ROS: Positive items marked in RED  CON: fever, chills  Cardiovascular: palpitations, CP  Resp: wheezing, sputum, hemoptysis, SOB  GI: nausea, vomiting, diarrhea  : dysuria, hematuria  Psych: depression, anxiety      Past Medical History:   Diagnosis Date    Anxiety     Asthma     Environmental allergies     GERD (gastroesophageal reflux disease)     HLD (hyperlipidemia)     HSV-2 infection     PCOS (polycystic ovarian syndrome)     Prediabetes     Vitamin D deficiency        Past Surgical History:   Procedure Laterality Date    HX BREAST LUMPECTOMY Left 2013    HX KNEE ARTHROSCOPY Right 2010    HX WISDOM TEETH EXTRACTION  2003       Family History   Problem Relation Age of Onset    Asthma Mother     GERD Mother     Diabetes Father     Sleep Apnea Father     Hypertension Father     Elevated Lipids Father     GERD Brother     Cancer Paternal Grandmother      throat 2/2 smoking       Social History     Social History    Marital status: LEGALLY      Spouse name: N/A    Number of children: N/A    Years of education: N/A     Occupational History    RN      Social History Main Topics    Smoking status: Never Smoker    Smokeless tobacco: Never Used    Alcohol use Yes      Comment: 1 glass of wine a month    Drug use: No    Sexual activity: No     Other Topics Concern    None     Social History Narrative       Allergies   Allergen Reactions    Latex Atopic Dermatitis    Nubain [Nalbuphine] Hives    Sulfa (Sulfonamide Antibiotics) Hives    Shellfish Derived Hives         Current Outpatient Prescriptions:     fluticasone (FLOVENT HFA) 220 mcg/actuation inhaler, Take 2 Puffs by inhalation daily. , Disp: , Rfl:     albuterol sulfate (PROAIR RESPICLICK) 90 mcg/actuation aepb, Take 2 Puffs by inhalation every four (4) hours as needed. , Disp: 1 Inhaler, Rfl: 12    metFORMIN (GLUMETZA ER) 500 mg TG24 24 hour tablet, Take 500 mg by mouth two (2) times a day. Indications: Polycystic Ovarian Syndrome, Disp: 120 Tab, Rfl: 1    cholecalciferol (VITAMIN D3) 1,000 unit tablet, Take 1 Tab by mouth daily. , Disp: 90 Tab, Rfl: 1    cetirizine (ZYRTEC) 10 mg tablet, Take  by mouth., Disp: , Rfl:     montelukast (SINGULAIR) 10 mg tablet, Take 10 mg by mouth daily. , Disp: , Rfl:     valACYclovir (VALTREX) 1 gram tablet, Take  by mouth daily. , Disp: , Rfl:     EPIPEN 2-BOB 0.3 mg/0.3 mL injection, INJECT 1 PEN INTO LATERAL THIGH FOR SYMPTOMS OF ANAPHYLAXIS., Disp: , Rfl: 0    doxepin (SINEQUAN) 10 mg capsule, Take 1 Cap by mouth nightly., Disp: 30 Cap, Rfl: 1    buPROPion SR (WELLBUTRIN SR) 150 mg SR tablet, Take 1 Tab by mouth two (2) times a day. Start with 150 mg once daily in the morning; increase to 150 mg twice daily after 3 days. , Disp: 60 Tab, Rfl: 1    fluticasone-vilanterol (BREO ELLIPTA) 200-25 mcg/dose inhaler, Take 1 Puff by inhalation daily. , Disp: , Rfl:     famotidine (PEPCID) 20 mg tablet, Take 20 mg by mouth as needed. , Disp: , Rfl:     Physical Exam:      /72 (BP 1 Location: Left arm, BP Patient Position: Sitting)  Pulse 67  Temp 98.2 °F (36.8 °C) (Oral)   Resp 16  Ht 5' 8\" (1.727 m)  Wt 246 lb 3.2 oz (111.7 kg)  LMP 06/15/2018 (Exact Date)  SpO2 99%  BMI 37.43 kg/m2    General: obese habitus, NAD  HENT: NCAT  Lungs: CTAB, Normal respiratory effort and rate  CV: RRR, no MRGs  ABD: soft, non-tender, non-distended, normal bowel sounds  Ext: no peripheral edema or digital cyanosis noted  Skin: normal temperature, turgor, color, and texture  Psych: alert and oriented to person, place and time, normal affect  Neuro: speech normal, moving all extremities, gait normal      Assessment/Plan     Depression and Anxiety, unchanged:  - D/C doxepin  - Will start on sertraline  - Psychiatry referral list given  - Handout on care of anxiety given  - F/u one month        Obesity, class II, improving:  - Pt no longer class III  - Advised to continue lifestyle modifications  - F/u one month        Prediabetes:  - Suspect improvement given significant weight loss  - Will check Hgb A1c  - F/u one month        Hyperlipidemia:  - Suspect improvement given significant weight loss  - Will check fasting lipid panel  - F/u one month        Vitamin D Deficiency:  - Will check vitamin D level to confirm whether repletion was successful  - F/u one month        Camila Garcia MD  7/12/2018, 9:07 AM

## 2018-07-12 NOTE — PROGRESS NOTES
Chief Complaint   Patient presents with    Follow-up     F/u on labs. 1. Have you been to the ER, urgent care clinic since your last visit? Hospitalized since your last visit? No    2. Have you seen or consulted any other health care providers outside of the [address] [place] [address] since your last visit? Include any pap smears or colon screening. No    Visit Vitals    /72 (BP 1 Location: Left arm, BP Patient Position: Sitting)    Pulse 67    Temp 98.2 °F (36.8 °C) (Oral)    Resp 16    Ht 5' 8\" (1.727 m)    Wt 246 lb 3.2 oz (111.7 kg)    SpO2 99%    BMI 37.43 kg/m2     Patient stated that her last Tdap shot was 4 or 5 years ago at [facility].

## 2018-07-12 NOTE — MR AVS SNAPSHOT
Cinthia Pinzon [place] [address] [facility] [address] [facility] 83 [address] 
[phone] Patient: Eris Hung MRN: [id number] EZF:[birth year] Visit Information Date & Time Provider Department Dept. Phone Encounter #  
 7/12/2018  8:30 AM Fabiola Whitehead, [address] [place] [address] Street [id number] Follow-up Instructions Return in about 1 month (around 8/12/2018). Upcoming Health Maintenance Date Due Pneumococcal 19-64 Medium Risk (1 of 1 - PPSV23) 8/31/2002 DTaP/Tdap/Td series (1 - Tdap) 8/31/2004 Influenza Age 5 to Adult 8/1/2018 PAP AKA CERVICAL CYTOLOGY 4/19/2021 Allergies as of 7/12/2018  Review Complete On: 7/12/2018 By: Niya Alcazar LPN Severity Noted Reaction Type Reactions Latex  03/12/2018    Atopic Dermatitis Nubain [Nalbuphine] Medium 03/12/2018    Hives Sulfa (Sulfonamide Antibiotics) Medium 03/12/2018    Hives Shellfish Derived  03/12/2018    Hives Current Immunizations  Never Reviewed Name Date Influenza Nasal Vaccine (Quad) 10/12/2017 Not reviewed this visit You Were Diagnosed With   
  
 Codes Comments Depression with anxiety    -  Primary ICD-10-CM: F41.8 ICD-9-CM: 300.4 Vitamin D deficiency     ICD-10-CM: E55.9 ICD-9-CM: 268.9 Class 2 obesity due to excess calories without serious comorbidity with body mass index (BMI) of 37.0 to 37.9 in adult     ICD-10-CM: E66.09, T53.77 ICD-9-CM: 278.00, V85.37 Hyperlipidemia, unspecified hyperlipidemia type     ICD-10-CM: E78.5 ICD-9-CM: 272.4 Prediabetes     ICD-10-CM: R73.03 
ICD-9-CM: 790.29 Vitals BP Pulse Temp Resp Height(growth percentile) Weight(growth percentile) 108/72 (BP 1 Location: Left arm, BP Patient Position: Sitting) 67 98.2 °F (36.8 °C) (Oral) 16 5' 8\" (1.727 m) 246 lb 3.2 oz (111.7 kg) LMP SpO2 BMI OB Status Smoking Status 06/15/2018 (Exact Date) 99% 37.43 kg/m2 Polycystic Ovarian Syndrome Never Smoker Vitals History BMI and BSA Data Body Mass Index Body Surface Area  
 37.43 kg/m 2 2.31 m 2 Preferred Pharmacy [facility] Name Phone [facility] [address] [phone] Your Updated Medication List  
  
   
This list is accurate as of 7/12/18  9:56 AM.  Always use your most recent med list.  
  
  
  
  
 albuterol sulfate 90 mcg/actuation Aepb Commonly known as:  Margean Germán Take 2 Puffs by inhalation every four (4) hours as needed. BREO ELLIPTA 200-25 mcg/dose inhaler Generic drug:  fluticasone-vilanterol Take 1 Puff by inhalation daily. cholecalciferol 1,000 unit tablet Commonly known as:  VITAMIN D3 Take 1 Tab by mouth daily. EPIPEN 2-BOB 0.3 mg/0.3 mL injection Generic drug:  EPINEPHrine INJECT 1 PEN INTO LATERAL THIGH FOR SYMPTOMS OF ANAPHYLAXIS. FLOVENT  mcg/actuation inhaler Generic drug:  fluticasone Take 2 Puffs by inhalation daily. metFORMIN 500 mg Tg24 24 hour tablet Commonly known asKivani Wilmer ER Take 500 mg by mouth two (2) times a day. Indications: Polycystic Ovarian Syndrome PEPCID 20 mg tablet Generic drug:  famotidine Take 20 mg by mouth as needed. sertraline 50 mg tablet Commonly known as:  ZOLOFT Take 1 Tab by mouth daily. SINGULAIR 10 mg tablet Generic drug:  montelukast  
Take 10 mg by mouth daily. VALTREX 1 gram tablet Generic drug:  valACYclovir Take  by mouth daily. ZyrTEC 10 mg tablet Generic drug:  cetirizine Take  by mouth. Prescriptions Sent to Pharmacy Refills  
 sertraline (ZOLOFT) 50 mg tablet 3 Sig: Take 1 Tab by mouth daily. Class: Normal  
 Pharmacy: [address] [place] [address] #: [phone] Route: Oral  
  
Follow-up Instructions Return in about 1 month (around 8/12/2018). To-Do List   
 07/12/2018 Lab:  HEMOGLOBIN A1C WITH EAG   
  
 07/12/2018 Lab:  LIPID PANEL   
  
 07/12/2018 Lab:  VITAMIN D, 25 HYDROXY Patient Instructions Anxiety Disorder: Care Instructions Your Care Instructions Anxiety is a normal reaction to stress. Difficult situations can cause you to have symptoms such as sweaty palms and a nervous feeling. In an anxiety disorder, the symptoms are far more severe. Constant worry, muscle tension, trouble sleeping, nausea and diarrhea, and other symptoms can make normal daily activities difficult or impossible. These symptoms may occur for no reason, and they can affect your work, school, or social life. Medicines, counseling, and self-care can all help. Follow-up care is a key part of your treatment and safety. Be sure to make and go to all appointments, and call your doctor if you are having problems. It's also a good idea to know your test results and keep a list of the medicines you take. How can you care for yourself at home? · Take medicines exactly as directed. Call your doctor if you think you are having a problem with your medicine. · Go to your counseling sessions and follow-up appointments. · Recognize and accept your anxiety. Then, when you are in a situation that makes you anxious, say to yourself, \"This is not an emergency. I feel uncomfortable, but I am not in danger. I can keep going even if I feel anxious. \" · Be kind to your body: ¨ Relieve tension with exercise or a massage. ¨ Get enough rest. 
¨ Avoid alcohol, caffeine, nicotine, and illegal drugs. They can increase your anxiety level and cause sleep problems. ¨ Learn and do relaxation techniques. See below for more about these techniques. · Engage your mind. Get out and do something you enjoy. Go to a funny movie, or take a walk or hike. Plan your day. Having too much or too little to do can make you anxious. · Keep a record of your symptoms. Discuss your fears with a good friend or family member, or join a support group for people with similar problems. Talking to others sometimes relieves stress. · Get involved in social groups, or volunteer to help others. Being alone sometimes makes things seem worse than they are. · Get at least 30 minutes of exercise on most days of the week to relieve stress. Walking is a good choice. You also may want to do other activities, such as running, swimming, cycling, or playing tennis or team sports. Relaxation techniques Do relaxation exercises 10 to 20 minutes a day. You can play soothing, relaxing music while you do them, if you wish. · Tell others in your house that you are going to do your relaxation exercises. Ask them not to disturb you. · Find a comfortable place, away from all distractions and noise. · Lie down on your back, or sit with your back straight. · Focus on your breathing. Make it slow and steady. · Breathe in through your nose. Breathe out through either your nose or mouth. · Breathe deeply, filling up the area between your navel and your rib cage. Breathe so that your belly goes up and down. · Do not hold your breath. · Breathe like this for 5 to 10 minutes. Notice the feeling of calmness throughout your whole body. As you continue to breathe slowly and deeply, relax by doing the following for another 5 to 10 minutes: · Tighten and relax each muscle group in your body. You can begin at your toes and work your way up to your head. · Imagine your muscle groups relaxing and becoming heavy. · Empty your mind of all thoughts. · Let yourself relax more and more deeply. · Become aware of the state of calmness that surrounds you. · When your relaxation time is over, you can bring yourself back to alertness by moving your fingers and toes and then your hands and feet and then stretching and moving your entire body.  Sometimes people fall asleep during relaxation, but they usually wake up shortly afterward. · Always give yourself time to return to full alertness before you drive a car or do anything that might cause an accident if you are not fully alert. Never play a relaxation tape while you drive a car. When should you call for help? Call 911 anytime you think you may need emergency care. For example, call if: 
  · You feel you cannot stop from hurting yourself or someone else.  
Charo Quill the numbers for these national suicide hotlines: 1-750-024-TALK (6-460.454.8098) and 5-666-VLADNBN (1-254.203.7952). If you or someone you know talks about suicide or feeling hopeless, get help right away. 
 Watch closely for changes in your health, and be sure to contact your doctor if: 
  · You have anxiety or fear that affects your life.  
  · You have symptoms of anxiety that are new or different from those you had before. Where can you learn more? Go to http://mateo-claudia.info/. Enter P754 in the search box to learn more about \"Anxiety Disorder: Care Instructions. \" Current as of: December 7, 2017 Content Version: 11.7 © 3977-2118 [employer], [employer]. Care instructions adapted under license by [employer] (which disclaims liability or warranty for this information). If you have questions about a medical condition or this instruction, always ask your healthcare professional. [place] [address] any warranty or liability for your use of this information. Introducing [facility] & HEALTH SERVICES! Dear Basim Hinson: Thank you for requesting a [employer] account. Our records indicate that you already have an active [employer] account. You can access your account anytime at [email or web address]. "[employer] Did you know that you can access your hospital and ER discharge instructions at any time in [employer]? You can also review all of your test results from your hospital stay or ER visit. Additional Information If you have questions, please visit the Frequently Asked Questions section of the [employer] website at [email or web address] Remember, HobbyTalk is NOT to be used for urgent needs. For medical emergencies, dial 911. Now available from your iPhone and Android! Please provide this summary of care documentation to your next provider. Your primary care clinician is listed as Brenda Chicas. If you have any questions after today's visit, please call [phone].

## 2018-07-12 NOTE — PATIENT INSTRUCTIONS

## 2018-07-14 LAB
25(OH)D3 SERPL-MCNC: 29 NG/ML (ref 30–100)
CHOL/HDL RATIO,CHHDX: 3.1 (CALC)
CHOLEST SERPL-MCNC: 199 MG/DL
EAG (MG/DL),9916804: 105 (CALC)
EAG (MMOL/L),9916805: 5.8 (CALC)
HBA1C MFR BLD HPLC: 5.3 % OF TOTAL HGB
HDLC SERPL-MCNC: 64 MG/DL
LDL-CHOLESTEROL: 119 MG/DL (CALC)
NON-HDL CHOLESTEROL, 011976: 135 MG/DL (CALC)
TRIGL SERPL-MCNC: 66 MG/DL (ref ?–150)

## 2018-07-17 DIAGNOSIS — E55.9 VITAMIN D DEFICIENCY: ICD-10-CM

## 2018-07-17 RX ORDER — MELATONIN
2000 DAILY
Qty: 180 TAB | Refills: 0 | Status: SHIPPED | OUTPATIENT
Start: 2018-07-17 | End: 2021-04-14

## 2018-08-09 ENCOUNTER — OFFICE VISIT (OUTPATIENT)
Dept: FAMILY MEDICINE CLINIC | Age: 33
End: 2018-08-09

## 2018-08-09 VITALS
OXYGEN SATURATION: 97 % | HEIGHT: 68 IN | DIASTOLIC BLOOD PRESSURE: 68 MMHG | TEMPERATURE: 98.7 F | SYSTOLIC BLOOD PRESSURE: 112 MMHG | WEIGHT: 246.2 LBS | BODY MASS INDEX: 37.31 KG/M2 | RESPIRATION RATE: 16 BRPM | HEART RATE: 72 BPM

## 2018-08-09 DIAGNOSIS — E55.9 VITAMIN D DEFICIENCY: ICD-10-CM

## 2018-08-09 DIAGNOSIS — J45.909 ASTHMA, UNSPECIFIED ASTHMA SEVERITY, UNSPECIFIED WHETHER COMPLICATED, UNSPECIFIED WHETHER PERSISTENT: ICD-10-CM

## 2018-08-09 DIAGNOSIS — B00.9 HERPES: ICD-10-CM

## 2018-08-09 DIAGNOSIS — E78.5 HYPERLIPIDEMIA, UNSPECIFIED HYPERLIPIDEMIA TYPE: ICD-10-CM

## 2018-08-09 DIAGNOSIS — F41.8 DEPRESSION WITH ANXIETY: Primary | ICD-10-CM

## 2018-08-09 RX ORDER — VALACYCLOVIR HYDROCHLORIDE 1 G/1
1000 TABLET, FILM COATED ORAL DAILY
Qty: 90 TAB | Refills: 1 | Status: SHIPPED | OUTPATIENT
Start: 2018-08-09 | End: 2021-04-24

## 2018-08-09 RX ORDER — SERTRALINE HYDROCHLORIDE 100 MG/1
100 TABLET, FILM COATED ORAL DAILY
Qty: 30 TAB | Refills: 3 | Status: SHIPPED | OUTPATIENT
Start: 2018-08-09 | End: 2018-11-17 | Stop reason: SDUPTHER

## 2018-08-09 RX ORDER — MONTELUKAST SODIUM 10 MG/1
10 TABLET ORAL DAILY
Qty: 90 TAB | Refills: 1 | Status: SHIPPED | OUTPATIENT
Start: 2018-08-09 | End: 2019-02-02 | Stop reason: SDUPTHER

## 2018-08-09 NOTE — MR AVS SNAPSHOT
Cinthia Pinzon [place] [address] [facility] [address] [facility] 83 [address] 
[phone] Patient: Lily Jacobsen MRN: [id number] AYF:9/82/1903 Visit Information Date & Time Provider Department Dept. Phone Encounter #  
 8/9/2018  8:30 AM Kathe Veras, [address] [place] [address] [phone] [id number] Follow-up Instructions Return in about 3 months (around 11/9/2018). Upcoming Health Maintenance Date Due Pneumococcal 19-64 Medium Risk (1 of 1 - PPSV23) 8/31/2002 DTaP/Tdap/Td series (1 - Tdap) 8/31/2004 Influenza Age 5 to Adult 8/1/2018 PAP AKA CERVICAL CYTOLOGY 4/19/2021 Allergies as of 8/9/2018  Review Complete On: 8/9/2018 By: Alok Mijares LPN Severity Noted Reaction Type Reactions Latex  03/12/2018    Atopic Dermatitis Nubain [Nalbuphine] Medium 03/12/2018    Hives Sulfa (Sulfonamide Antibiotics) Medium 03/12/2018    Hives Shellfish Derived  03/12/2018    Hives Current Immunizations  Never Reviewed Name Date Influenza Nasal Vaccine (Quad) 10/12/2017 Not reviewed this visit You Were Diagnosed With   
  
 Codes Comments Depression with anxiety    -  Primary ICD-10-CM: F41.8 ICD-9-CM: 300.4 Vitamin D deficiency     ICD-10-CM: E55.9 ICD-9-CM: 268.9 Elevated LDL cholesterol level     ICD-10-CM: E78.00 ICD-9-CM: 272.0 Vitals BP Pulse Temp Resp Height(growth percentile) Weight(growth percentile) 112/68 (BP 1 Location: Left arm, BP Patient Position: Sitting) 72 98.7 °F (37.1 °C) (Oral) 16 5' 8\" (1.727 m) 246 lb 3.2 oz (111.7 kg) LMP SpO2 BMI OB Status Smoking Status 07/14/2018 (Exact Date) 97% 37.43 kg/m2 Polycystic Ovarian Syndrome Never Smoker Vitals History BMI and BSA Data Body Mass Index Body Surface Area  
 37.43 kg/m 2 2.31 m 2 Preferred Pharmacy Pharmacy Name Phone  [id number] [phone] Your Updated Medication List  
  
   
This list is accurate as of 8/9/18  9:03 AM.  Always use your most recent med list.  
  
  
  
  
 albuterol sulfate 90 mcg/actuation Aepb Commonly known as:  Jacinto Ding Take 2 Puffs by inhalation every four (4) hours as needed. BREO ELLIPTA 200-25 mcg/dose inhaler Generic drug:  fluticasone-vilanterol Take 1 Puff by inhalation daily. cholecalciferol 1,000 unit tablet Commonly known as:  VITAMIN D3 Take 2 Tabs by mouth daily. EPIPEN 2-BOB 0.3 mg/0.3 mL injection Generic drug:  EPINEPHrine INJECT 1 PEN INTO LATERAL THIGH FOR SYMPTOMS OF ANAPHYLAXIS. FLOVENT  mcg/actuation inhaler Generic drug:  fluticasone Take 2 Puffs by inhalation daily. metFORMIN 500 mg Tg24 24 hour tablet Commonly known Yonny Rodríguezalbe ER Take 500 mg by mouth two (2) times a day. Indications: Polycystic Ovarian Syndrome PEPCID 20 mg tablet Generic drug:  famotidine Take 20 mg by mouth as needed. SINGULAIR 10 mg tablet Generic drug:  montelukast  
Take 10 mg by mouth daily. VALTREX 1 gram tablet Generic drug:  valACYclovir Take  by mouth daily. ZyrTEC 10 mg tablet Generic drug:  cetirizine Take  by mouth. Follow-up Instructions Return in about 3 months (around 11/9/2018). [facility] & HEALTH SERVICES! Dear Basim Hinson: Thank you for requesting a [employer] account. Our records indicate that you already have an active [employer] account. You can access your account anytime at [email or web address] Did you know that you can access your hospital and ER discharge instructions at any time in [employer]? You can also review all of your test results from your hospital stay or ER visit. Additional Information If you have questions, please visit the Frequently Asked Questions section of the [employer] website at [email or web address] Remember, MyChart is NOT to be used for urgent needs. For medical emergencies, dial 911. Now available from your iPhone and Android! Please provide this summary of care documentation to your next provider. Your primary care clinician is listed as Gordo Degroot. If you have any questions after today's visit, please call [phone].

## 2018-08-09 NOTE — PROGRESS NOTES
[facility]    CC: F/U for Chronic disease management    HPI:     HLD:  - Got requested fasting blood work  - Is exercising for 20 min in the morning 3-4 days/week  - Reports her diet is improving in general, but notes no change in weight  - Has not been counting calories      Depression/Anxiety:  - Has been taking Sertraline as prescribed  - Denies any side-effects or issues with medication  - Reports that her mood issues have been inadequately controlled with the medication  - Has not made an appointment with a mental health provider yet      Vitamin D deficiency:  - Got requested blood work  - Has been taking increased vitamin D supplement as prescribed  - Reports no side effects or issues with the supplement      ROS: Positive items marked in RED  CON: fever, chills  Cardiovascular: palpitations, CP  Resp: SOB, cough  GI: nausea, vomiting, diarrhea  : dysuria, hematuria      Past Medical History:   Diagnosis Date    Anxiety     Asthma     Environmental allergies     GERD (gastroesophageal reflux disease)     HLD (hyperlipidemia)     HSV-2 infection     PCOS (polycystic ovarian syndrome)     Prediabetes     Vitamin D deficiency        Past Surgical History:   Procedure Laterality Date    HX BREAST LUMPECTOMY Left 2013    HX KNEE ARTHROSCOPY Right 2010    HX WISDOM TEETH EXTRACTION  2003       Family History   Problem Relation Age of Onset    Asthma Mother     GERD Mother     Substance Abuse Mother     Diabetes Father     Sleep Apnea Father     Hypertension Father     Elevated Lipids Father     Alcohol abuse Father     GERD Brother     Cancer Paternal Grandmother      throat 2/2 smoking       Social History     Social History    Marital status: LEGALLY      Spouse name: N/A    Number of children: N/A    Years of education: N/A     Occupational History    RN      Social History Main Topics    Smoking status: Never Smoker    Smokeless tobacco: Never Used    Alcohol use Yes      Comment: 1 glass of wine a month    Drug use: No    Sexual activity: No     Other Topics Concern    None     Social History Narrative       Allergies   Allergen Reactions    Latex Atopic Dermatitis    Nubain [Nalbuphine] Hives    Sulfa (Sulfonamide Antibiotics) Hives    Shellfish Derived Hives         Current Outpatient Prescriptions:     cholecalciferol (VITAMIN D3) 1,000 unit tablet, Take 2 Tabs by mouth daily. , Disp: 180 Tab, Rfl: 0    sertraline (ZOLOFT) 50 mg tablet, Take 1 Tab by mouth daily. , Disp: 30 Tab, Rfl: 3    fluticasone (FLOVENT HFA) 220 mcg/actuation inhaler, Take 2 Puffs by inhalation daily. , Disp: , Rfl:     metFORMIN (GLUMETZA ER) 500 mg TG24 24 hour tablet, Take 500 mg by mouth two (2) times a day. Indications: Polycystic Ovarian Syndrome, Disp: 120 Tab, Rfl: 1    montelukast (SINGULAIR) 10 mg tablet, Take 10 mg by mouth daily. , Disp: , Rfl:     valACYclovir (VALTREX) 1 gram tablet, Take  by mouth daily. , Disp: , Rfl:     famotidine (PEPCID) 20 mg tablet, Take 20 mg by mouth as needed. , Disp: , Rfl:     EPIPEN 2-BOB 0.3 mg/0.3 mL injection, INJECT 1 PEN INTO LATERAL THIGH FOR SYMPTOMS OF ANAPHYLAXIS., Disp: , Rfl: 0    albuterol sulfate (PROAIR RESPICLICK) 90 mcg/actuation aepb, Take 2 Puffs by inhalation every four (4) hours as needed. , Disp: 1 Inhaler, Rfl: 12    cetirizine (ZYRTEC) 10 mg tablet, Take  by mouth., Disp: , Rfl:     fluticasone-vilanterol (BREO ELLIPTA) 200-25 mcg/dose inhaler, Take 1 Puff by inhalation daily. , Disp: , Rfl:     Physical Exam:      /68 (BP 1 Location: Left arm, BP Patient Position: Sitting)  Pulse 72  Temp 98.7 °F (37.1 °C) (Oral)   Resp 16  Ht 5' 8\" (1.727 m)  Wt 246 lb 3.2 oz (111.7 kg)  LMP 07/14/2018 (Exact Date)  SpO2 97%  BMI 37.43 kg/m2    General:  Obese body habitus, NAD, conversant  Eyes: sclera clear bilaterally, no discharge noted, eyelids normal in appearance  HENT: NCAT  Lungs: CTAB, normal respiratory effort and rate  CV: RRR, no MRGs  ABD: soft, non-tender, non-distended, normal bowel sounds  Ext: no peripheral edema or digital cyanosis noted  Skin: normal temperature, turgor, color, and texture  Psych: alert and oriented to person, place and time, normal affect  Neuro: speech normal, moving all extremities, gait normal    Results for Jenifer Hankins (MRN [id number]):   Ref.  Range 7/14/2018 04:39   Triglyceride Latest Ref Range: <150 mg/dL 66   Cholesterol, total Latest Ref Range: <200 mg/dL 199   HDL Cholesterol Latest Ref Range: >50 mg/dL 64   Non-HDL Cholesterol Latest Ref Range: <130 mg/dL (calc) 135 (H)   Cholesterol/HDL ratio Latest Ref Range: <5.0 (calc) 3.1   Hemoglobin A1c, (calculated) Latest Ref Range: <5.7 % of total Hgb 5.3   VITAMIN D, 25-HYDROXY Latest Ref Range: 30 - 100 ng/mL 29 (L)   eAG (mg/dL) Latest Units: (calc) 105   eAG (mmol/L) Latest Units: (calc) 5.8   LDL-CHOLESTEROL Latest Units: mg/dL (calc) 119 (H)       Assessment/Plan     HLD, Improving:  - Improvement likely secondary to lifestyle modifications  - Pt encouraged to continue following lifestyle recommendations; in particular, was advised to increase physical activity to a minimum of 150 min/week  - F/U in 3 month      Depression/Anxiety,  Inadequately Controlled:  - Will increase Sertraline dose to 100 mg  - Pt encouraged to make appointment with mental health provider  - F/U in 3 month (Advised to follow up sooner if needed)      Vitamin D deficiency, Improving:  - Vitamin D level improved but not at goal level  - Will continue with previously discussed increased Vitamin D supplementation  - Plan for repeat Vitamin D level at next visit  - F/U in 3 month        Ita Pineda MD  8/9/2018, 8:40 AM

## 2018-08-09 NOTE — PROGRESS NOTES
Chief Complaint   Patient presents with    Follow Up Chronic Condition     Visit Vitals    /68 (BP 1 Location: Left arm, BP Patient Position: Sitting)    Pulse 72    Temp 98.7 °F (37.1 °C) (Oral)    Resp 16    Ht 5' 8\" (1.727 m)    Wt 246 lb 3.2 oz (111.7 kg)    SpO2 97%    BMI 37.43 kg/m2     1. Have you been to the ER, urgent care clinic since your last visit? Hospitalized since your last visit? No    2. Have you seen or consulted any other health care providers outside of the [address] [place] [address] since your last visit? Include any pap smears or colon screening.  No

## 2018-09-10 DIAGNOSIS — J45.909 ASTHMA, UNSPECIFIED ASTHMA SEVERITY, UNSPECIFIED WHETHER COMPLICATED, UNSPECIFIED WHETHER PERSISTENT: Primary | ICD-10-CM

## 2018-09-10 NOTE — TELEPHONE ENCOUNTER
Patient has Lisa Zacarias in her list. Please find out which one is she actually taking.     Camila Garcia MD  9/10/2018

## 2018-09-11 RX ORDER — FLUTICASONE FUROATE AND VILANTEROL 200; 25 UG/1; UG/1
1 POWDER RESPIRATORY (INHALATION) DAILY
Qty: 1 INHALER | Refills: 3 | Status: SHIPPED | OUTPATIENT
Start: 2018-09-11 | End: 2019-08-22 | Stop reason: CLARIF

## 2018-09-18 DIAGNOSIS — F41.9 ANXIETY: Primary | ICD-10-CM

## 2018-09-18 RX ORDER — HYDROXYZINE 50 MG/1
50 TABLET, FILM COATED ORAL
Qty: 90 TAB | Refills: 1 | Status: SHIPPED | OUTPATIENT
Start: 2018-09-18 | End: 2019-09-13 | Stop reason: SDUPTHER

## 2018-12-06 ENCOUNTER — OFFICE VISIT (OUTPATIENT)
Dept: FAMILY MEDICINE CLINIC | Age: 33
End: 2018-12-06

## 2018-12-06 VITALS
WEIGHT: 268.6 LBS | TEMPERATURE: 98.2 F | RESPIRATION RATE: 18 BRPM | SYSTOLIC BLOOD PRESSURE: 114 MMHG | HEART RATE: 69 BPM | DIASTOLIC BLOOD PRESSURE: 83 MMHG | HEIGHT: 68 IN | BODY MASS INDEX: 40.71 KG/M2

## 2018-12-06 DIAGNOSIS — R51.9 ACUTE INTRACTABLE HEADACHE, UNSPECIFIED HEADACHE TYPE: Primary | ICD-10-CM

## 2018-12-06 RX ORDER — SUMATRIPTAN 50 MG/1
50 TABLET, FILM COATED ORAL
Qty: 9 TAB | Refills: 1 | Status: SHIPPED | OUTPATIENT
Start: 2018-12-06 | End: 2018-12-06

## 2018-12-06 NOTE — PROGRESS NOTES
1. Have you been to the ER, urgent care clinic since your last visit? Hospitalized since your last visit? No    2. Have you seen or consulted any other health care providers outside of the [address] [place] [address] since your last visit? Include any pap smears or colon screening. Yes Allergic doctor.      Chief Complaint   Patient presents with    Headache     Visit Vitals  /83   Pulse 69   Temp 98.2 °F (36.8 °C) (Oral)   Resp 18   Ht 5' 8\" (1.727 m)   Wt 268 lb 9.6 oz (121.8 kg)   BMI 40.84 kg/m²

## 2018-12-06 NOTE — PATIENT INSTRUCTIONS

## 2018-12-06 NOTE — PROGRESS NOTES
[facility]    CC: HA    HPI:     HA:  - Started on Monday (12/3/2018)  - Located behind right eye  - Described as a pulling and throbbing  - Associated with light sensitivity  - Has had tearing up of eye, but states it is due to her allergies  - Has history of headaches  - Tried Excedrin and goody powder (Has lessened pain, but not resolved it)  - Uses computer at work  - Has Flonase at home, but is not using the medication      ROS: Positive items marked in RED  CON: fever, chills, flushing, sweating  ENT: rhinorrhea  Cardiovascular: palpitations, CP  Resp: SOB, cough  GI: nausea, vomiting, diarrhea  : dysuria, hematuria      Past Medical History:   Diagnosis Date    Anxiety     Asthma     Environmental allergies     GERD (gastroesophageal reflux disease)     HLD (hyperlipidemia)     HSV-2 infection     PCOS (polycystic ovarian syndrome)     Prediabetes     Vitamin D deficiency        Past Surgical History:   Procedure Laterality Date    HX BREAST LUMPECTOMY Left 2013    HX KNEE ARTHROSCOPY Right 2010    HX WISDOM TEETH EXTRACTION  2003       Family History   Problem Relation Age of Onset    Asthma Mother     GERD Mother     Substance Abuse Mother     Diabetes Father     Sleep Apnea Father     Hypertension Father     Elevated Lipids Father     Alcohol abuse Father     GERD Brother     Cancer Paternal Grandmother         throat 2/2 smoking       Social History     Socioeconomic History    Marital status: LEGALLY      Spouse name: Not on file    Number of children: Not on file    Years of education: Not on file    Highest education level: Not on file   Occupational History    Occupation: RN   Tobacco Use    Smoking status: Never Smoker    Smokeless tobacco: Never Used   Substance and Sexual Activity    Alcohol use: Yes     Comment: 1 glass of wine a month    Drug use: No    Sexual activity: No       Allergies   Allergen Reactions    Latex Atopic Dermatitis  Nubain [Nalbuphine] Hives    Sulfa (Sulfonamide Antibiotics) Hives    Shellfish Derived Hives         Current Outpatient Medications:     sertraline (ZOLOFT) 100 mg tablet, Take 1 Tab by mouth daily. , Disp: 90 Tab, Rfl: 0    hydrOXYzine HCl (ATARAX) 50 mg tablet, Take 1 Tab by mouth three (3) times daily as needed for Anxiety. , Disp: 90 Tab, Rfl: 1    fluticasone-vilanterol (BREO ELLIPTA) 200-25 mcg/dose inhaler, Take 1 Puff by inhalation daily. Indications: MAINTENANCE THERAPY FOR ASTHMA, Disp: 1 Inhaler, Rfl: 3    montelukast (SINGULAIR) 10 mg tablet, Take 1 Tab by mouth daily. , Disp: 90 Tab, Rfl: 1    valACYclovir (VALTREX) 1 gram tablet, Take 1 Tab by mouth daily. , Disp: 90 Tab, Rfl: 1    cholecalciferol (VITAMIN D3) 1,000 unit tablet, Take 2 Tabs by mouth daily. , Disp: 180 Tab, Rfl: 0    EPIPEN 2-BOB 0.3 mg/0.3 mL injection, INJECT 1 PEN INTO LATERAL THIGH FOR SYMPTOMS OF ANAPHYLAXIS., Disp: , Rfl: 0    albuterol sulfate (PROAIR RESPICLICK) 90 mcg/actuation aepb, Take 2 Puffs by inhalation every four (4) hours as needed. , Disp: 1 Inhaler, Rfl: 12    metFORMIN (GLUMETZA ER) 500 mg TG24 24 hour tablet, Take 500 mg by mouth two (2) times a day. Indications: Polycystic Ovarian Syndrome, Disp: 120 Tab, Rfl: 1    cetirizine (ZYRTEC) 10 mg tablet, Take  by mouth., Disp: , Rfl:     famotidine (PEPCID) 20 mg tablet, Take 20 mg by mouth as needed. , Disp: , Rfl:     Physical Exam:      /83   Pulse 69   Temp 98.2 °F (36.8 °C) (Oral)   Resp 18   Ht 5' 8\" (1.727 m)   Wt 268 lb 9.6 oz (121.8 kg)   LMP 11/09/2018 (Exact Date)   BMI 40.84 kg/m²     General:  Obese habitus, NAD, conversant  Eyes: sclera clear bilaterally, no discharge noted, eyelids normal in appearance, EOMI, PERRLA  HENT: NCAT, Rt maxillary sinus tenderness, nasal turbinates enlarged bilaterally (Rt>Lt), oropharynx clear, MMM  Lungs: CTAB, normal respiratory effort and rate  CV: RRR, no MRGs  ABD: soft, non-tender, non-distended, normal bowel sounds  Skin: normal temperature, turgor, color, and texture  Psych: alert and oriented to person, place and situation, normal affect  Neuro: speech normal, moving all extremities, gait normal, 5/5 strength in upper/lower extremities, sensation and CN II-XII grossly intact      Assessment/Plan     HA:  - Suspect migraine HA with possible trigger from allergies and/or straining of vision from prolonged computer use  - DDx include sinus HA and cluster HA  - Advised to start using nasal steroid spray, given her notable rhinitis  - Will do trial on sumatriptan for abortive therapy  - Advised to keep a HA journal  - Handout given on HA care  - F/U as needed if symptoms worsen or fail to improve        Josie Holcomb MD  12/6/2018, 11:24 AM

## 2019-01-30 NOTE — TELEPHONE ENCOUNTER
Per [employer] My Meds insurance will not cover Glumetza  MG. Per insurance company they will cover glucophage  mg or medication will require a prior authorization and patient must have tried and failed 3 formulary medication before Donzella Flavin ER will be approved. Please advise.

## 2019-01-30 NOTE — TELEPHONE ENCOUNTER
Talked to patient and she stated that pharmacy is waiting for her insurance PA for the medication, but she is ok with that. I let her know that she can uses [address] too.

## 2019-02-01 DIAGNOSIS — E28.2 PCOS (POLYCYSTIC OVARIAN SYNDROME): Primary | ICD-10-CM

## 2019-02-01 RX ORDER — METFORMIN HYDROCHLORIDE 500 MG/1
500 TABLET, EXTENDED RELEASE ORAL 2 TIMES DAILY
Qty: 180 TAB | Refills: 1 | Status: SHIPPED | OUTPATIENT
Start: 2019-02-01 | End: 2019-02-04 | Stop reason: ALTCHOICE

## 2019-02-02 DIAGNOSIS — J45.909 ASTHMA, UNSPECIFIED ASTHMA SEVERITY, UNSPECIFIED WHETHER COMPLICATED, UNSPECIFIED WHETHER PERSISTENT: ICD-10-CM

## 2019-02-04 DIAGNOSIS — E28.2 PCOS (POLYCYSTIC OVARIAN SYNDROME): Primary | ICD-10-CM

## 2019-02-04 RX ORDER — METFORMIN HYDROCHLORIDE 500 MG/1
500 TABLET, FILM COATED, EXTENDED RELEASE ORAL 2 TIMES DAILY
Qty: 180 MG | Refills: 1 | Status: SHIPPED | OUTPATIENT
Start: 2019-02-04 | End: 2019-04-03 | Stop reason: SDUPTHER

## 2019-02-04 RX ORDER — MONTELUKAST SODIUM 10 MG/1
TABLET ORAL
Qty: 90 TAB | Refills: 1 | Status: SHIPPED | OUTPATIENT
Start: 2019-02-04 | End: 2019-03-20 | Stop reason: SDUPTHER

## 2019-03-18 ENCOUNTER — OFFICE VISIT (OUTPATIENT)
Dept: OBGYN CLINIC | Age: 34
End: 2019-03-18

## 2019-03-18 VITALS
HEIGHT: 68 IN | DIASTOLIC BLOOD PRESSURE: 82 MMHG | WEIGHT: 265 LBS | HEART RATE: 65 BPM | BODY MASS INDEX: 40.16 KG/M2 | SYSTOLIC BLOOD PRESSURE: 134 MMHG

## 2019-03-18 DIAGNOSIS — N97.0 INFERTILITY, ANOVULATION: ICD-10-CM

## 2019-03-18 DIAGNOSIS — Z30.09 FAMILY PLANNING: Primary | ICD-10-CM

## 2019-03-18 LAB
HCG URINE, QL. (POC): NEGATIVE
VALID INTERNAL CONTROL?: YES

## 2019-03-18 NOTE — PROGRESS NOTES
25 y/o G0 presents to the office for infertility. She states she has been with her  for 3 years and having unprotected sex every other day. She states she was diagnosed with PCOS by ultrasound in her late 19's. She was also on the pill from age 14 to 30. She is now ready to start a family. She is diagnosed with PCOS by ultrasound, 40 day cycles and excessive facial hair. She states she was put on Metformin at age 27, but hasn't taken it consistently until recently with the change to a gastro-sparing drug. Active Ambulatory Problems     Diagnosis Date Noted    Obesity, morbid ([address] 04/10/2018    PCOS (polycystic ovarian syndrome) 04/19/2018    Depression 05/22/2018    Gastroesophageal reflux disease without esophagitis 05/22/2018    Prediabetes 05/22/2018    Elevated uric acid in blood 05/22/2018    Elevated Lp(a) 05/22/2018    Hyperlipidemia 05/22/2018     Resolved Ambulatory Problems     Diagnosis Date Noted    No Resolved Ambulatory Problems     Past Medical History:   Diagnosis Date    Anxiety     Asthma     Environmental allergies     GERD (gastroesophageal reflux disease)     HLD (hyperlipidemia)     HSV-2 infection     PCOS (polycystic ovarian syndrome)     Prediabetes     Vitamin D deficiency      Visit Vitals  /82   Pulse 65   Ht 5' 8\" (1.727 m)   Wt 265 lb (120.2 kg)   LMP 02/27/2019   BMI 40.29 kg/m²     Gen:A&Ox3, NAD  Obese    Exam deferred  Recent pap smear normal    A/P  PCOS- with normal cycles and confirmed ovulation consistently Day 9 or 10. Discussed need for semen analysis and HSG after ultrasound. Will plan for ovulation enhancer if needed. The plan of care has been discussed with the patient. She has been given the opportunity to ask questions, which seem to have been answered satisfactorily. 15 minutes spent with this patient.

## 2019-03-18 NOTE — PATIENT INSTRUCTIONS
Learning About Infertility Testing  What is infertility testing? Infertility tests help find out why a woman cannot get pregnant. These tests include a physical exam, semen analysis, blood tests, and other procedures. Many of these tests are done in your doctor's office or clinic. Some other procedures may be done in a hospital.  What happens during testing? The first tests done include:  · Physical exam and medical history (both partners). · Blood or urine tests (both partners). These tests check hormones and look for sexually transmitted infections. · Semen analysis (the man). This test checks the number of sperm and how they move. If the first tests don't find a problem, the woman may have one or more of these tests:  · Ultrasound. A pelvic ultrasound looks at the size and structure of the uterus and ovaries. · X-ray. A special kind of X-ray looks at the inside of the uterus and the fallopian tubes. · Laparoscopy. The doctor puts a thin, lighted scope through a small cut in the belly to see the organs inside. If the problem is still not found, other tests and procedures may be done. What can you expect after this testing? Sometimes tests can't find the problem. And not all infertility problems can be treated. But you may still be able to get pregnant with techniques like in vitro fertilization. What else should you know? Infertility tests can cost a lot. And they can be stressful and take a lot of time. Before you have these tests, talk with your partner about them. In some cases, you may not find a problem even after many tests. So it is important to decide how many tests you might want to try. Where can you learn more? Go to http://mateo-claudia.info/. Enter Z735 in the search box to learn more about \"Learning About Infertility Testing. \"  Current as of: September 5, 2018  Content Version: 11.9  © 3553-2791 [employer].  Care instructions adapted under license by [address] (which disclaims liability or warranty for this information). If you have questions about a medical condition or this instruction, always ask your healthcare professional. [address] any warranty or liability for your use of this information.

## 2019-03-20 DIAGNOSIS — J45.909 ASTHMA, UNSPECIFIED ASTHMA SEVERITY, UNSPECIFIED WHETHER COMPLICATED, UNSPECIFIED WHETHER PERSISTENT: ICD-10-CM

## 2019-03-20 NOTE — TELEPHONE ENCOUNTER
· Last office visit was on 12/6/18. · No future appointment scheduled. · Last filled on 2/4/2019    · Qty: 90    · Refills: 1    Left VM for patient to return call regarding refill request. It appears to be too early for patient to have medication refilled.

## 2019-03-22 ENCOUNTER — HOSPITAL ENCOUNTER (OUTPATIENT)
Dept: ULTRASOUND IMAGING | Age: 34
Discharge: HOME | End: 2019-03-22
Attending: OBSTETRICS & GYNECOLOGY
Payer: COMMERCIAL

## 2019-03-22 DIAGNOSIS — N97.0 INFERTILITY, ANOVULATION: ICD-10-CM

## 2019-03-22 PROCEDURE — 76830 TRANSVAGINAL US NON-OB: CPT

## 2019-03-22 RX ORDER — MONTELUKAST SODIUM 10 MG/1
10 TABLET ORAL DAILY
Qty: 90 TAB | Refills: 1 | Status: SHIPPED | OUTPATIENT
Start: 2019-03-22 | End: 2021-04-14

## 2019-04-08 ENCOUNTER — OFFICE VISIT (OUTPATIENT)
Dept: OBGYN CLINIC | Age: 34
End: 2019-04-08

## 2019-04-08 VITALS
WEIGHT: 262 LBS | HEART RATE: 85 BPM | OXYGEN SATURATION: 97 % | DIASTOLIC BLOOD PRESSURE: 98 MMHG | SYSTOLIC BLOOD PRESSURE: 147 MMHG | RESPIRATION RATE: 12 BRPM | BODY MASS INDEX: 39.71 KG/M2 | TEMPERATURE: 97.9 F | HEIGHT: 68 IN

## 2019-04-08 DIAGNOSIS — E28.2 PCOS (POLYCYSTIC OVARIAN SYNDROME): Primary | ICD-10-CM

## 2019-04-08 RX ORDER — THERA TABS 400 MCG
1 TAB ORAL
COMMUNITY
End: 2021-09-13

## 2019-04-08 NOTE — PROGRESS NOTES
Vineet Mcdermott is a 26 y.o. female (: 1985) presenting to address:    Chief Complaint   Patient presents with    Results     US       Vitals:    19 1522   BP: (!) 147/98   Pulse: 85   Resp: 12   Temp: 97.9 °F (36.6 °C)   TempSrc: Oral   SpO2: 97%   Weight: 262 lb (118.8 kg)   Height: 5' 8\" (1.727 m)   PainSc:   0 - No pain   LMP: 2019       Hearing/Vision:   No exam data present    Learning Assessment:     Learning Assessment 2018   PRIMARY LEARNER Patient   HIGHEST LEVEL OF EDUCATION - PRIMARY LEARNER  GRADUATED HIGH SCHOOL OR GED   BARRIERS PRIMARY LEARNER NONE   CO-LEARNER CAREGIVER No   CO-LEARNER NAME -   PRIMARY LANGUAGE ENGLISH   LEARNER PREFERENCE PRIMARY LISTENING   ANSWERED BY patient   RELATIONSHIP SELF     Depression Screening:     3 most recent PHQ Screens 2018   Little interest or pleasure in doing things Several days   Feeling down, depressed, irritable, or hopeless Several days   Total Score PHQ 2 2     Fall Risk Assessment:   No flowsheet data found. Abuse Screening:     Abuse Screening Questionnaire 3/12/2018   Do you ever feel afraid of your partner? N   Are you in a relationship with someone who physically or mentally threatens you? N   Is it safe for you to go home? Y     Coordination of Care Questionaire:   1. Have you been to the ER, urgent care clinic since your last visit? Hospitalized since your last visit? NO    2. Have you seen or consulted any other health care providers outside of the [address] [place] [address] since your last visit? Include any pap smears or colon screening. NO    Advanced Directive:   1. Do you have an Advanced Directive? NO    2. Would you like information on Advanced Directives?  NO

## 2019-04-08 NOTE — PROGRESS NOTES
27 y/o with known PCOS presents to the office for ultrasound results. She has no concerns, but notes bilateral ovarian discomfort, not concerning but noticeable. She notes her last ovulation was cycle Day 10 and 9. She hasn't had a surge this month to her knowledge and does use ovulation kits. ROS: negative otherwise. Visit Vitals  BP (!) 147/98   Pulse 85   Temp 97.9 °F (36.6 °C) (Oral)   Resp 12   Ht 5' 8\" (1.727 m)   Wt 262 lb (118.8 kg)   LMP 03/25/2019   SpO2 97%   BMI 39.84 kg/m²     Gen: A&Ox3, NAD  Exam deferred    Ultrasound: Transabdominal pelvic ultrasound was initially performed   Transvaginal imaging was then performed. Color and spectral analysis of the ovaries was performed.     The uterus appears normal in size and contour. No intrauterine pregnancy. The uterus measures 8.1 x 5.3 x 6.4 cm.     Endometrial thickness measures 1.1 cm.     Both ovaries are normal in size and contour. Color and spectral Doppler  demonstrate blood flow to both ovaries. The right ovary measures 3.2 x 1.7 x 2.8 cm. Thick-walled cyst measures 1.8 x  1.3 x 1.7 cm. The left ovary measures  2.6 x 2.1 x 2.7 cm. Thick-walled cyst measures 1.4 x  1.3 x 1.3 cm. No adnexal mass or free pelvic fluid identified. This was done on Cycle Day 24    A/P  Infertility. PCOS. Normal ultrasound except for bilateral cysts. Recommend HSG and semen analysis prior to ovulation inducer. Spouse has appt on 4/10/19. The plan of care has been discussed with the patient. She has been given the opportunity to ask questions, which seem to have been answered satisfactorily.

## 2019-04-10 ENCOUNTER — HOSPITAL ENCOUNTER (OUTPATIENT)
Dept: LAB | Age: 34
Discharge: HOME | End: 2019-04-10
Payer: COMMERCIAL

## 2019-04-10 DIAGNOSIS — R73.03 PREDIABETES: ICD-10-CM

## 2019-04-10 DIAGNOSIS — F41.8 DEPRESSION WITH ANXIETY: ICD-10-CM

## 2019-04-10 DIAGNOSIS — E66.09 CLASS 2 OBESITY DUE TO EXCESS CALORIES WITHOUT SERIOUS COMORBIDITY WITH BODY MASS INDEX (BMI) OF 37.0 TO 37.9 IN ADULT: ICD-10-CM

## 2019-04-10 DIAGNOSIS — E55.9 VITAMIN D DEFICIENCY: ICD-10-CM

## 2019-04-10 DIAGNOSIS — E78.5 HYPERLIPIDEMIA, UNSPECIFIED HYPERLIPIDEMIA TYPE: ICD-10-CM

## 2019-04-10 LAB
25(OH)D3 SERPL-MCNC: 27.1 NG/ML (ref 30–100)
CHOLEST SERPL-MCNC: 183 MG/DL
EST. AVERAGE GLUCOSE BLD GHB EST-MCNC: 111 MG/DL
HBA1C MFR BLD: 5.5 % (ref 4.2–5.6)
HDLC SERPL-MCNC: 66 MG/DL (ref 40–60)
HDLC SERPL: 2.8 {RATIO} (ref 0–5)
LDLC SERPL CALC-MCNC: 101.4 MG/DL (ref 0–100)
LIPID PROFILE,FLP: ABNORMAL
TRIGL SERPL-MCNC: 78 MG/DL (ref ?–150)
VLDLC SERPL CALC-MCNC: 15.6 MG/DL

## 2019-04-10 PROCEDURE — 83036 HEMOGLOBIN GLYCOSYLATED A1C: CPT

## 2019-04-10 PROCEDURE — 36415 COLL VENOUS BLD VENIPUNCTURE: CPT

## 2019-04-10 PROCEDURE — 80061 LIPID PANEL: CPT

## 2019-04-10 PROCEDURE — 82306 VITAMIN D 25 HYDROXY: CPT

## 2019-04-25 RX ORDER — DIPHENHYDRAMINE HCL 25 MG
50 TABLET ORAL ONCE
Qty: 2 TAB | Refills: 0 | Status: SHIPPED | OUTPATIENT
Start: 2019-04-25 | End: 2019-04-25

## 2019-04-25 RX ORDER — PREDNISONE 50 MG/1
50 TABLET ORAL DAILY
Qty: 3 TAB | Refills: 0 | Status: SHIPPED | OUTPATIENT
Start: 2019-04-25 | End: 2019-06-24 | Stop reason: ALTCHOICE

## 2019-04-26 ENCOUNTER — DOCUMENTATION ONLY (OUTPATIENT)
Dept: OBGYN CLINIC | Age: 34
End: 2019-04-26

## 2019-04-29 ENCOUNTER — HOSPITAL ENCOUNTER (OUTPATIENT)
Dept: GENERAL RADIOLOGY | Age: 34
End: 2019-04-29
Attending: OBSTETRICS & GYNECOLOGY
Payer: COMMERCIAL

## 2019-04-29 ENCOUNTER — HOSPITAL ENCOUNTER (OUTPATIENT)
Dept: GENERAL RADIOLOGY | Age: 34
Discharge: HOME | End: 2019-04-29
Attending: OBSTETRICS & GYNECOLOGY
Payer: COMMERCIAL

## 2019-04-29 DIAGNOSIS — E28.2 PCOS (POLYCYSTIC OVARIAN SYNDROME): ICD-10-CM

## 2019-04-29 LAB — HCG UR QL: NEGATIVE

## 2019-04-29 PROCEDURE — 74011636320 HC RX REV CODE- 636/320: Performed by: OBSTETRICS & GYNECOLOGY

## 2019-04-29 PROCEDURE — 81025 URINE PREGNANCY TEST: CPT

## 2019-04-29 PROCEDURE — 58340 CATHETER FOR HYSTEROGRAPHY: CPT

## 2019-04-29 RX ADMIN — IOHEXOL 50 ML: 240 INJECTION, SOLUTION INTRATHECAL; INTRAVASCULAR; INTRAVENOUS; ORAL at 11:24

## 2019-06-13 ENCOUNTER — TELEPHONE (OUTPATIENT)
Dept: PHARMACY | Age: 34
End: 2019-06-13

## 2019-06-13 NOTE — TELEPHONE ENCOUNTER
CLINICAL PHARMACY NOTE - Medication Conversion Initiative    Padmini Cason is a 26 y.o. female referred to clinical pharmacy specialist - identified with current prescription(s) for METFORMIN ER GASTRIC 500 MG. Starting July 2019, the prescribed medication(s) is going to be excluded from patient's pharmacy benefit. For ministry and patient cost savings, a conversion has been identified to generic Glucophage XR    Plan:  - Medications:   Consider changing metformin formulations   Copay cards available: No  - Labs/follow up: Per provider discretion     First attempt made to reach patient by telephone to discuss formulary changes - per [employer], patient may have changed formulation already, would like to verify with patient. Left voice message for patient to return clinician's phone call to [phone] and/or [phone] option 7. Will continue to attempt to contact patient by telephone as appropriate.     Porfirio Hernandez, PharmD, [facility], [address]  Clinical Pharmacy Specialist  [phone], Option 7 lmom for pt's Mom with instructions. Asked pt after reviewing message to call office with any questions.   Also advised if develops constipation can take a daily stool softener along with the iron supplement

## 2019-06-13 NOTE — TELEPHONE ENCOUNTER
CLINICAL PHARMACY NOTE - Medication Conversion Initiative    Patient returned writer's call. Discussed metformin formulations - patient shared she was originally on immediate release metformin and then metformin ER (Glucophage XR) and had side effects. Since she has been on the gastric formulation, she has been able to tolerate the medication and has been able to consistently take. Did see documentation of this in medical chart. Will forward onto pharmacy  regarding prior authorization. Patient also inquired about Breo Ellipta inhaler as she received a letter that it was changing tiers in the pharmacy benefits plan. Per review, Ramon Genoveva will be Tier 3. Patient could switch to generic Advair (Haritha Reins), which is Tier 1, or Juno Madeline, which is Tier 2, for a lower copay. Sent [employer] message to patient with these alternative inhalers and copay card information - she will discuss with her provider at upcoming appointment.      Alex La, PharmD, [place] Esqueda, [address] [facility]  Clinical Pharmacy Specialist  [phone], Option 7

## 2019-06-19 ENCOUNTER — OFFICE VISIT (OUTPATIENT)
Dept: FAMILY MEDICINE CLINIC | Age: 34
End: 2019-06-19

## 2019-06-19 VITALS
BODY MASS INDEX: 39.77 KG/M2 | TEMPERATURE: 98.3 F | HEART RATE: 77 BPM | RESPIRATION RATE: 18 BRPM | WEIGHT: 262.4 LBS | OXYGEN SATURATION: 98 % | SYSTOLIC BLOOD PRESSURE: 132 MMHG | DIASTOLIC BLOOD PRESSURE: 88 MMHG | HEIGHT: 68 IN

## 2019-06-19 DIAGNOSIS — L05.91 PILONIDAL CYST: Primary | ICD-10-CM

## 2019-06-19 NOTE — PROGRESS NOTES
Chief Complaint   Patient presents with    Cyst     Finished 10 days of antibiotics; grown in size, less redness, tender to touch    Medication Evaluation     Needs alternative to [employer], Dulara, Symbicort    Leg Swelling     Bilateral; x2 months     1. Have you been to the ER, urgent care clinic since your last visit? Hospitalized since your last visit? Yes [facility]    2. Have you seen or consulted any other health care providers outside of the [address] [place] [address] since your last visit? Include any pap smears or colon screening.  No

## 2019-06-19 NOTE — PATIENT INSTRUCTIONS
Pilonidal Abscess: Care Instructions  Your Care Instructions    A pilonidal abscess is an infection caused by an ingrown hair. The abscess occurs in the area of the tailbone and the top of the buttocks. The infection causes a pocket of pus to form. It can be quite painful. Your doctor may have opened and drained the abscess. You can take care of yourself at home to help the area heal. In some cases, the abscess returns. Your doctor may suggest surgery to remove the site of the infection if it comes back. You may have had a sedative to help you relax. You may be unsteady after having sedation. It can take a few hours for the medicine's effects to wear off. Common side effects of sedation include nausea, vomiting, and feeling sleepy or tired. The doctor has checked you carefully, but problems can develop later. If you notice any problems or new symptoms, get medical treatment right away. Follow-up care is a key part of your treatment and safety. Be sure to make and go to all appointments, and call your doctor if you are having problems. It's also a good idea to know your test results and keep a list of the medicines you take. How can you care for yourself at home? · If the doctor gave you a sedative:  ? For 24 hours, don't do anything that requires attention to detail. It takes time for the medicine's effects to completely wear off.  ? For your safety, do not drive or operate any machinery that could be dangerous. Wait until the medicine wears off and you can think clearly and react easily. · If your doctor prescribed antibiotics, take them exactly as directed. Do not stop taking them just because you feel better. You need to take the full course of antibiotics. · Be safe with medicines. Take pain medicines exactly as directed. ? If the doctor gave you a prescription medicine for pain, take it as prescribed.   ? If you are not taking a prescription pain medicine, ask your doctor if you can take an over-the-counter medicine. · If your doctor opened and drained your abscess, you may have gauze or other packing material inside your wound. Follow all instructions from your doctor on how to care for your wound. · Keep the area of your wound very clean. Use wet cotton balls, a warm washcloth, or baby wipes. Clean the area gently, especially after a bowel movement. When should you call for help? Call 911 anytime you think you may need emergency care. For example, call if:    · You have trouble breathing.     · You passed out (lost consciousness).    Call your doctor now or seek immediate medical care if:    · You have new or worse nausea or vomiting.     · You have symptoms of infection, such as:  ? Increased pain, swelling, warmth, or redness. ? Red streaks leading from the area. ? Pus draining from the area. ? A fever.    Watch closely for changes in your health, and be sure to contact your doctor if:    · You do not get better as expected. Where can you learn more? Go to http://mateo-claudia.info/. Enter 22 [id number] in the search box to learn more about \"Pilonidal Abscess: Care Instructions. \"  Current as of: April 17, 2018  Content Version: 11.9  © 2436-0770 [employer]. Care instructions adapted under license by [employer] (which disclaims liability or warranty for this information). If you have questions about a medical condition or this instruction, always ask your healthcare professional. [place] [address] any warranty or liability for your use of this information.

## 2019-06-24 ENCOUNTER — OFFICE VISIT (OUTPATIENT)
Dept: SURGERY | Age: 34
End: 2019-06-24

## 2019-06-24 VITALS
HEIGHT: 68 IN | OXYGEN SATURATION: 97 % | TEMPERATURE: 97.9 F | BODY MASS INDEX: 40.47 KG/M2 | WEIGHT: 267 LBS | SYSTOLIC BLOOD PRESSURE: 123 MMHG | DIASTOLIC BLOOD PRESSURE: 75 MMHG | HEART RATE: 61 BPM

## 2019-06-24 DIAGNOSIS — L05.91 PILONIDAL CYST: Primary | ICD-10-CM

## 2019-06-24 NOTE — PERIOP NOTES
PAT - SURGICAL PRE-ADMISSION INSTRUCTIONS    NAME:  Terrie Paige                                                          TODAY'S DATE:  6/24/2019    SURGERY DATE:  6/25/2019                                  SURGERY ARRIVAL TIME:   TBV    1. Do NOT eat or drink anything, including candy or gum, after MIDNIGHT on 6/24/19 , unless you have specific instructions from your Surgeon or Anesthesia Provider to do so. 2. No smoking on the day of surgery. 3. No alcohol 24 hours prior to the day of surgery. 4. No recreational drugs for one week prior to the day of surgery. 5. Leave all valuables, including money/purse, at home. 6. Remove all jewelry, nail polish, makeup (including mascara); no lotions, powders, deodorant, or perfume/cologne/after shave. 7. Glasses/Contact lenses and Dentures may be worn to the hospital.  They will be removed prior to surgery. 8. Call your doctor if symptoms of a cold or illness develop within 24 ours prior to surgery. 9. AN ADULT MUST DRIVE YOU HOME AFTER OUTPATIENT SURGERY. 10. If you are having an OUTPATIENT procedure, please make arrangements for a responsible adult to be with you for 24 hours after your surgery. 11. If you are admitted to the hospital, you will be assigned to a bed after surgery is complete. Normally a family member will not be able to see you until you are in your assigned bed. 15. Family is encouraged to accompany you to the hospital.  We ask visitors in the treatment area to be limited to ONE person at a time to ensure patient privacy. EXCEPTIONS WILL BE MADE AS NEEDED. 15. Children under 12 are discouraged from entering the treatment area and need to be supervised by an adult when in the waiting room. Special Instructions:  Bring EPIPEN  Bring inhaler. , Take these medications the morning of surgery with a sip of water:  ELLIPTA, PEPCID, ZOLOFT, HOLD metformin/glucophage dose starting the EVENING BEFORE the day of surgery.     Patient Prep:    shower with anti-bacterial soap    These surgical instructions were reviewed with PATIENT during the PAT PHONE CALL. Directions: On the morning of surgery, please go to the [address] [facility]. Enter the building from the [facility] entrance, 1st floor (next to the Emergency Room entrance). Take the elevator to the 2nd floor. Sign in at the Registration Desk.     If you have any questions and/or concerns, please do not hesitate to call:  (Prior to the day of surgery)  [facility] unit:  [phone]  (Day of surgery)  [facility] unit:  [phone]

## 2019-06-24 NOTE — PROGRESS NOTES
General Surgery Consult    Keeley Sinha  Admit date: (Not on file)    MRN: [id number]     : 1985     Age: 26 y.o. 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 The patient has stated that about a month ago she noticed a discomfort in the lower back/buttock area. On  she noticed a lump and it was very hard [facility] and she was diagnosed with a pilonidal cyst. At that point there was no drainage but only discomfort and some erythema. She was prescribed antibiotics but no drainage was performed. She stated that she is currently feeling better but the erythema has gone and there is still no drainage. However she still feels that bulge. She denies any fever or chills or any change in bowel habits.     Patient Active Problem List    Diagnosis Date Noted    Depression 2018    Gastroesophageal reflux disease without esophagitis 2018    Prediabetes 2018    Elevated uric acid in blood 2018    Elevated Lp(a) 2018    Hyperlipidemia 2018    PCOS (polycystic ovarian syndrome) 2018    Obesity, morbid ([address] 04/10/2018     Past Medical History:   Diagnosis Date    Anxiety     Asthma     Environmental allergies     GERD (gastroesophageal reflux disease)     HLD (hyperlipidemia)     HSV-2 infection     PCOS (polycystic ovarian syndrome)     Prediabetes     Vitamin D deficiency       Past Surgical History:   Procedure Laterality Date    HX BREAST LUMPECTOMY Left     HX KNEE ARTHROSCOPY Right     HX WISDOM TEETH EXTRACTION        Social History     Tobacco Use    Smoking status: Never Smoker    Smokeless tobacco: Never Used   Substance Use Topics    Alcohol use: Yes     Comment: 1 glass of wine a month      Social History     Tobacco Use   Smoking Status Never Smoker   Smokeless Tobacco Never Used     Family History   Problem Relation Age of Onset    Asthma Mother     GERD Mother     Substance Abuse Mother     Diabetes Father     Sleep Apnea Father     Hypertension Father     Elevated Lipids Father     Alcohol abuse Father     GERD Brother     Cancer Paternal Grandmother         throat 2/2 smoking      Current Outpatient Medications   Medication Sig    therapeutic multivitamin (THEREMS) tablet Take 1 Tab by mouth.  metFORMIN (GLUMETZA ER) 500 mg TG24 24 hour tablet Take 1,000 mg by mouth two (2) times a day.  montelukast (SINGULAIR) 10 mg tablet Take 1 Tab by mouth daily.  sertraline (ZOLOFT) 100 mg tablet Take 1 Tab by mouth daily.  hydrOXYzine HCl (ATARAX) 50 mg tablet Take 1 Tab by mouth three (3) times daily as needed for Anxiety.  fluticasone-vilanterol (BREO ELLIPTA) 200-25 mcg/dose inhaler Take 1 Puff by inhalation daily. Indications: MAINTENANCE THERAPY FOR ASTHMA    valACYclovir (VALTREX) 1 gram tablet Take 1 Tab by mouth daily. (Patient taking differently: Take 1,000 mg by mouth daily as needed.)    cholecalciferol (VITAMIN D3) 1,000 unit tablet Take 2 Tabs by mouth daily.  EPIPEN 2-BOB 0.3 mg/0.3 mL injection INJECT 1 PEN INTO LATERAL THIGH FOR SYMPTOMS OF ANAPHYLAXIS.  albuterol sulfate (PROAIR RESPICLICK) 90 mcg/actuation aepb Take 2 Puffs by inhalation every four (4) hours as needed.  cetirizine (ZYRTEC) 10 mg tablet Take  by mouth.  famotidine (PEPCID) 20 mg tablet Take 20 mg by mouth as needed. No current facility-administered medications for this visit. Allergies   Allergen Reactions    Latex Atopic Dermatitis    Nubain [Nalbuphine] Hives    Sulfa (Sulfonamide Antibiotics) Hives    Iodine Hives    Shellfish Derived Hives          Review of Systems:  Pertinent items are noted in the History of Present Illness.     Objective:     Visit Vitals  /75 (BP 1 Location: Right arm, BP Patient Position: Sitting)   Pulse 61 Temp 97.9 °F (36.6 °C) (Oral)   Ht 5' 8\" (1.727 m)   Wt 121.1 kg (267 lb)   LMP 06/19/2019   SpO2 97%   BMI 40.60 kg/m²       Physical Exam:      General:  in no apparent distress, alert, oriented times 3 and afebrile   Eyes:  conjunctivae and sclerae normal, pupils equal, round, reactive to light               Lower Back/buttocks: There is an area of induration about 2 x 2 centimeter located on the right and the left side of the midline. There is an ulcer 1 x 1 cm induration that is located slightly to the left side below the other 2.the area and shape are consistent with a pilonidal cyst however I could not clearly find any skin opening. There is no erythema or drainage. There is very minimal tenderness on deep palpation. Imaging and Lab Review:     CBC:   Lab Results   Component Value Date/Time    WBC 8.9 03/14/2018 11:08 AM    RBC 4.45 03/14/2018 11:08 AM    HGB 13.1 03/14/2018 11:08 AM    HCT 39.6 03/14/2018 11:08 AM    PLATELET 380 19/22/9706 11:08 AM     BMP:   Lab Results   Component Value Date/Time    Glucose 83 03/14/2018 11:08 AM    Sodium 137 03/14/2018 11:08 AM    Potassium 4.2 03/14/2018 11:08 AM    Chloride 102 03/14/2018 11:08 AM    CO2 27 03/14/2018 11:08 AM    BUN 12 03/14/2018 11:08 AM    Creatinine 0.59 (L) 03/14/2018 11:08 AM    Calcium 8.9 03/14/2018 11:08 AM     CMP:  Lab Results   Component Value Date/Time    Glucose 83 03/14/2018 11:08 AM    Sodium 137 03/14/2018 11:08 AM    Potassium 4.2 03/14/2018 11:08 AM    Chloride 102 03/14/2018 11:08 AM    CO2 27 03/14/2018 11:08 AM    BUN 12 03/14/2018 11:08 AM    Creatinine 0.59 (L) 03/14/2018 11:08 AM    Calcium 8.9 03/14/2018 11:08 AM    Anion gap 8 03/14/2018 11:08 AM    BUN/Creatinine ratio 20 03/14/2018 11:08 AM    Alk.  phosphatase 86 03/14/2018 11:08 AM    Protein, total 7.8 03/14/2018 11:08 AM    Albumin 3.7 03/14/2018 11:08 AM    Globulin 4.1 (H) 03/14/2018 11:08 AM    A-G Ratio 0.9 03/14/2018 11:08 AM       No results found for this or any previous visit (from the past 24 hour(s)). images and reports reviewed    Assessment:   Tej King is a 26 y.o. female is presenting with possible pilonidal cyst or a lower back/buttock abscesses that are healing relatively well with the antibiotics. Though the patient has finished the course of the antibiotics and the collection/indurations are still present. I did suggest that we should proceed with surgical intervention including incision and drainage and possible excision of pilonidal cyst depending on what we found during the surgical exploration. The patient did agree with that and she would like to schedule the surgery. Plan:     Scheduled for excision of pilonidal cyst possible incision and drainage of lower back/buttock abscesses.     Please call me if you have any questions (cell phone: [phone])     Signed By: Anna Perez MD     June 24, 2019

## 2019-06-24 NOTE — PATIENT INSTRUCTIONS
If you have any questions or concerns about today's appointment, the verbal and/or written instructions you were given for follow up care, please call our office at [phone]. [facility] [id number] [place] [address], [address] [facility], [facility] 
 
[phone] office [phone] fax PATIENT PRE AND POST OPERATIVE INSTRUCTIONS [address] [place] [address], [address] 
[phone] Before Surgery Instructions:  
1) You must have someone available to drive you to and from your procedure and stay with you for the first 24 hours. 2) It is very important that you have nothing to eat or drink after midnight the night before your surgery. This includes chewing gum or sucking on hard candy. Take only heart, blood pressure and cholesterol medications the morning of surgery with only a sip of water. 3) Please stop taking Plavix 10 days prior to your surgery. Stop taking Coumadin 5 days prior to your surgery. Stop taking all Aspirin or Aspirin containing products 7 days prior to your surgery. Stop taking Advil, Motrin, Aleve, and etc. 3 days prior to your surgery. 4) If you take any diabetic medications please consult with your primary care physician on how to take them on the day of your surgery 5) Please stop all Herbal products 2 weeks prior to your surgery. 6) Please arrive at the hospital 2 hours prior to your surgery, unless you have been otherwise instructed. Any required labs/urine drug screen/x-rays will be performed on day of surgery. 7) If you are of child bearing age you will have pregnancy test done the morning of your surgery as soon as you arrive. 8) Patients having an operation on their colon will be given a separate instruction sheet on their Bowel Prep. 9) For any pre-operative work up check in at the main entrance to [address] [place] [address], and then go to Patient Registration.  These studies are done on a walk in basis they are open from 7:00am to 5:00pm Monday through Friday. 10) Please wash your surgical site the morning of your surgery with antibacterial (i.e. Dial) soap and water. 11) You will be contacted by a hospital preadmission nurse approximately one week prior to scheduled surgery to discuss additional instructions and to review your medications. 12) You may be contacted to change your surgery time. At times this is necessary due to equipment, staffing needs or in the event of a cancellation. Surgery Date and Time:  Tuesday, June 25, 2019 at 10:00am 
 
Please check in at [facility], enter through the Emergency Room entrance and go up to the second floor. Please check in by 8:00am the day of your surgery. After Surgery Instructions: You will need to be seen in the office for a follow-up visit 7-14 days after your surgery. Please call after you have had the procedure to make this appointment. Unless otherwise instructed, you may remove your outer bandage and shower 48 hours after your surgery. If you develop a fever greater than 101, have any significant drainage, bleeding, swelling and/or pus of the wound. Please call our office immediately. You may contact Cate Velazquez with any questions at [phone].

## 2019-06-24 NOTE — PROGRESS NOTES
Sherry Miranda is a 26 y.o. female (: 1985) presenting to address:    Chief Complaint   Patient presents with    Cyst     Pilonidal cyst        Medication list and allergies have been reviewed with Sherry Miranda and updated as of today's date. I have gone over all Medical, Surgical and Social History with Sherry Miranda and updated/added the information accordingly.

## 2019-06-25 ENCOUNTER — ANESTHESIA EVENT (OUTPATIENT)
Dept: SURGERY | Age: 34
End: 2019-06-25
Payer: COMMERCIAL

## 2019-06-25 ENCOUNTER — HOSPITAL ENCOUNTER (OUTPATIENT)
Age: 34
Setting detail: OUTPATIENT SURGERY
Discharge: HOME | End: 2019-06-25
Attending: SURGERY | Admitting: SURGERY
Payer: COMMERCIAL

## 2019-06-25 ENCOUNTER — ANESTHESIA (OUTPATIENT)
Dept: SURGERY | Age: 34
End: 2019-06-25
Payer: COMMERCIAL

## 2019-06-25 VITALS
TEMPERATURE: 97 F | HEIGHT: 68 IN | RESPIRATION RATE: 18 BRPM | WEIGHT: 267.06 LBS | SYSTOLIC BLOOD PRESSURE: 121 MMHG | DIASTOLIC BLOOD PRESSURE: 79 MMHG | HEART RATE: 56 BPM | BODY MASS INDEX: 40.48 KG/M2 | OXYGEN SATURATION: 96 %

## 2019-06-25 DIAGNOSIS — L05.91 PILONIDAL CYST: Primary | ICD-10-CM

## 2019-06-25 LAB
GLUCOSE BLD STRIP.AUTO-MCNC: 95 MG/DL (ref 70–110)
HCG UR QL: NEGATIVE

## 2019-06-25 PROCEDURE — 74011250636 HC RX REV CODE- 250/636: Performed by: NURSE ANESTHETIST, CERTIFIED REGISTERED

## 2019-06-25 PROCEDURE — 74011250637 HC RX REV CODE- 250/637: Performed by: ANESTHESIOLOGY

## 2019-06-25 PROCEDURE — 76210000006 HC OR PH I REC 0.5 TO 1 HR: Performed by: SURGERY

## 2019-06-25 PROCEDURE — 77030018842 HC SOL IRR SOD CL 9% BAXT -A: Performed by: SURGERY

## 2019-06-25 PROCEDURE — 88300 SURGICAL PATH GROSS: CPT

## 2019-06-25 PROCEDURE — 77030031139 HC SUT VCRL2 J&J -A: Performed by: SURGERY

## 2019-06-25 PROCEDURE — 76010000160 HC OR TIME 0.5 TO 1 HR INTENSV-TIER 1: Performed by: SURGERY

## 2019-06-25 PROCEDURE — 77030008683 HC TU ET CUF COVD -A: Performed by: ANESTHESIOLOGY

## 2019-06-25 PROCEDURE — 76210000021 HC REC RM PH II 0.5 TO 1 HR: Performed by: SURGERY

## 2019-06-25 PROCEDURE — 82962 GLUCOSE BLOOD TEST: CPT

## 2019-06-25 PROCEDURE — 74011000272 HC RX REV CODE- 272: Performed by: SURGERY

## 2019-06-25 PROCEDURE — 74011250636 HC RX REV CODE- 250/636: Performed by: ANESTHESIOLOGY

## 2019-06-25 PROCEDURE — 74011250636 HC RX REV CODE- 250/636: Performed by: SURGERY

## 2019-06-25 PROCEDURE — 88304 TISSUE EXAM BY PATHOLOGIST: CPT

## 2019-06-25 PROCEDURE — 74011250636 HC RX REV CODE- 250/636

## 2019-06-25 PROCEDURE — 76060000032 HC ANESTHESIA 0.5 TO 1 HR: Performed by: SURGERY

## 2019-06-25 PROCEDURE — 77030011267 HC ELECTRD BLD COVD -A: Performed by: SURGERY

## 2019-06-25 PROCEDURE — 74011000250 HC RX REV CODE- 250

## 2019-06-25 PROCEDURE — 77030008477 HC STYL SATN SLP COVD -A: Performed by: ANESTHESIOLOGY

## 2019-06-25 PROCEDURE — 81025 URINE PREGNANCY TEST: CPT

## 2019-06-25 RX ORDER — CEFAZOLIN SODIUM 2 G/50ML
2 SOLUTION INTRAVENOUS
Status: COMPLETED | OUTPATIENT
Start: 2019-06-25 | End: 2019-06-25

## 2019-06-25 RX ORDER — SODIUM CHLORIDE, SODIUM LACTATE, POTASSIUM CHLORIDE, CALCIUM CHLORIDE 600; 310; 30; 20 MG/100ML; MG/100ML; MG/100ML; MG/100ML
50 INJECTION, SOLUTION INTRAVENOUS CONTINUOUS
Status: DISCONTINUED | OUTPATIENT
Start: 2019-06-25 | End: 2019-06-25 | Stop reason: HOSPADM

## 2019-06-25 RX ORDER — CEPHALEXIN 750 MG/1
750 CAPSULE ORAL 3 TIMES DAILY
Qty: 30 CAP | Refills: 0 | Status: SHIPPED | OUTPATIENT
Start: 2019-06-25 | End: 2021-04-24

## 2019-06-25 RX ORDER — NEOSTIGMINE METHYLSULFATE 5 MG/5 ML
SYRINGE (ML) INTRAVENOUS AS NEEDED
Status: DISCONTINUED | OUTPATIENT
Start: 2019-06-25 | End: 2019-06-25 | Stop reason: HOSPADM

## 2019-06-25 RX ORDER — SODIUM CHLORIDE 0.9 % (FLUSH) 0.9 %
5-40 SYRINGE (ML) INJECTION AS NEEDED
Status: DISCONTINUED | OUTPATIENT
Start: 2019-06-25 | End: 2019-06-25 | Stop reason: HOSPADM

## 2019-06-25 RX ORDER — FENTANYL CITRATE 50 UG/ML
INJECTION, SOLUTION INTRAMUSCULAR; INTRAVENOUS AS NEEDED
Status: DISCONTINUED | OUTPATIENT
Start: 2019-06-25 | End: 2019-06-25 | Stop reason: HOSPADM

## 2019-06-25 RX ORDER — OXYCODONE AND ACETAMINOPHEN 5; 325 MG/1; MG/1
1 TABLET ORAL
Qty: 24 TAB | Refills: 0 | Status: SHIPPED | OUTPATIENT
Start: 2019-06-25 | End: 2019-06-28

## 2019-06-25 RX ORDER — OXYCODONE AND ACETAMINOPHEN 5; 325 MG/1; MG/1
1 TABLET ORAL ONCE
Status: COMPLETED | OUTPATIENT
Start: 2019-06-25 | End: 2019-06-25

## 2019-06-25 RX ORDER — DEXAMETHASONE SODIUM PHOSPHATE 4 MG/ML
INJECTION, SOLUTION INTRA-ARTICULAR; INTRALESIONAL; INTRAMUSCULAR; INTRAVENOUS; SOFT TISSUE AS NEEDED
Status: DISCONTINUED | OUTPATIENT
Start: 2019-06-25 | End: 2019-06-25 | Stop reason: HOSPADM

## 2019-06-25 RX ORDER — HYDROMORPHONE HYDROCHLORIDE 2 MG/ML
0.5 INJECTION, SOLUTION INTRAMUSCULAR; INTRAVENOUS; SUBCUTANEOUS
Status: DISCONTINUED | OUTPATIENT
Start: 2019-06-25 | End: 2019-06-25 | Stop reason: HOSPADM

## 2019-06-25 RX ORDER — SODIUM CHLORIDE, SODIUM LACTATE, POTASSIUM CHLORIDE, CALCIUM CHLORIDE 600; 310; 30; 20 MG/100ML; MG/100ML; MG/100ML; MG/100ML
25 INJECTION, SOLUTION INTRAVENOUS CONTINUOUS
Status: DISCONTINUED | OUTPATIENT
Start: 2019-06-25 | End: 2019-06-25 | Stop reason: HOSPADM

## 2019-06-25 RX ORDER — PROPOFOL 10 MG/ML
INJECTION, EMULSION INTRAVENOUS AS NEEDED
Status: DISCONTINUED | OUTPATIENT
Start: 2019-06-25 | End: 2019-06-25 | Stop reason: HOSPADM

## 2019-06-25 RX ORDER — SCOLOPAMINE TRANSDERMAL SYSTEM 1 MG/1
1 PATCH, EXTENDED RELEASE TRANSDERMAL ONCE
Status: DISCONTINUED | OUTPATIENT
Start: 2019-06-25 | End: 2019-06-25 | Stop reason: HOSPADM

## 2019-06-25 RX ORDER — LIDOCAINE HYDROCHLORIDE 20 MG/ML
INJECTION, SOLUTION EPIDURAL; INFILTRATION; INTRACAUDAL; PERINEURAL AS NEEDED
Status: DISCONTINUED | OUTPATIENT
Start: 2019-06-25 | End: 2019-06-25 | Stop reason: HOSPADM

## 2019-06-25 RX ORDER — KETOROLAC TROMETHAMINE 30 MG/ML
INJECTION, SOLUTION INTRAMUSCULAR; INTRAVENOUS AS NEEDED
Status: DISCONTINUED | OUTPATIENT
Start: 2019-06-25 | End: 2019-06-25 | Stop reason: HOSPADM

## 2019-06-25 RX ORDER — ONDANSETRON 2 MG/ML
4 INJECTION INTRAMUSCULAR; INTRAVENOUS ONCE
Status: DISCONTINUED | OUTPATIENT
Start: 2019-06-25 | End: 2019-06-25 | Stop reason: HOSPADM

## 2019-06-25 RX ORDER — SUCCINYLCHOLINE CHLORIDE 20 MG/ML
INJECTION INTRAMUSCULAR; INTRAVENOUS AS NEEDED
Status: DISCONTINUED | OUTPATIENT
Start: 2019-06-25 | End: 2019-06-25 | Stop reason: HOSPADM

## 2019-06-25 RX ORDER — VECURONIUM BROMIDE FOR INJECTION 1 MG/ML
INJECTION, POWDER, LYOPHILIZED, FOR SOLUTION INTRAVENOUS AS NEEDED
Status: DISCONTINUED | OUTPATIENT
Start: 2019-06-25 | End: 2019-06-25 | Stop reason: HOSPADM

## 2019-06-25 RX ORDER — FENTANYL CITRATE 50 UG/ML
50 INJECTION, SOLUTION INTRAMUSCULAR; INTRAVENOUS AS NEEDED
Status: DISCONTINUED | OUTPATIENT
Start: 2019-06-25 | End: 2019-06-25 | Stop reason: HOSPADM

## 2019-06-25 RX ORDER — SODIUM CHLORIDE 0.9 % (FLUSH) 0.9 %
5-40 SYRINGE (ML) INJECTION EVERY 8 HOURS
Status: DISCONTINUED | OUTPATIENT
Start: 2019-06-25 | End: 2019-06-25 | Stop reason: HOSPADM

## 2019-06-25 RX ORDER — GLYCOPYRROLATE 0.2 MG/ML
INJECTION INTRAMUSCULAR; INTRAVENOUS AS NEEDED
Status: DISCONTINUED | OUTPATIENT
Start: 2019-06-25 | End: 2019-06-25 | Stop reason: HOSPADM

## 2019-06-25 RX ORDER — SCOLOPAMINE TRANSDERMAL SYSTEM 1 MG/1
PATCH, EXTENDED RELEASE TRANSDERMAL
Status: DISCONTINUED
Start: 2019-06-25 | End: 2019-06-25 | Stop reason: HOSPADM

## 2019-06-25 RX ORDER — MIDAZOLAM HYDROCHLORIDE 1 MG/ML
INJECTION, SOLUTION INTRAMUSCULAR; INTRAVENOUS AS NEEDED
Status: DISCONTINUED | OUTPATIENT
Start: 2019-06-25 | End: 2019-06-25 | Stop reason: HOSPADM

## 2019-06-25 RX ORDER — ONDANSETRON 2 MG/ML
INJECTION INTRAMUSCULAR; INTRAVENOUS AS NEEDED
Status: DISCONTINUED | OUTPATIENT
Start: 2019-06-25 | End: 2019-06-25 | Stop reason: HOSPADM

## 2019-06-25 RX ADMIN — LIDOCAINE HYDROCHLORIDE 60 MG: 20 INJECTION, SOLUTION EPIDURAL; INFILTRATION; INTRACAUDAL; PERINEURAL at 09:26

## 2019-06-25 RX ADMIN — KETOROLAC TROMETHAMINE 30 MG: 30 INJECTION, SOLUTION INTRAMUSCULAR; INTRAVENOUS at 09:55

## 2019-06-25 RX ADMIN — SODIUM CHLORIDE, SODIUM LACTATE, POTASSIUM CHLORIDE, AND CALCIUM CHLORIDE 25 ML/HR: 600; 310; 30; 20 INJECTION, SOLUTION INTRAVENOUS at 07:14

## 2019-06-25 RX ADMIN — MIDAZOLAM HYDROCHLORIDE 2 MG: 1 INJECTION, SOLUTION INTRAMUSCULAR; INTRAVENOUS at 09:21

## 2019-06-25 RX ADMIN — CEFAZOLIN SODIUM 2 G: 2 SOLUTION INTRAVENOUS at 09:37

## 2019-06-25 RX ADMIN — FENTANYL CITRATE 100 MCG: 50 INJECTION, SOLUTION INTRAMUSCULAR; INTRAVENOUS at 09:26

## 2019-06-25 RX ADMIN — OXYCODONE HYDROCHLORIDE AND ACETAMINOPHEN 1 TABLET: 5; 325 TABLET ORAL at 11:57

## 2019-06-25 RX ADMIN — SUCCINYLCHOLINE CHLORIDE 100 MG: 20 INJECTION INTRAMUSCULAR; INTRAVENOUS at 09:27

## 2019-06-25 RX ADMIN — ONDANSETRON 4 MG: 2 INJECTION INTRAMUSCULAR; INTRAVENOUS at 09:31

## 2019-06-25 RX ADMIN — VECURONIUM BROMIDE FOR INJECTION 3 MG: 1 INJECTION, POWDER, LYOPHILIZED, FOR SOLUTION INTRAVENOUS at 09:32

## 2019-06-25 RX ADMIN — PROPOFOL 200 MG: 10 INJECTION, EMULSION INTRAVENOUS at 09:26

## 2019-06-25 RX ADMIN — DEXAMETHASONE SODIUM PHOSPHATE 4 MG: 4 INJECTION, SOLUTION INTRA-ARTICULAR; INTRALESIONAL; INTRAMUSCULAR; INTRAVENOUS; SOFT TISSUE at 09:31

## 2019-06-25 RX ADMIN — GLYCOPYRROLATE 0.4 MG: 0.2 INJECTION INTRAMUSCULAR; INTRAVENOUS at 09:54

## 2019-06-25 RX ADMIN — FENTANYL CITRATE 25 MCG: 50 INJECTION, SOLUTION INTRAMUSCULAR; INTRAVENOUS at 10:25

## 2019-06-25 RX ADMIN — Medication 3 MG: at 09:54

## 2019-06-25 NOTE — ANESTHESIA POSTPROCEDURE EVALUATION
Procedure(s): . Excision of PILONIDAL CYST 2. Incision and drainage of 2 abscesses   . general    Anesthesia Post Evaluation      Multimodal analgesia: multimodal analgesia used between 6 hours prior to anesthesia start to PACU discharge  Patient location during evaluation: bedside  Patient participation: complete - patient participated  Level of consciousness: awake  Pain management: adequate  Airway patency: patent  Anesthetic complications: no  Cardiovascular status: stable  Respiratory status: acceptable  Hydration status: acceptable  Post anesthesia nausea and vomiting:  controlled      Vitals Value Taken Time   /78 6/25/2019 10:46 AM   Temp 36.1 °C (97 °F) 6/25/2019 10:04 AM   Pulse 50 6/25/2019 11:01 AM   Resp 12 6/25/2019 11:01 AM   SpO2 94 % 6/25/2019 11:01 AM   Vitals shown include unvalidated device data.

## 2019-06-25 NOTE — ANESTHESIA PREPROCEDURE EVALUATION
Relevant Problems   No relevant active problems       Anesthetic History     PONV          Review of Systems / Medical History  Patient summary reviewed and pertinent labs reviewed    Pulmonary            Asthma        Neuro/Psych   Within defined limits           Cardiovascular  Within defined limits                Exercise tolerance: >4 METS     GI/Hepatic/Renal     GERD           Endo/Other        Morbid obesity     Other Findings              Physical Exam    Airway  Mallampati: II  TM Distance: 4 - 6 cm  Neck ROM: normal range of motion   Mouth opening: Normal     Cardiovascular  Regular rate and rhythm,  S1 and S2 normal,  no murmur, click, rub, or gallop  Rhythm: regular  Rate: normal         Dental    Dentition: Lower dentition intact and Upper dentition intact     Pulmonary  Breath sounds clear to auscultation               Abdominal  GI exam deferred       Other Findings            Anesthetic Plan    ASA: 2  Anesthesia type: general          Induction: Intravenous  Anesthetic plan and risks discussed with: Patient

## 2019-06-25 NOTE — PERIOP NOTES
Pre-Op Summary    Pt arrived via car with family/friend and is oriented to time, place, person and situation. Patient with steady gait with none assistive devices. Visit Vitals  /84 (BP 1 Location: Left arm, BP Patient Position: At rest)   Pulse 75   Temp 98.6 °F (37 °C)   Resp 16   Ht 5' 8\" (1.727 m)   Wt 121.1 kg (267 lb 1 oz)   LMP 06/19/2019   SpO2 97%   BMI 40.61 kg/m²       Peripheral IV located on Left hand . Patients belongings are located with [facility]. Patient's point of contact is toya Lyles and their contact number is: [phone]. They will be in the waiting room. They are able to receive medication information. They will be their ride home.

## 2019-06-25 NOTE — PROGRESS NOTES
conducted a pre-surgery visit with Gracie Faria, who is a 26 y.o.,female. The  provided the following Interventions:  Initiated a relationship of care and support. Offered prayer and assurance of continued prayers on patient's behalf. Plan:  Chaplains will continue to follow and will provide pastoral care on an as needed/requested basis.  recommends bedside caregivers page  on duty if patient shows signs of acute spiritual or emotional distress.     Nam Alfaro   Spiritual Care   [phone]

## 2019-06-25 NOTE — DISCHARGE INSTRUCTIONS
Discharge Instructions Following Surgery    Patient: Adolph Vasquez MRN: [id number]  SSN: [id number]    YOB: 1985  Age: 26 y.o. Sex: female      Activity  · As tolerated, walking encourage, stairs are okay. · Avoid strenuous activities - no lifting anything heavier than 15 pounds till seen in the clinic. · You may shower at home after 48 hours. Diet  · Regular diet after nausea from the anesthetic has passed. Pain  · Take pain medication as directed by your doctor. Please add Aleve or Ibuprofen as needed (If no contraindications for these types of medications). ·  Call your doctor if pain is NOT relieved by medication. Wound and Dressing Care  · There is glue on the wounds. No need for any dressing care. · Apply ice packs to the area of the surgery (like in inguinal hernia apply to the groin not the incisions) for the first 1 to 2 days  · Apply warm compresses after 2 days for pain relieve if needed    After Anesthesia  · For the first 24 hours: DO NOT Drive, Drink alcoholic beverages, or Make important decisions. · Be aware of dizziness following anesthesia and while taking pain medication. Call your doctor if  · Excessive bleeding that does not stop after holding mild pressure over the area. · Temperature of 101 degrees F or above. · Redness,excessive swelling or bruising, and/or green or yellow, smelly discharge from incision. · If nausea and vomiting continues. Appointment date/time Follow-Up Phone Calls    · Call the office at [phone] to make your follow-up appointment in 2 weeks after the surgery (if not already set up) . Dr. Juancho Morel cell phone number is [phone]. Please call me if you have any concerns or questions.        DISCHARGE SUMMARY from Nurse    PATIENT INSTRUCTIONS:    After general anesthesia or intravenous sedation, for 24 hours or while taking prescription Narcotics:  · Limit your activities  · Do not drive and operate hazardous machinery  · Do not make important personal or business decisions  · Do  not drink alcoholic beverages  · If you have not urinated within 8 hours after discharge, please contact your surgeon on call. Report the following to your surgeon:  · Excessive pain, swelling, redness or odor of or around the surgical area  · Temperature over 100.5  · Nausea and vomiting lasting longer than 4 hours or if unable to take medications  · Any signs of decreased circulation or nerve impairment to extremity: change in color, persistent  numbness, tingling, coldness or increase pain  · Any questions    What to do at Home:  Recommended activity: Activity as tolerated and no driving for today,     These are general instructions for a healthy lifestyle:    No smoking/ No tobacco products/ Avoid exposure to second hand smoke  Surgeon General's Warning:  Quitting smoking now greatly reduces serious risk to your health. Obesity, smoking, and sedentary lifestyle greatly increases your risk for illness    A healthy diet, regular physical exercise & weight monitoring are important for maintaining a healthy lifestyle    You may be retaining fluid if you have a history of heart failure or if you experience any of the following symptoms:  Weight gain of 3 pounds or more overnight or 5 pounds in a week, increased swelling in our hands or feet or shortness of breath while lying flat in bed. Please call your doctor as soon as you notice any of these symptoms; do not wait until your next office visit. The discharge information has been reviewed with the patient. The patient verbalized understanding. Discharge medications reviewed with the patient and appropriate educational materials and side effects teaching were provided. ___________________________________________________________________________________________________________________________________    Patient armband removed and given to patient to take home.   Patient was informed of the privacy risks if armband lost or stolen

## 2019-06-25 NOTE — OP NOTES
[facility]  OPERATIVE REPORT    Name:  Azeem Quintero  MR#:   [id number]  :  1985  ACCOUNT #:  [de-identified]  DATE OF SERVICE:  2019    PREOPERATIVE DIAGNOSIS:  Possible infected pilonidal cyst with buttock abscesses. POSTOPERATIVE DIAGNOSES:  Pilonidal cyst and buttock abscesses. PROCEDURES PERFORMED:  1. Excision of pilonidal cyst.  2.  Incision and drainage of 2 large buttock abscesses. SURGEON:  Deandra Chowdhury MD    ASSISTANT:  David Judd SA    ANESTHESIA:  General.    COMPLICATIONS:  None. SPECIMENS REMOVED:  1. A pilonidal cyst.  2.  A possible foreign body. IMPLANTS:  None. ESTIMATED BLOOD LOSS:  Minimal.    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 She went to [facility] about 3 weeks ago, and she was diagnosed with an infected pilonidal cyst.  She was given p.o. antibiotics. She finished the whole course of p.o. antibiotics, but she continued to have some discomfort and the bulge. So, she presented to my office yesterday for a consultation. When I saw her yesterday, she was clinically not febrile, not in severe pain but there was clearly large hard area consistent with either a chronic abscess or abscess itself. So at this point, I could not clearly find a pilonidal cyst, but I was very suspicious that it is the diagnosis. So, we decided to take the patient today for surgery for exploration and incision and drainage and possible excision of pilonidal cyst.    PROCEDURE:  The patient was brought to operating room. Anesthesia was induced. The patient was placed in prone position. Unfortunately, after placing the patient in prone position, there was some difficulty in ventilating the patient which took about 15 minutes to adjust the ET tube and the position of the patient.   During that time, the oxygen saturations were always in the 99 percentile. After this was fixed, then I retracted the buttocks on both side with the tape and then prepping was performed with ChloraPrep. A time-out was performed. At the beginning, I noticed that there is a small skin opening consistent with the pilonidal cyst that I was unable to see in the office yesterday. I tried to probe this one, but it did not go anywhere. It did not go to the abscesses that were superior to it. So, I decided to do an elliptical incision, around the skin, around the skin opening. I carried it to the skin subcutaneous tissue, deep all the way to the subcutaneous fat. As all the fat looked normal, there was no sign of infection, so I decided not to go any deeper or lateral.  So I excised this and I send it for pathology. However at the lower side of the excision, I was surprised by what looked to be foreign body, with possibly a mesh which was very strange, was not sure why it should be in this area. So I decided to send it for permanent pathology and I was not sure if it is just from the chronic inflammation or some foreign body. So at this point, attention was made to the three abscesses. The smaller one was very close to the skin incision, so I was able to probe it with a Namita Lary and I was able to drain a little bit of yellowish secretion from it into the opening of the pilonidal cyst itself. This was clearly connected. However, the other 2 large abscesses were superior. They were about 5 cm from the upper part of the incision. So, I did not want to make a big incision, so I decided to make another incision in between the 2 abscesses superiorly in the midline and I was able to reach each one of them from each side by probing them with the finger, and I was able to un-loculate it and drain all of it on both sides.   So, at this point, after connecting them together with smaller skin incision, I decided to of course keep the wound open and packed because of the infection and this was done with the Kerlix and dry dressing. 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       Leonie Echeverria MD      [id number]  D:  06/25/2019 10:09  T:  06/25/2019 13:21  JOB #:  [id number]

## 2019-06-25 NOTE — PROGRESS NOTES
Date of Surgery Update:  Nikolas Hopson was seen and examined. History and physical has been reviewed. The patient has been examined. There have been no significant clinical changes since the completion of the originally dated History and Physical. Will proceed with excision of pilonidal cyst possible incision and drainage of lower back/buttock abscesses.     Signed By: Shahnaz Verma MD     June 25, 2019 7:20 AM

## 2019-06-25 NOTE — BRIEF OP NOTE
BRIEF OPERATIVE NOTE    Date of Procedure: 6/25/2019   Preoperative Diagnosis: pilonidal cyst l05.91  Postoperative Diagnosis: pilonidal cyst l05.91    Procedure(s):  1. Excision of PILONIDAL CYST  2. Incision and drainage of 2 abscesses   Surgeon(s) and Role:     * Jay Rosales MD - Primary  Surgical Staff:  Circ-1: Bebeto Miranda RN  Circ-2: Radha Lombardi  Scrub Tech-1: Irene Freire  Surg Asst-1: Richardll Labrum  Event Time In Time Out   Incision Start 3251    Incision Close 3419      Anesthesia: General   Estimated Blood Loss: Minimal.  Specimens:   ID Type Source Tests Collected by Time Destination   1 : PILONIDAL CYST Preservative   Jay Rosales MD 6/25/2019 1348 Pathology   2 : FOREIGN BODY Preservative   Jay Rosales MD 6/25/2019 2224 Pathology      Findings: Pilonidal cyst. 3 large abscesses. Complications: None.    Implants: * No implants in log *

## 2019-06-28 NOTE — PROGRESS NOTES
Eloise Pedroza    CC: Pilonidal cyst    HPI:     Pilonidal cyst:  -Diagnosed at urgent care  -This is been there for a while but slowly growing in size  -Given a 10-day regimen of antibiotics  -Area is now less tender and red      ROS: Positive items marked in RED  CON: fever, chills  Cardiovascular: palpitations, CP  Resp: SOB, cough  GI: nausea, vomiting, diarrhea  : dysuria, hematuria      Past Medical History:   Diagnosis Date    Anxiety     Asthma     Environmental allergies     GERD (gastroesophageal reflux disease)     HLD (hyperlipidemia)     HSV-2 infection     Nausea & vomiting     MILD NAUSEA ONLY    PCOS (polycystic ovarian syndrome)     Prediabetes     Vitamin D deficiency        Past Surgical History:   Procedure Laterality Date    HX BREAST LUMPECTOMY Left 2013    HX KNEE ARTHROSCOPY Right 2010    HX WISDOM TEETH EXTRACTION  2003       Family History   Problem Relation Age of Onset    Asthma Mother     GERD Mother     Substance Abuse Mother     Diabetes Father     Sleep Apnea Father     Hypertension Father     Elevated Lipids Father     Alcohol abuse Father     GERD Brother     Cancer Paternal Grandmother         throat 2/2 smoking       Social History     Socioeconomic History    Marital status: LEGALLY      Spouse name: Not on file    Number of children: Not on file    Years of education: Not on file    Highest education level: Not on file   Occupational History    Occupation: RN   Tobacco Use    Smoking status: Never Smoker    Smokeless tobacco: Never Used   Substance and Sexual Activity    Alcohol use: Yes     Comment: 1 glass of wine a month    Drug use: No    Sexual activity: Never     Partners: Male       Allergies   Allergen Reactions    Latex Atopic Dermatitis    Nubain [Nalbuphine] Hives    Sulfa (Sulfonamide Antibiotics) Hives    Iodine Hives    Shellfish Derived Hives         Current Outpatient Medications:     therapeutic multivitamin (THEREMS) tablet, Take 1 Tab by mouth., Disp: , Rfl:     metFORMIN (GLUMETZA ER) 500 mg TG24 24 hour tablet, Take 1,000 mg by mouth two (2) times a day., Disp: 120 Tab, Rfl: 6    montelukast (SINGULAIR) 10 mg tablet, Take 1 Tab by mouth daily. , Disp: 90 Tab, Rfl: 1    hydrOXYzine HCl (ATARAX) 50 mg tablet, Take 1 Tab by mouth three (3) times daily as needed for Anxiety. , Disp: 90 Tab, Rfl: 1    fluticasone-vilanterol (BREO ELLIPTA) 200-25 mcg/dose inhaler, Take 1 Puff by inhalation daily. Indications: MAINTENANCE THERAPY FOR ASTHMA, Disp: 1 Inhaler, Rfl: 3    valACYclovir (VALTREX) 1 gram tablet, Take 1 Tab by mouth daily. (Patient taking differently: Take 1,000 mg by mouth daily as needed.), Disp: 90 Tab, Rfl: 1    cholecalciferol (VITAMIN D3) 1,000 unit tablet, Take 2 Tabs by mouth daily. , Disp: 180 Tab, Rfl: 0    EPIPEN 2-BOB 0.3 mg/0.3 mL injection, INJECT 1 PEN INTO LATERAL THIGH FOR SYMPTOMS OF ANAPHYLAXIS., Disp: , Rfl: 0    albuterol sulfate (PROAIR RESPICLICK) 90 mcg/actuation aepb, Take 2 Puffs by inhalation every four (4) hours as needed. , Disp: 1 Inhaler, Rfl: 12    cetirizine (ZYRTEC) 10 mg tablet, Take  by mouth., Disp: , Rfl:     famotidine (PEPCID) 20 mg tablet, Take 20 mg by mouth as needed. , Disp: , Rfl:     oxyCODONE-acetaminophen (PERCOCET) 5-325 mg per tablet, Take 1 Tab by mouth every four (4) hours as needed for Pain for up to 3 days. Max Daily Amount: 6 Tabs., Disp: 24 Tab, Rfl: 0    cephalexin (KEFLEX) 750 mg capsule, Take 1 Cap by mouth three (3) times daily. , Disp: 30 Cap, Rfl: 0    Physical Exam:      /88   Pulse 77   Temp 98.3 °F (36.8 °C) (Oral)   Resp 18   Ht 5' 8\" (1.727 m)   Wt 262 lb 6.4 oz (119 kg)   LMP 06/19/2019   SpO2 98%   BMI 39.90 kg/m²     General: Obese habitus, NAD, conversant  Eyes: sclera clear bilaterally, no discharge noted, eyelids normal in appearance  HENT: NCAT  Lungs: CTAB, normal respiratory effort and rate  CV: RRR, no MRGs  ABD: soft, non-tender, non-distended, normal bowel sounds  Skin: Skin around the top of crease of buttocks erythematous and indurated.   Psych: alert and oriented to person, place and situation, normal affect  Neuro: speech normal, moving all extremities, gait normal      Assessment/Plan     Pilonidal cyst:  -Referred to surgery for evaluation  -Handout given on pilonidal abscess care  -Follow-up as needed symptoms worsen or fail to improve        Veronica Cruz MD  6/28/2019, 6:03 PM

## 2019-07-01 ENCOUNTER — OFFICE VISIT (OUTPATIENT)
Dept: SURGERY | Age: 34
End: 2019-07-01

## 2019-07-01 VITALS
BODY MASS INDEX: 40.62 KG/M2 | TEMPERATURE: 97.9 F | HEART RATE: 80 BPM | HEIGHT: 68 IN | DIASTOLIC BLOOD PRESSURE: 75 MMHG | WEIGHT: 268 LBS | OXYGEN SATURATION: 98 % | SYSTOLIC BLOOD PRESSURE: 135 MMHG

## 2019-07-01 DIAGNOSIS — L05.91 PILONIDAL CYST: ICD-10-CM

## 2019-07-01 DIAGNOSIS — Z09 POSTOPERATIVE EXAMINATION: Primary | ICD-10-CM

## 2019-07-01 NOTE — PATIENT INSTRUCTIONS
If you have any questions or concerns about today's appointment, the verbal and/or written instructions you were given for follow up care, please call our office at [phone].     [facility] - [address]    [phone] office  [phone]

## 2019-07-01 NOTE — PROGRESS NOTES
Kriss Quispe is a 26 y.o. female (: 1985) presenting to address:    Chief Complaint   Patient presents with    Surgical Follow-up     Pilonidal cyst        Medication list and allergies have been reviewed with Kriss  and updated as of today's date. I have gone over all Medical, Surgical and Social History with Kriss Quispe and updated/added the information accordingly. 1. Have you been to the ER, Urgent Care or Hospitalized since your last visit? NO      2. Have you followed up with your PCP or any other Physicians since your procedure/ last office visit?    NO

## 2019-07-01 NOTE — PROGRESS NOTES
Patient seen and examined. She is doing well. She stated that she is feeling better and she feels that the hard area is less. She has been doing wet-to-dry dressing on a daily basis. I removed the dressing and the packing. Both wounds look healthy with good granulation tissue at the base with no evidence of erythema or drainage. I packed with gauze. I instructed the patient to do a wet-to-dry dressing daily and to do it after taking a shower so the gauze can be wet. Follow-up with me in 1 week.

## 2019-07-03 ENCOUNTER — TELEPHONE (OUTPATIENT)
Dept: SURGERY | Age: 34
End: 2019-07-03

## 2019-07-03 ENCOUNTER — OFFICE VISIT (OUTPATIENT)
Dept: SURGERY | Age: 34
End: 2019-07-03

## 2019-07-03 VITALS
DIASTOLIC BLOOD PRESSURE: 85 MMHG | RESPIRATION RATE: 20 BRPM | HEART RATE: 96 BPM | TEMPERATURE: 98.7 F | BODY MASS INDEX: 40.01 KG/M2 | HEIGHT: 68 IN | WEIGHT: 264 LBS | SYSTOLIC BLOOD PRESSURE: 136 MMHG

## 2019-07-03 DIAGNOSIS — L05.91 PILONIDAL CYST: ICD-10-CM

## 2019-07-03 DIAGNOSIS — Z09 POSTOPERATIVE EXAMINATION: Primary | ICD-10-CM

## 2019-07-03 NOTE — PROGRESS NOTES
Christy Durand is a 26 y.o. female who presents today with   Chief Complaint   Patient presents with    Surgical Follow-up     Pt S/p Pilonidal cyst excision 6/25/2019                1. Have you been to the ER, urgent care clinic since your last visit? Hospitalized since your last visit? No    2. Have you seen or consulted any other health care providers outside of the [address] [place] [address] since your last visit? Include any pap smears or colon screening.  No

## 2019-07-03 NOTE — PROGRESS NOTES
Patient seen and examined. She has stated that she is doing relatively well however she is still complaining of pain especially on the left upper side. She said that she did not have this pain few days ago but it just happened recently. She is also stating that there is a slight increase in the amount of drainage. She is still doing the wet-to-dry dressing on a daily basis only once a day she stated that the last time she change it was last night. She denies any fever or chills and she is on her last day of antibiotics. On exam I removed the packing, and there is some drainage on both gauze but minimal. The lower wound is open with very good granulation tissue at the base. The upper wound is also open with a good granulation tissue and the area of hardening is still felt on both side. It is slightly tender but there is no overlying skin erythema or fluctuation. I cleaned the wound and I tried to probe it but there is no tunneling. I explained to the patient that we would like to observe it for now and to continue with the wet-to-dry packing on a daily basis at least once a day also I told her to finish her course of antibiotic and then to follow-up with me in 1 to 2 weeks or of course earlier if there is any fever or chills or any increase in the drainage or her pain does not get better because she is at risk of having another collection and she may need another surgical drainage. 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 The patient do understand the situation and she will follow-up with me in 1 to 2 weeks.

## 2019-07-03 NOTE — PATIENT INSTRUCTIONS
If you have and questions or concerns about today's appointment, the verbal and/or written instructions you were given for follow up care, please call our office at [phone].      [facility] - [address]  Office: [phone]   Fax: [phone]

## 2019-08-08 ENCOUNTER — TELEPHONE (OUTPATIENT)
Dept: SURGERY | Age: 34
End: 2019-08-08

## 2019-08-08 NOTE — TELEPHONE ENCOUNTER
Patient sent in 2 emails. One in regard to her wound care and one in regard to her pathology report. Please see messages from 08/06/19. Called and spoke to patient as I felt this would be more appropriate and easier to discuss over the phone. Patient stated that she did still have 2 small openings that she was packing and that one was \"longer\" than the other. Notified patient that she should still be packing the wounds as long as she is able and that we would like to get her in for a follow up apt. So that he can take a look at them himself and make a decision from there. Patient was offered an appointment for tomorrow but stated that she would not be able to come in then. Patient given an appointment for Monday at 8:45 am. Patient also stated that she was concerned about her pathology report as one specimen stated \"pilonidal cyst\" and one stated \"foreign body\". I notified patient that I am not exactly sure why they labeled it foreign body but that it looks like the report showed that it was found to be soft tissue. It was removed because it was grey in color and not a typical looking tissue. Patient stated that she was uncomfortable with that response because of the use of the word acellular. Stated that it was soft tissue that they typically would not use the word acellular. I notified patient that any additional concerns that she may have would probably be best answered by Dr. Willie Moya as he is the one who was able to see and remove the specimen. Patient gave verbal understanding and stated that she apologized if she came across a certain way. I notified patient that she was fine and I understood her concerns. Patient thanked me for the call. Patient again notified of her appointment time and call was ended mutually. Closing encounter.

## 2019-08-12 ENCOUNTER — OFFICE VISIT (OUTPATIENT)
Dept: SURGERY | Age: 34
End: 2019-08-12

## 2019-08-12 VITALS
HEART RATE: 68 BPM | OXYGEN SATURATION: 96 % | HEIGHT: 68 IN | SYSTOLIC BLOOD PRESSURE: 121 MMHG | WEIGHT: 278 LBS | DIASTOLIC BLOOD PRESSURE: 79 MMHG | TEMPERATURE: 98 F | BODY MASS INDEX: 42.13 KG/M2

## 2019-08-12 DIAGNOSIS — Z09 POSTOPERATIVE EXAMINATION: Primary | ICD-10-CM

## 2019-08-12 NOTE — PROGRESS NOTES
Terry Rebolledo is a 26 y.o. female (: 1985) presenting to address:    Chief Complaint   Patient presents with    Surgical Follow-up     Wound check, Discuss path report       Medication list and allergies have been reviewed with Terry Rebolledo and updated as of today's date. I have gone over all Medical, Surgical and Social History with Terry Rebolledo and updated/added the information accordingly. 1. Have you been to the ER, Urgent Care or Hospitalized since your last visit? NO      2. Have you followed up with your PCP or any other Physicians since your procedure/ last office visit?    NO

## 2019-08-12 NOTE — PROGRESS NOTES
The patient is here today to discuss her pathology. 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 She had called and spoke with the office and stated that she was concerned that on her pathology showed a possible foreign body. I reviewed the pathology with her and I reassured her that the final pathology showed a possible soft tissue masses and there is no evidence of foreign bodies. Otherwise the patient is doing well and her wound is healing well. She will follow-up with me as needed.

## 2019-08-22 DIAGNOSIS — J45.909 ASTHMA, UNSPECIFIED ASTHMA SEVERITY, UNSPECIFIED WHETHER COMPLICATED, UNSPECIFIED WHETHER PERSISTENT: Primary | ICD-10-CM

## 2019-08-22 RX ORDER — FLUTICASONE PROPIONATE AND SALMETEROL 250; 50 UG/1; UG/1
1 POWDER RESPIRATORY (INHALATION) EVERY 12 HOURS
Qty: 1 INHALER | Refills: 6 | Status: SHIPPED | OUTPATIENT
Start: 2019-08-22 | End: 2021-04-14

## 2019-09-13 DIAGNOSIS — F41.9 ANXIETY: ICD-10-CM

## 2019-09-20 RX ORDER — HYDROXYZINE 50 MG/1
TABLET, FILM COATED ORAL
Qty: 90 TAB | Refills: 1 | Status: SHIPPED | OUTPATIENT
Start: 2019-09-20 | End: 2021-04-24

## 2021-03-04 ENCOUNTER — OFFICE VISIT (OUTPATIENT)
Dept: SURGERY | Age: 36
End: 2021-03-04
Payer: COMMERCIAL

## 2021-03-04 VITALS
HEART RATE: 78 BPM | HEIGHT: 68 IN | SYSTOLIC BLOOD PRESSURE: 140 MMHG | BODY MASS INDEX: 42.13 KG/M2 | TEMPERATURE: 97 F | WEIGHT: 278 LBS | OXYGEN SATURATION: 98 % | DIASTOLIC BLOOD PRESSURE: 94 MMHG

## 2021-03-04 DIAGNOSIS — E66.01 MORBID OBESITY WITH BMI OF 40.0-44.9, ADULT (HCC): Primary | ICD-10-CM

## 2021-03-04 PROCEDURE — 99205 OFFICE O/P NEW HI 60 MIN: CPT | Performed by: SURGERY

## 2021-03-04 RX ORDER — SPIRONOLACTONE 50 MG/1
TABLET, FILM COATED ORAL DAILY
COMMUNITY
End: 2021-04-14

## 2021-03-04 RX ORDER — FLUVOXAMINE MALEATE 100 MG/1
TABLET, COATED ORAL
COMMUNITY
End: 2021-09-13

## 2021-03-04 RX ORDER — METFORMIN HYDROCHLORIDE 500 MG/1
500 TABLET ORAL 2 TIMES DAILY WITH MEALS
COMMUNITY
End: 2021-04-14

## 2021-03-04 RX ORDER — ESOMEPRAZOLE MAGNESIUM 40 MG/1
CAPSULE, DELAYED RELEASE ORAL DAILY
COMMUNITY
End: 2021-06-24

## 2021-03-04 NOTE — PROGRESS NOTES
Dia Kim is a 38 y.o. female (: 1985) presenting to address:    Chief Complaint   Patient presents with    New Patient     GBP consult       Medication list and allergies have been reviewed with Dia Kim and updated as of today's date. I have gone over all Medical, Surgical and Social History with Dia Kim and updated/added the information accordingly.

## 2021-03-04 NOTE — PROGRESS NOTES
Consult    Patient: Alin Durbin MRN: [id number]  SSN: [id number]    YOB: 1985  Age: 38 y.o. Sex: female      Initial  Consultation for Bariatric Surgery     Alin Durbin is a 40-year-old black female who presents for discussion of surgical options available for the definitive management of her clinically severe obesity. Onset obesity: Childhood  Weight at age 23: 165 pounds on a 5 foot 8 inch frame. Maximum weight: 285 pounds on a 5 foot 8 inch frame in 2020  Pattern/progression of weight gain: Slowly progressive interrupted by dietary weight loss followed by regain of the lost weight as well as additional weight thus exhibiting yoyo effect after maximum weight of 285 pounds occurring in 2020  Max medical weight loss attempts: Multiple unsupervised and supervised weight loss trials of maximal loss occurring in 2019 losing 40 pounds over 5 months  Comorbidities: Prediabetes, untreated hypercholesterolemia, gastroesophageal reflux disease, reactive airway disease, stricture incontinence, clinical obstructive sleep apnea, polycystic ovarian syndrome, and 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 BMI: 42. Ideal body weight: 143. Excess body weight: 135  Estimated postsurgical weight loss: 108  Postsurgical goal weight: 170  Allergies: Latex Nubain sulfa iodine, shellfish  Current medications: See medication list  Past medical history:  1. Clinically severe obesity with body mass index of 42 with obesity related comorbidities of prediabetes, untreated hypercholesterolemia, Gastrosoft reflux disease, reactive airway disease, stricture incontinence, clinical obstructive sleep apnea, weight related arthropathyknees  2. Allergic rhinitis  3. OCD  4. Transaminitis  5. Depression/anxiety  6. Vitamin D deficiency  7. Herpes simplex virus type II  Past surgical history:  1. [place] tooth extractions 2003  2. Diagnostic laparoscopy 2004  3.   Right knee arthroscopy/meniscectomy 2008  4. Left breast biopsy 2012  5. Laparoscopic cholecystectomy 2020  6. Pilonidal cystectomy 2019  Social history: Patient denies utilization of tobacco and alcohol  Family history: Mother 60reactive airway disease, gastroesophageal reflux disease  Father 61clinically severe obesity, hypertension, hypercholesterolemia, adult-onset diabetes mellitus, obstructive sleep apnea, coronary disease  Brother 39gastroesophageal reflux disease    Allergies   Allergen Reactions    Latex Atopic Dermatitis    Nubain [Nalbuphine] Hives    Sulfa (Sulfonamide Antibiotics) Hives    Iodine Hives    Shellfish Derived Hives       Current Outpatient Medications on File Prior to Visit   Medication Sig Dispense Refill    fluvoxaMINE (LUVOX) 100 mg tablet       metFORMIN (GLUCOPHAGE) 500 mg tablet Take 500 mg by mouth two (2) times daily (with meals).  esomeprazole (NexIUM) 40 mg capsule Take  by mouth daily.  spironolactone (Aldactone) 50 mg tablet Take  by mouth daily.  therapeutic multivitamin (THEREMS) tablet Take 1 Tab by mouth.  montelukast (SINGULAIR) 10 mg tablet Take 1 Tab by mouth daily. 90 Tab 1    cholecalciferol (VITAMIN D3) 1,000 unit tablet Take 2 Tabs by mouth daily. 180 Tab 0    EPIPEN 2-BOB 0.3 mg/0.3 mL injection INJECT 1 PEN INTO LATERAL THIGH FOR SYMPTOMS OF ANAPHYLAXIS.  0    albuterol sulfate (PROAIR RESPICLICK) 90 mcg/actuation aepb Take 2 Puffs by inhalation every four (4) hours as needed. 1 Inhaler 12    cetirizine (ZYRTEC) 10 mg tablet Take  by mouth.  hydrOXYzine HCl (ATARAX) 50 mg tablet TAKE 1 TABLET BY MOUTH 3 TIMES A DAY AS NEEDED FOR ANXIETY 90 Tab 1    fluticasone propion-salmeterol (ADVAIR/WIXELA) 250-50 mcg/dose diskus inhaler Take 1 Puff by inhalation every twelve (12) hours. 1 Inhaler 6    cephalexin (KEFLEX) 750 mg capsule Take 1 Cap by mouth three (3) times daily.  30 Cap 0    metFORMIN (GLUMETZA ER) 500 mg TG24 24 hour tablet Take 1,000 mg by mouth two (2) times a day. 120 Tab 6    valACYclovir (VALTREX) 1 gram tablet Take 1 Tab by mouth daily. (Patient taking differently: Take 1,000 mg by mouth daily as needed.) 90 Tab 1    famotidine (PEPCID) 20 mg tablet Take 20 mg by mouth as needed. No current facility-administered medications on file prior to visit. Past Medical History:   Diagnosis Date    Anxiety     Asthma     Environmental allergies     GERD (gastroesophageal reflux disease)     HLD (hyperlipidemia)     HSV-2 infection     Nausea & vomiting     MILD NAUSEA ONLY    OCD (obsessive compulsive disorder)     PCOS (polycystic ovarian syndrome)     Prediabetes     Vitamin D deficiency        Past Surgical History:   Procedure Laterality Date    HX BREAST LUMPECTOMY Left 2013    HX CHOLECYSTECTOMY      HX KNEE ARTHROSCOPY Right 2010    HX OTHER SURGICAL  06/25/2019    excsion of pilonida cyst     HX WISDOM TEETH EXTRACTION  2003       Social History     Tobacco Use    Smoking status: Never Smoker    Smokeless tobacco: Never Used   Substance Use Topics    Alcohol use: Not Currently     Comment: 1 glass of wine a month    Drug use: No       Family History   Problem Relation Age of Onset    Asthma Mother     GERD Mother     Substance Abuse Mother     Diabetes Father     Sleep Apnea Father     Hypertension Father     Elevated Lipids Father     Alcohol abuse Father     GERD Brother     Cancer Paternal Grandmother         throat 2/2 smoking         Review of Systems:      General: Denies fevers, chills, night sweats, fatigue, weight loss, or weight gain.     HEENT: Denies changes in auditory or visual acuity, recurrent pharyngitis, epistaxis, chronic rhinorrhea, vertigo    Respiratory: Denies increasing shortness of breath, productive cough, hemoptysis    Cardiac: Denies known history of cardiac disease, heart murmur, palpitations    GI: Denies dysphagia, recurrent emesis, hematemesis, changes in bowel habits, hematochezia, melena    : Denies hematuria frequency urgency dysuria    Musculoskeletal: Denies fractures, dislocations    Neurologic: Denies history of CVA, paralysis paresthesias, recurrent cephalgia, seizures    Endocrine: Denies polyuria, polydipsia, polyphagia, heat and cold intolerance    Lymph/heme: Denies a history of malignancy, anemia, bruising, blood transfusions    Integumentary: Negative for dermatitis         Physical Exam    Visit Vitals  BP (!) 140/94 (BP 1 Location: Right arm, BP Patient Position: Sitting)   Pulse 78   Temp 97 °F (36.1 °C)   Ht 5' 8\" (1.727 m)   Wt 126.1 kg (278 lb)   LMP 02/09/2021 (Exact Date)   SpO2 98%   BMI 42.27 kg/m²       Nursing note reviewed. General: Clinically severely obese in no acute distress, nontoxic in appearance. Head: Normocephalic, atraumatic  Mouth: Clear, no overt lesions, oral mucosa is pink and moist.  Neck: Supple, no masses, no adenopathy or carotid bruits, trachea midline  Resp: Clear to auscultation bilaterally, no wheezing, rhonchi, or rales, excursions normal and symmetrical.  Cardio: Regular rate and rhythm, no murmurs, clicks, gallops, or rubs. Abdomen: Obese, soft, nontender, nondistended, normoactive bowel sounds, no hernias. Extremities: Warm, well perfused, no tenderness or swelling, normal gait/station, without edema or varicosities  Neuro: Sensation and strength grossly intact and symmetrical.  Psych: Alert and oriented to person, place, and time. Impression/Plan:      76-year-old black female with a body mass index of 42 with obesity comorbidities of prediabetes, untreated hypercholesterolemia gastroesophageal reflux disease, reactive airway disease, stress incontinence, clinical obstructive sleep apnea, polycystic ovarian syndrome, weight related arthropathyknees who would benefit from bariatric surgery. We have had an extensive discussion with regard to the risks, benefits and likely outcomes of the operation. We've discussed the restrictive and malabsorptive nature of the gastric bypass and compared and contrasted with the sleeve gastrectomy. The patient understands the likelihood of losing approximately 80% of their excess weight in 12 to 18 months. The patient also understands the risks including but not limited to bleeding, infection, need for reoperation, ulcers, leaks and strictures, bowel obstruction secondary to adhesions and internal hernias, DVT, PE, heart attack, stroke, and death. Patient also understands risks of inadequate weight loss, excess weight loss, vitamin insufficiency, protein malnutrition, excess skin, and loss of hair. We have reviewed the components of a successful postoperative course including requirement for a high protein, low carbohydrate diet, 60 oz a day of zero calorie liquids, daily vitamin supplementation, daily exercise, regular follow-up, and participation in support groups.  At this time we will enroll the patient in our bariatric program, undertake routine laboratory evaluation, chest X-ray, EKG, possible UGI and evaluation by  nutritionist as well as psychologist and pending their satisfactory completion of the preop evaluation, plan to pursue laparoscopic potentially open gastric bypass to achieve definitive durable weight loss on a personal level with expected resolution of obesity related comorbidities

## 2021-03-11 ENCOUNTER — HOSPITAL ENCOUNTER (OUTPATIENT)
Dept: BARIATRICS/WEIGHT MGMT | Age: 36
Discharge: HOME | End: 2021-03-11

## 2021-03-11 NOTE — PROGRESS NOTES
[address] [facility]  [id number] [place] [address]/[address], [address]    Patient's Name: Bright Hall   Age: 38 y.o. YOB: 1985   Sex: female    Date:   3/11/2021    Session: 1 of  3  Surgeon:  Celeste Sorto    Height: 68\" Weight:    276      Lbs. BMI: 41.9    Starting Weight: 278 lbs     Overall Pounds Lost: 2   Overall Pounds Gained: 0      Do you smoke? Patient does not smoke    Alcohol intake: Patient does not drink  Number of drinks at a time:  0  Number of times a week: 0    Class Guidelines    Guidelines are reviewed with patient at the start of every class. 1. Patient understands that weight loss trial classes must be consecutive. Patient understands if they miss a class, it is their responsibility to contact me to reschedule class. I will reach out to patient after their first no show. 2.  Patient understands the expectations that weight maintenance/weight loss is expected during the classes. Failure to demonstrate changes may result in one extra month of weight loss trial, followed by going back to see the surgeon. 3. Patient is also instructed to be doing their labs, blood work, psych visit, support group and any other test that the surgeon has used while they are working on their weight loss trial.    Changes Made: stopped sodas; switching out bedtime ice cream for yogurt      Dietary Instruction    During today's class, we continued to focus on the key diet principles. Patient was instructed to follow a low carbohydrate diet, focusing on meat and vegetables. Patient was instructed to stop liquid calories and aim for 64 ounces of water per day. We focused on focusing in on bigger problem areas to start making changes to, such as reducing fast food intake, reducing carbonated beverages/soda intake, decreasing carbohydrates intake daily, etc. We reviewed protein shakes and high protein yogurts to chose, as well.  Patient was educated on good breakfast ideas and high protein snacks. I also reviewed with patient the vitamins that they will need to take post op. Patient will hear this information again at pre op class prior to surgery, but I felt it was important to prepare them now. Patient will be taking 2 multivitamin complete per day, 100 mg of Vitamin B1, 5000 IU of Vitamin D3, 1000 mcg Vitamin B12, 1500 mg of calcium citrate. Patient's diet habits include: Eating 3 meals daily, usually rice and some type of meat. Carb sources come from fruit, ice cream, yogurt, chips, and rice. Snacking 1-2x/day on chips, ice cream, yogurt, or fruit. Struggling to let go of ice cream. Drinking 32 oz of water and 16-24 oz of tea or juice daily. Physical Activity/Exercise    Comments:     Currently for exercise, patient is not currently doing anything. We talked about activities for patient to do, including walking, swimming, or chair exercises, as well as some additional steps to take in the day to get extra movement, such as parking further away, walking dog, taking stairs vs elevator, etc. Patient was encouraged to start up an exercise routine and build on it. Behavior Modification  Comments:   Emphasized the importance of eating slowly, not eating and drinking meals at the same time. Discussed Key Behavior principles to start implementing to be successful following surgery, such as, importance of 3 meals daily, keeping a food journal, avoid distractions during meal times, and chewing food thoroughly, as a few examples. Patient's S.M.A.R.T. Goals are:  1. Switch out snacks for healthier alternatives  2. Walk 1-2x/week  3.  Eliminate distractions while eating      George Chambers RD  3/11/2021

## 2021-04-01 ENCOUNTER — HOSPITAL ENCOUNTER (OUTPATIENT)
Dept: WOMENS IMAGING | Age: 36
Discharge: HOME | End: 2021-04-01
Payer: COMMERCIAL

## 2021-04-01 DIAGNOSIS — Z12.31 ENCOUNTER FOR SCREENING MAMMOGRAM FOR BREAST CANCER: ICD-10-CM

## 2021-04-01 PROCEDURE — 77063 BREAST TOMOSYNTHESIS BI: CPT

## 2021-04-08 ENCOUNTER — HOSPITAL ENCOUNTER (OUTPATIENT)
Dept: BARIATRICS/WEIGHT MGMT | Age: 36
Discharge: HOME | End: 2021-04-08

## 2021-04-08 NOTE — PROGRESS NOTES
[facility]  [address] [facility], [address]    Patient's Name: Lord Samayoa   Age: 38 y.o. YOB: 1985   Sex: female    Date:   4/8/2021    Session: 2 of  3  Surgeon:  Reese Pinto    Height: 68\" Weight:    272      Lbs  BMI: 41.3     Previous Month's Weight: 276  Pounds Lost since last month: 4                 Pounds Gained since last month: 0    Starting Weight: 278     Overall Pounds Lost: 6   Overall Pounds Gained: 0      Do you smoke? Pt does not smoke    Alcohol intake: Pt does not drink  Number of drinks at a time:  0  Number of times a week: 0    Class Guidelines    Guidelines are reviewed with patient at the start of every class. 1. Patient understands that weight loss trial classes must be consecutive. Patient understands if they miss a class, it is their responsibility to contact me to reschedule class. I will reach out to patient after their first no show. 2.  Patient understands the expectations that weight maintenance/weight loss is expected during the classes. Failure to demonstrate changes may result in one extra month of weight loss trial, followed by going back to see the surgeon. 3. Patient is also instructed to be doing their labs, blood work, psych visit, support group and any other test that the surgeon has used while they are working on their weight loss trial.    Changes Made: Cut out soda but still drinking sparkling water; switched out evening snack to yogurt; trying different protein shakes      Dietary Instruction    During today's class, we continued to focus on the key diet principles. Patient was instructed to follow a low carbohydrate diet, focusing on meat and vegetables. Patient was instructed to stop liquid calories and aim for 64 ounces of water per day.  We focused on focusing in on bigger problem areas to start making changes to, such as reducing fast food intake, reducing carbonated beverages/soda intake, decreasing carbohydrates intake daily, etc. We reviewed protein shakes and high protein yogurts to chose, as well. Patient was educated on good breakfast ideas and high protein snacks. I also reviewed with patient the post-op diet. Patient will hear this information again at pre op class prior to surgery, but I felt it was important to prepare them now. We covered the 7 day Pre-Op liquid diet prior to surgery, as well as the 7 day post-op liquid diet to get the body adjusted to their new pouch and \"jumpstart\" weight loss. We discussed portion sizes, focus on protein foods for the 1st 6 weeks post-op and approved SF fluids to consume. Patient understanded the importance of following this diet post op as to not regain weight and go back to old habits, and be most successful w/ their weight loss journey. Patient understands that surgery is only a tool in weight loss. Patient's diet habits include: Eating 3 meals daily, consisting of meat or another protein, veggies and 1/4 cup of rice. Biggest portion at meals has now changed to meat and veggies. Carb sources comes from rice or dairy. Snacking 1-2x/day on yogurt. Struggling to let go of carbonated drinks in general. Drinking 32 oz of water daily and 24 oz of diet tea. Physical Activity/Exercise    Comments:     Currently for exercise, patient is walking 1x/week. We talked about activities for patient to do, including walking, swimming, or chair exercises, as well as some additional steps to take in the day to get extra movement, such as parking further away, walking dog, taking stairs vs elevator, etc. Patient was encouraged to start up an exercise routine and build on it. Behavior Modification  Comments:   Emphasized the importance of eating slowly, not eating and drinking meals at the same time.   Discussed Key Behavior principles to start implementing to be successful following surgery, such as, importance of 3 meals daily, keeping a food journal, avoid distractions during meal times, and chewing food thoroughly, taking 20-30 minutes to eat a meal, as a few examples. Patient understands the importance of following through with these behaviors following surgery to aid in long term weight loss. Patient's S.M.A.R.T. Goals are:  1. Cut out carbonated brambila altogether  2. Walk 1-2x/week  3. Try food journaling    Patient has attended a support group meeting.       Bharath Jimenez RD  4/8/2021

## 2021-04-14 ENCOUNTER — OFFICE VISIT (OUTPATIENT)
Dept: CARDIOLOGY CLINIC | Age: 36
End: 2021-04-14
Payer: COMMERCIAL

## 2021-04-14 ENCOUNTER — HOSPITAL ENCOUNTER (OUTPATIENT)
Dept: LAB | Age: 36
Discharge: HOME | End: 2021-04-14

## 2021-04-14 VITALS
HEART RATE: 75 BPM | SYSTOLIC BLOOD PRESSURE: 130 MMHG | DIASTOLIC BLOOD PRESSURE: 86 MMHG | TEMPERATURE: 97.8 F | RESPIRATION RATE: 16 BRPM | HEIGHT: 68 IN | WEIGHT: 281 LBS | BODY MASS INDEX: 42.59 KG/M2 | OXYGEN SATURATION: 99 %

## 2021-04-14 DIAGNOSIS — E78.41 ELEVATED LP(A): ICD-10-CM

## 2021-04-14 DIAGNOSIS — E66.01 MORBID OBESITY WITH BMI OF 40.0-44.9, ADULT (HCC): ICD-10-CM

## 2021-04-14 DIAGNOSIS — E78.5 HYPERLIPIDEMIA, UNSPECIFIED HYPERLIPIDEMIA TYPE: ICD-10-CM

## 2021-04-14 DIAGNOSIS — E28.2 PCOS (POLYCYSTIC OVARIAN SYNDROME): ICD-10-CM

## 2021-04-14 DIAGNOSIS — E66.01 OBESITY, MORBID (HCC): ICD-10-CM

## 2021-04-14 DIAGNOSIS — R73.03 PREDIABETES: ICD-10-CM

## 2021-04-14 DIAGNOSIS — K21.9 GASTROESOPHAGEAL REFLUX DISEASE WITHOUT ESOPHAGITIS: ICD-10-CM

## 2021-04-14 DIAGNOSIS — Z01.818 PREOPERATIVE CLEARANCE: Primary | ICD-10-CM

## 2021-04-14 LAB — XX-LABCORP SPECIMEN COL,LCBCF: NORMAL

## 2021-04-14 PROCEDURE — 93000 ELECTROCARDIOGRAM COMPLETE: CPT | Performed by: INTERNAL MEDICINE

## 2021-04-14 PROCEDURE — 99204 OFFICE O/P NEW MOD 45 MIN: CPT | Performed by: INTERNAL MEDICINE

## 2021-04-14 PROCEDURE — 99001 SPECIMEN HANDLING PT-LAB: CPT

## 2021-04-14 RX ORDER — FLUTICASONE PROPIONATE 44 UG/1
1 AEROSOL, METERED RESPIRATORY (INHALATION) 2 TIMES DAILY
COMMUNITY
Start: 2020-12-28 | End: 2021-06-08 | Stop reason: CLARIF

## 2021-04-14 RX ORDER — SPIRONOLACTONE 50 MG/1
50 TABLET, FILM COATED ORAL DAILY
COMMUNITY
Start: 2021-02-22 | End: 2021-06-24

## 2021-04-14 RX ORDER — MONTELUKAST SODIUM 10 MG/1
10 TABLET ORAL
COMMUNITY
Start: 2020-11-30

## 2021-04-14 RX ORDER — METFORMIN HYDROCHLORIDE 500 MG/1
500 TABLET ORAL 2 TIMES DAILY WITH MEALS
COMMUNITY
Start: 2021-02-22 | End: 2021-06-24

## 2021-04-14 RX ORDER — MELATONIN
5000 DAILY
COMMUNITY
End: 2022-06-14

## 2021-04-14 NOTE — LETTER
4/14/2021 Ms. Angela Serrano [address] [address] To whom it may concern: 
 
Angela Serrano was seen in our office on  April 14, 2021for cardiac evaluation. From a cardiac standpoint she is cleared for her upcoming bariatric procedure. Please feel free to contact our office if you have any questions regarding this patient. Sincerely, Sunil Dyson MD

## 2021-04-14 NOTE — PROGRESS NOTES
Booker Ann presents today for   Chief Complaint   Patient presents with    New Patient     sucl bariatric        Booker Ann preferred language for health care discussion is english/other. Personal Protective Equipment:   Personal Protective Equipment was used including: mask-surgical and hands-gloves. Patient was placed on no precaution(s). Patient was masked. Precautions:   Patient currently on None  Patient currently roomed with door closed    Is someone accompanying this pt? no    Is the patient using any DME equipment during [address]? no    Depression Screening:  3 most recent PHQ Screens 6/24/2019   Little interest or pleasure in doing things Not at all   Feeling down, depressed, irritable, or hopeless Not at all   Total Score PHQ 2 0       Learning Assessment:  Learning Assessment 4/19/2018   PRIMARY LEARNER Patient   HIGHEST LEVEL OF EDUCATION - PRIMARY LEARNER  GRADUATED HIGH SCHOOL OR GED   BARRIERS PRIMARY LEARNER NONE   CO-LEARNER CAREGIVER No   CO-LEARNER NAME -   PRIMARY LANGUAGE ENGLISH   LEARNER PREFERENCE PRIMARY LISTENING   ANSWERED BY patient   RELATIONSHIP SELF       Abuse Screening:  Abuse Screening Questionnaire 6/24/2019   Do you ever feel afraid of your partner? N   Are you in a relationship with someone who physically or mentally threatens you? N   Is it safe for you to go home? Y       Fall Risk  No flowsheet data found. Pt currently taking Anticoagulant therapy? no    Coordination of Care:  1. Have you been to the ER, urgent care clinic since your last visit? Hospitalized since your last visit? no    2. Have you seen or consulted any other health care providers outside of the [address] [place] [address] since your last visit? Include any pap smears or colon screening.  no

## 2021-04-14 NOTE — LETTER
4/14/2021 Ms. Srinivas Harrington [address] [address] To whom it may concern: 
 
Srinivas Harrington was seen in our office on  April 14, 2021 for cardiac evaluation. From a cardiac standpoint she is cleared for her upcoming bariatric procedure. Please feel free to contact our office if you have any questions regarding this patient. Sincerely, Chilo Jarrell MD

## 2021-04-22 LAB
25(OH)D3+25(OH)D2 SERPL-MCNC: 21.3 NG/ML (ref 30–100)
ALBUMIN SERPL-MCNC: 4.2 G/DL (ref 3.8–4.8)
ALBUMIN/GLOB SERPL: 1.4 {RATIO} (ref 1.2–2.2)
ALP SERPL-CCNC: 91 IU/L (ref 39–117)
ALT SERPL-CCNC: 37 IU/L (ref 0–32)
APPEARANCE UR: ABNORMAL
AST SERPL-CCNC: 21 IU/L (ref 0–40)
BACTERIA #/AREA URNS HPF: ABNORMAL /[HPF]
BILIRUB SERPL-MCNC: 0.2 MG/DL (ref 0–1.2)
BILIRUB UR QL STRIP: NEGATIVE
BUN SERPL-MCNC: 9 MG/DL (ref 6–20)
BUN/CREAT SERPL: 15 (ref 9–23)
CALCIUM SERPL-MCNC: 9.3 MG/DL (ref 8.7–10.2)
CASTS URNS QL MICRO: ABNORMAL /LPF
CHLORIDE SERPL-SCNC: 101 MMOL/L (ref 96–106)
CHOLEST SERPL-MCNC: 207 MG/DL (ref 100–199)
CO2 SERPL-SCNC: 27 MMOL/L (ref 20–29)
COLOR UR: YELLOW
CREAT SERPL-MCNC: 0.62 MG/DL (ref 0.57–1)
CRYSTALS URNS MICRO: ABNORMAL
EPI CELLS #/AREA URNS HPF: ABNORMAL /HPF (ref 0–10)
ERYTHROCYTE [DISTWIDTH] IN BLOOD BY AUTOMATED COUNT: 14.1 % (ref 11.7–15.4)
FERRITIN SERPL-MCNC: 48 NG/ML (ref 15–150)
FOLATE SERPL-MCNC: 11.9 NG/ML
GLOBULIN SER CALC-MCNC: 2.9 G/DL (ref 1.5–4.5)
GLUCOSE SERPL-MCNC: 74 MG/DL (ref 65–99)
GLUCOSE UR QL: NEGATIVE
HCT VFR BLD AUTO: 37.7 % (ref 34–46.6)
HDLC SERPL-MCNC: 67 MG/DL
HGB BLD-MCNC: 12.5 G/DL (ref 11.1–15.9)
HGB UR QL STRIP: NEGATIVE
IRON SERPL-MCNC: 66 UG/DL (ref 27–159)
KETONES UR QL STRIP: ABNORMAL
LDLC SERPL CALC-MCNC: 127 MG/DL (ref 0–99)
LEUKOCYTE ESTERASE UR QL STRIP: NEGATIVE
MCH RBC QN AUTO: 29.5 PG (ref 26.6–33)
MCHC RBC AUTO-ENTMCNC: 33.2 G/DL (ref 31.5–35.7)
MCV RBC AUTO: 89 FL (ref 79–97)
MICRO URNS: ABNORMAL
MICRO URNS: ABNORMAL
NITRITE UR QL STRIP: NEGATIVE
PH UR STRIP: 6 [PH] (ref 5–7.5)
PLATELET # BLD AUTO: 290 X10E3/UL (ref 150–450)
POTASSIUM SERPL-SCNC: 4 MMOL/L (ref 3.5–5.2)
PROT SERPL-MCNC: 7.1 G/DL (ref 6–8.5)
PROT UR QL STRIP: NEGATIVE
RBC # BLD AUTO: 4.24 X10E6/UL (ref 3.77–5.28)
RBC #/AREA URNS HPF: ABNORMAL /HPF (ref 0–2)
SODIUM SERPL-SCNC: 140 MMOL/L (ref 134–144)
SP GR UR: 1.03 (ref 1–1.03)
TRIGL SERPL-MCNC: 72 MG/DL (ref 0–149)
TSH SERPL DL<=0.005 MIU/L-ACNC: 2.76 UIU/ML (ref 0.45–4.5)
UNIDENT CRYS URNS QL MICRO: PRESENT
UREA BREATH TEST QL: NEGATIVE
UROBILINOGEN UR STRIP-MCNC: 1 MG/DL (ref 0.2–1)
VIT B1 BLD-SCNC: 109.4 NMOL/L (ref 66.5–200)
VIT B12 SERPL-MCNC: 634 PG/ML (ref 232–1245)
VLDLC SERPL CALC-MCNC: 13 MG/DL (ref 5–40)
WBC # BLD AUTO: 7.2 X10E3/UL (ref 3.4–10.8)
WBC #/AREA URNS HPF: ABNORMAL /HPF (ref 0–5)

## 2021-04-24 PROBLEM — Z01.818 PREOPERATIVE CLEARANCE: Status: ACTIVE | Noted: 2021-04-24

## 2021-04-24 NOTE — PROGRESS NOTES
Subjective:      Star in the office today for cardiac evaluation as she is in preoperative mode for upcoming bariatric surgery. She is a 67-year-old prediabetic woman no known prior cardiac history. She had an echocardiogram done on 11/21/2020 for evaluation of a murmur. She had no significant valvular pathology and her systolic function was normal with an ejection fraction of 60%. She has had no chest pain. She does experience episodic shortness of breath which she attributes to her asthma diagnosed when she was in her early 25s. She has no limiting shortness of breath. She can walk 2 blocks without stopping for dyspnea. She can also walk up a flight of steps carrying a bag of groceries without stopping for dyspnea. She reports some occasional minimal lower extremity swelling. She has had no PND or orthopnea. She has had occasional palpitations, but no lightheadedness, dizziness, near syncope or syncope. Patient's cardiac risk factors are dyslipidemia, pre-diabetes mellitus. Patient Active Problem List    Diagnosis Date Noted    Preoperative clearance 04/24/2021    Depression 05/22/2018    Gastroesophageal reflux disease without esophagitis 05/22/2018    Prediabetes 05/22/2018    Elevated uric acid in blood 05/22/2018    Elevated Lp(a) 05/22/2018    Hyperlipidemia 05/22/2018    PCOS (polycystic ovarian syndrome) 04/19/2018    Obesity, morbid ([address] 04/10/2018     Current Outpatient Medications   Medication Sig Dispense Refill    spironolactone (ALDACTONE) 50 mg tablet Take 50 mg by mouth daily.  montelukast (SINGULAIR) 10 mg tablet Take 10 mg by mouth nightly.  metFORMIN (GLUCOPHAGE) 500 mg tablet Take 500 mg by mouth two (2) times daily (with meals).  fluticasone propionate (Flovent HFA) 44 mcg/actuation inhaler Take 1 Puff by inhalation two (2) times a day.  tiotropium bromide (Spiriva Respimat) 2.5 mcg/actuation inhaler Take 2 Puffs by inhalation daily.       fluvoxaMINE (LUVOX) 100 mg tablet       esomeprazole (NexIUM) 40 mg capsule Take  by mouth daily.  therapeutic multivitamin (THEREMS) tablet Take 1 Tab by mouth.  EPIPEN 2-BOB 0.3 mg/0.3 mL injection INJECT 1 PEN INTO LATERAL THIGH FOR SYMPTOMS OF ANAPHYLAXIS.  0    albuterol sulfate (PROAIR RESPICLICK) 90 mcg/actuation aepb Take 2 Puffs by inhalation every four (4) hours as needed. 1 Inhaler 12    cetirizine (ZYRTEC) 10 mg tablet Take  by mouth.  cholecalciferol (VITAMIN D3) (1000 Units /25 mcg) tablet Take 1,000 Units by mouth daily.  hydrOXYzine HCl (ATARAX) 50 mg tablet TAKE 1 TABLET BY MOUTH 3 TIMES A DAY AS NEEDED FOR ANXIETY 90 Tab 1    cephalexin (KEFLEX) 750 mg capsule Take 1 Cap by mouth three (3) times daily. 30 Cap 0    valACYclovir (VALTREX) 1 gram tablet Take 1 Tab by mouth daily. (Patient taking differently: Take 1,000 mg by mouth daily as needed.) 90 Tab 1    famotidine (PEPCID) 20 mg tablet Take 20 mg by mouth as needed.        Allergies   Allergen Reactions    Latex Atopic Dermatitis    Nubain [Nalbuphine] Hives    Sulfa (Sulfonamide Antibiotics) Hives    Iodine Hives    Shellfish Derived Hives     Past Medical History:   Diagnosis Date    Anxiety     Asthma     Environmental allergies     GERD (gastroesophageal reflux disease)     HLD (hyperlipidemia)     HSV-2 infection     Nausea & vomiting     MILD NAUSEA ONLY    OCD (obsessive compulsive disorder)     PCOS (polycystic ovarian syndrome)     Prediabetes     Vitamin D deficiency      Past Surgical History:   Procedure Laterality Date    HX BREAST LUMPECTOMY Left 2013    HX CHOLECYSTECTOMY      HX KNEE ARTHROSCOPY Right 2010    HX OTHER SURGICAL  06/25/2019    excsion of pilonida cyst     HX WISDOM TEETH EXTRACTION  2003     Family History   Problem Relation Age of Onset    Asthma Mother     GERD Mother     Substance Abuse Mother     Diabetes Father     Sleep Apnea Father     Hypertension Father  Elevated Lipids Father     Alcohol abuse Father     GERD Brother     Cancer Paternal Grandmother         throat 2/2 smoking     Social History     Tobacco Use   Smoking Status Never Smoker   Smokeless Tobacco Never Used          Review of Systems, additional:  Constitutional: negative  Eyes: negative  Respiratory: positive for asthma  Cardiovascular: negative  Gastrointestinal: positive for reflux  Musculoskeletal:negative  Neurological: negative  Behvioral/Psych: negative  Endocrine: negative  ENT: negative    Objective:     Visit Vitals  /86 (BP 1 Location: Left upper arm, BP Patient Position: Sitting, BP Cuff Size: Adult)   Pulse 75   Temp 97.8 °F (36.6 °C)   Resp 16   Ht 5' 8\" (1.727 m)   Wt 281 lb (127.5 kg)   SpO2 99%   BMI 42.73 kg/m²     General:  alert, cooperative, no distress   Chest Wall: inspection normal - no chest wall deformities or tenderness, respiratory effort normal   Lung: clear to auscultation bilaterally   Heart:  normal rate and regular rhythm, S1 and S2 normal, no murmurs noted, no gallops noted, no JVD   Abdomen: soft, non-tender. Bowel sounds normal. No masses,  no organomegaly   Extremities: extremities normal, atraumatic, no cyanosis or edema Skin: no rashes   Neuro: alert, oriented, normal speech, no focal findings or movement disorder noted     EK2021. Normal sinus rhythm. Normal.    Assessment/Plan:       ICD-10-CM ICD-9-CM    1. Preoperative clearance, for upcoming bariatric surgery. Patient has had no symptoms of concerning for ischemic heart disease, decompensated heart failure or unstable cardiac rhythm. She has a good thrill capacity and can achieve 4 METS without limiting symptoms. There are no cardiac contraindications for proposed surgery. Return to the office in 6 months. Z01.818 V72.84 AMB POC EKG ROUTINE W/ 12 LEADS, INTER & REP   2. PCOS (polycystic ovarian syndrome), patient not on oral contraceptive.   PCOS itself  appears to be additional risk for factor for VTE E28.2 256.4    3. Elevated Lp(a)  E78.41 272.8    4. Hyperlipidemia, unspecified hyperlipidemia type , last LDL available was 101 with HDL of 66.   (4/10/2019). Patient is not on statins E78.5 272.4    5. Prediabetes  R73.03 790.29    6. Obesity, morbid ([address]  E66.01 278.01    7. Gastroesophageal reflux disease without esophagitis , Nexium on a daily basis. K21.9 530.81    8       History of echocardiogram; completed 11/21/2020. Normal left ventricular systolic function with ejection fraction of 60%. No significant valvular pathology with trace degree of AI.

## 2021-04-28 ENCOUNTER — TELEPHONE (OUTPATIENT)
Dept: ORTHOPEDIC SURGERY | Age: 36
End: 2021-04-28

## 2021-05-03 ENCOUNTER — DOCUMENTATION ONLY (OUTPATIENT)
Dept: BARIATRICS/WEIGHT MGMT | Age: 36
End: 2021-05-03

## 2021-05-03 ENCOUNTER — HOSPITAL ENCOUNTER (OUTPATIENT)
Dept: BARIATRICS/WEIGHT MGMT | Age: 36
Discharge: HOME | End: 2021-05-03

## 2021-05-03 NOTE — PROGRESS NOTES
[facility]  [address] [facility], [address]    Patient's Name: Moise Uriostegui   Age: 38 y.o. YOB: 1985   Sex: female    Date:   5/3/2021    Session: 3 of  3  Surgeon:  Giuseppe Mcnair    Height: 68\" Weight:    265.7      Lbs  BMI: 40.3     Previous Month's Weight: 272  Pounds Lost since last month: 7                 Pounds Gained since last month: 0    Starting Weight: 278     Overall Pounds Lost: 13   Overall Pounds Gained: 0      Do you smoke? Pt does not smoke    Alcohol intake: Pt does not drink  Number of drinks at a time:  0  Number of times a week: 0    Class Guidelines    Guidelines are reviewed with patient at the start of every class. 1. Patient understands that weight loss trial classes must be consecutive. Patient understands if they miss a class, it is their responsibility to contact me to reschedule class. I will reach out to patient after their first no show. 2.  Patient understands the expectations that weight maintenance/weight loss is expected during the classes. Failure to demonstrate changes may result in one extra month of weight loss trial, followed by going back to see the surgeon. 3. Patient is also instructed to be doing their labs, blood work, psych visit, support group and any other test that the surgeon has used while they are working on their weight loss trial.    Changes Made: Worked hard to control portions; using smaller plates for meals and using to go boxes when at restaurants      Dietary Instruction    During today's class, we continued to focus on the key diet principles. Patient was instructed to follow a low carbohydrate diet, focusing on meat and vegetables. Patient was instructed to stop liquid calories and aim for 64 ounces of water per day.  We focused on focusing in on bigger problem areas to start making changes to, such as reducing fast food intake, reducing carbonated beverages/soda intake, decreasing carbohydrates intake daily, etc. We reviewed protein shakes and high protein yogurts to chose, as well. Patient was educated on good breakfast ideas and high protein snacks. I also reviewed with patient tips on practicing portion control. We covered various portion sizes for protein, starches, veggies, and desserts. Patient was educated on the importance of keeping a food journal/food tracking, not skipping meals, eating slowly, portioning out snacks, and choices to make when dining out or at fast food spots. Tips included taking 20 minutes to eat a meal, portion snacks out into a bowl or bag, portion control guide, using smaller plates and utensils, and splitting meals when dining out. Patient understands that surgery is only a tool in weight loss and incorporating portion control will help patient be most successful with long term weight loss. Patient's diet habits include: Eating 3 meals daily, consisting of salad, chicken and veggies. Biggest portion: veggies. Carbs: rice, veggies, yogurt. Snacking 1-2x/day on carbmaster yogurt or diced cucumber, tomato and avocado. Struggling to completely let go of flavored sparkling water, as she feels it helps her digest her food. Drinking 12 oz coke zero OR 12 oz of sparkling water, 96 oz water (falvored w/ cucumber slices and mint) and 40-56 oz of unsweet black tea. Physical Activity/Exercise    Comments:     Currently for exercise, patient is walking daily and meeting her 6000 step goal daily. We talked about activities for patient to do, including walking, swimming, or chair exercises, as well as some additional steps to take in the day to get extra movement, such as parking further away, walking dog, taking stairs vs elevator, etc. Patient was encouraged to start up an exercise routine and build on it. Behavior Modification  Comments:   Emphasized the importance of eating slowly, not eating and drinking meals at the same time.   Discussed Key Behavior principles to start implementing to be successful following surgery, such as, importance of 3 meals daily, keeping a food journal, avoid distractions during meal times, and chewing food thoroughly, taking 20-30 minutes to eat a meal, as a few examples. Patient understands the importance of following through with these behaviors following surgery to aid in long term weight loss. Tips and advice were given on how to start implementing these into the patient's life. Patient's S.M.A.R.T. Goals are:  1. Increase step goal to 7000 5 days/week  2. Slower eating  3. Cut out carbonated drinks    Patient has attended a support group meeting.       Lesvia Monteiro RD  5/3/2021

## 2021-05-03 NOTE — PROGRESS NOTES
Nutrition Evaluation    Patient's Name: Whit Chong   Age: 38 y.o. YOB: 1985   Sex: female    Height: 68\" Weight: 265.3 lbs HOME SCALE VERIFIED   BMI:  40.3  Starting Weight:  278 lbs      Smoking Status:  Pt does not smoke  Alcohol Intake:  Pt does not drink  Number of Drinks at a Time: 0  Number of Times a Week: 0    Changes made during classes include:  Smaller portions, reduced carbs, no sugary drinks        Two things that patient learned during this weight loss trial:  Essential vitamins  Portion control    Summary:  I feel that Whit Chong has demonstrated appropriate diet changes and is ready to move forward with surgery. Patient has been briefed on the importance of the protein drinks, vitamins, and the transition of the diet stages. Patient understands that the long-term diet will focus on protein and vegetables. Patient understand the effects of carbohydrates after surgery and what reactive hypoglycemia is. Patient is aware that they will be attending pre-op class 2 weeks before surgery and will get more detailed information on the post-op diet guidelines. Patient will see me again at 6 weeks post-op. At this 6 week visit, RD will assess how patient is tolerating soft protein and advance to vegetables, if tolerating soft protein without difficulty. Patient will also see RD again at 9 months post-op. This visit will assess patient's compliance with current protocol, including diet, vitamins, protein shakes, and exercise. Post-op diet guidelines will be reinforced. RD is available for questions and to meet with patient outside of the 6 week and 9 month post-op visit. We spent a lot of time talking about the vitamins. Patient understands the importance of being compliant with the diet protocol and the complications and risks that can occur if they are non-compliant with the nutritional protocol. Patient has attended at least one support group.   Patient has completed the [facility] Graduation Quiz to assess knowledge of post-op diet.        Candidate for surgery: Yes      Procedure:  Gastric Bypass     George Chambers RD  6/11/2021

## 2021-05-13 ENCOUNTER — OFFICE VISIT (OUTPATIENT)
Dept: SURGERY | Age: 36
End: 2021-05-13
Payer: COMMERCIAL

## 2021-05-13 VITALS
RESPIRATION RATE: 20 BRPM | SYSTOLIC BLOOD PRESSURE: 130 MMHG | HEART RATE: 88 BPM | DIASTOLIC BLOOD PRESSURE: 80 MMHG | HEIGHT: 68 IN | WEIGHT: 266 LBS | TEMPERATURE: 98.3 F | BODY MASS INDEX: 40.32 KG/M2

## 2021-05-13 DIAGNOSIS — E66.01 MORBID OBESITY WITH BMI OF 40.0-44.9, ADULT (HCC): Primary | ICD-10-CM

## 2021-05-13 PROCEDURE — 99214 OFFICE O/P EST MOD 30 MIN: CPT | Performed by: SURGERY

## 2021-05-13 NOTE — PROGRESS NOTES
Nima Watson is a 38 y.o. female who presents today with   Chief Complaint   Patient presents with    Morbid Obesity     Pt presents today to discuss surgical options. Body mass index is 40.45 kg/m².

## 2021-05-17 ENCOUNTER — HOSPITAL ENCOUNTER (OUTPATIENT)
Dept: BARIATRICS/WEIGHT MGMT | Age: 36
Discharge: HOME | End: 2021-05-17

## 2021-05-17 ENCOUNTER — DOCUMENTATION ONLY (OUTPATIENT)
Dept: BARIATRICS/WEIGHT MGMT | Age: 36
End: 2021-05-17

## 2021-05-17 ENCOUNTER — TELEPHONE (OUTPATIENT)
Dept: SURGICAL ICU | Age: 36
End: 2021-05-17

## 2021-05-17 NOTE — PROGRESS NOTES
CLINICAL NUTRITION PRE-OPERATIVE EDUCATION    Patient's Name: Raysa Morales   Age: 38 y.o. YOB: 1985   Sex: female    Education & Materials Provided:   - Soft Protein Diet Shopping List  -  Supplemental Resource Guide: MVI, B12, Calcium Citrate, Vitamin D, Vitamin B1,   and iron recommendations  - Protein Supplement Information  - Fluid Requirements/ No Straws  - No Caffeine or Carbonation   - No alcohol              - No Snacks or No Concentrated Sweets     - Exercising   - Support System at [facility] Support Group meetings. Support System after surgery includes: x     - Key Diet Principles            - Addressed Current Habits/Changes to Make   - Patient has been educated on the liquid diet to begin 1 week prior to surgery. Patient understands the transition of the diet. Attendance of support group: Yes    Summary:  Patient has completed the required amount of visits with the Registered Dietitian. During these nutrition visits, we focused on dietary changes, behavior changes, and the importance of establishing an exercise routine. The diet protocol that patient was prescribed emphasized on low carbohydrates (less than 100 grams per day prior to surgery and 60-80 grams of protein per day). At today's session, patient was educated on the post-op diet protocol. Patient understands the importance of keeping total fat and sugar less than 3 grams per serving. Patient is aware of the transition of the diet stages and is aware that they will be on clear liquids for 7days, followed by soft protein for 5 weeks. Patient understands the body needs ~ 60-70 grams of protein per day. During the liquid phase, patient will need 60 grams of protein from shakes. Once eating soft protein, patient will only need 1 shake per day. Patient is aware of the importance of the vitamins and protein drinks. We spent a lot of time talking about the vitamins.   Patient understands the importance of being compliant with the diet protocol and the complications and risks that can occur if they are non-compliant with the nutritional protocol and non-compliant with the vitamins. Patient has also been educated on the pre-op liquid diet for 1 week. Patient understands that failure to comply in pre-op liquid diet could result in surgery being canceled. Patient's 6 week post-op nutrition appointment has been scheduled. At this 6 week visit, RD will assess how patient is tolerating soft protein and advance to vegetables, if tolerating soft protein without difficulty. Patient will also see RD again at 9 months post-op. This visit will assess patient's compliance with current protocol, including diet, vitamins, protein shakes, and exercise. Post-op diet guidelines will be reinforced. RD is available for questions and to meet with patient outside of the 6 week and 9 month post-op visit. Ok to proceed.      Candidate for surgery: Yes  Re-evaluation Date:     [place] [address] RD  5/17/2021

## 2021-05-17 NOTE — PROGRESS NOTES
Preop History and Physical Exam:    Maryam Robert is a 47-year-old black female that she evaluated this office on 24 March 2021 for discussion of surgical options available for definitive management of her clinically severe obesity. Patient at that time weighed 278 pounds with body mass index of 42 with obesity related comorbidities of Gastrosoft reflux disease, reactive airway disease, stricture incontinence, clinical obstructive sleep apnea, related arthropathy and polycystic ovarian syndrome. Patient after discussing surgical options available as elected pursue laparoscopic versus open Barb-en-Y gastric bypass to achieve definitive durable weight loss on first level expected resolution of obesity related comorbidities. Patient presents today in follow-up after completing all multidisciplinary bariatric surgical programmatic requirements necessary for the pursuit of surgery to review today for evaluation and surgical scheduling noting no new medical or surgical history.   Patient currently weighs 266 pounds with a body mass index of 40 with obesity related comorbidities of gastroesophageal reflux disease, reactive airway disease, stress incontinence, clinical obstructive sleep apnea, weight related arthropathy and polycystic ovarian syndrome    Past Medical History:   Diagnosis Date    Anxiety     Asthma     Environmental allergies     GERD (gastroesophageal reflux disease)     HLD (hyperlipidemia)     HSV-2 infection     Nausea & vomiting     MILD NAUSEA ONLY    OCD (obsessive compulsive disorder)     PCOS (polycystic ovarian syndrome)     Prediabetes     Vitamin D deficiency        Past Surgical History:   Procedure Laterality Date    HX BREAST LUMPECTOMY Left 2013    HX CHOLECYSTECTOMY      HX KNEE ARTHROSCOPY Right 2010    HX OTHER SURGICAL  06/25/2019    excsion of pilonida cyst     HX WISDOM TEETH EXTRACTION  2003       Current Outpatient Medications   Medication Sig Dispense Refill    spironolactone (ALDACTONE) 50 mg tablet Take 50 mg by mouth daily.  montelukast (SINGULAIR) 10 mg tablet Take 10 mg by mouth nightly.  metFORMIN (GLUCOPHAGE) 500 mg tablet Take 500 mg by mouth two (2) times daily (with meals).  cholecalciferol (VITAMIN D3) (1000 Units /25 mcg) tablet Take 1,000 Units by mouth daily.  tiotropium bromide (Spiriva Respimat) 2.5 mcg/actuation inhaler Take 2 Puffs by inhalation daily.  fluvoxaMINE (LUVOX) 100 mg tablet       esomeprazole (NexIUM) 40 mg capsule Take  by mouth daily.  therapeutic multivitamin (THEREMS) tablet Take 1 Tab by mouth.  EPIPEN 2-BOB 0.3 mg/0.3 mL injection INJECT 1 PEN INTO LATERAL THIGH FOR SYMPTOMS OF ANAPHYLAXIS.  0    albuterol sulfate (PROAIR RESPICLICK) 90 mcg/actuation aepb Take 2 Puffs by inhalation every four (4) hours as needed. 1 Inhaler 12    cetirizine (ZYRTEC) 10 mg tablet Take  by mouth.  fluticasone propionate (Flovent HFA) 44 mcg/actuation inhaler Take 1 Puff by inhalation two (2) times a day.          Allergies   Allergen Reactions    Latex Atopic Dermatitis    Nubain [Nalbuphine] Hives    Sulfa (Sulfonamide Antibiotics) Hives    Iodine Hives    Shellfish Derived Hives       Social History     Tobacco Use    Smoking status: Never Smoker    Smokeless tobacco: Never Used   Substance Use Topics    Alcohol use: Not Currently     Comment: 1 glass of wine a month    Drug use: No       Family History   Problem Relation Age of Onset    Asthma Mother     GERD Mother     Substance Abuse Mother     Diabetes Father     Sleep Apnea Father     Hypertension Father     Elevated Lipids Father     Alcohol abuse Father     GERD Brother     Cancer Paternal Grandmother         throat 2/2 smoking       Review of Systems:  Positive in BOLD    CONST: Fever, weight loss, fatigue or chills  GI: Nausea, vomiting, abdominal pain, change in bowel habits, hematochezia, melena, and GERD   INTEG: Dermatitis, abnormal moles  HEENT: Recent changes in vision, vertigo, epistaxis, dysphagia and hoarseness  CV: Chest pain, palpitations, HTN, edema and varicosities  RESP: Cough, shortness of breath, wheezing, hemoptysis, snoring and reactive airway disease  : Hematuria, dysuria, frequency, urgency, nocturia and stress urinary incontinence   MS: Weakness, joint pain and arthritis  ENDO: Diabetes, thyroid disease, polyuria, polydipsia, polyphagia, poor wound healing, heat intolerance, cold intolerance  LYMPH/HEME: Anemia, bruising and history of blood transfusions  NEURO: Dizziness, headache, fainting, seizures and stroke  PSYCH: Anxiety and depression    Physical Exam    Visit Vitals  /80 (BP 1 Location: Left upper arm, BP Patient Position: At rest, BP Cuff Size: Adult)   Pulse 88   Temp 98.3 °F (36.8 °C) (Oral)   Resp 20   Ht 5' 8\" (1.727 m)   Wt 120.7 kg (266 lb)   BMI 40.45 kg/m²         General: Clinically severely obese 38 y.o.) female in no acute distress  Head: Normocephalic, atraumatic  Resp: Clear to auscultation bilaterally, now wheeze, rhonchi, or rales, excursions normal and symmetrical  Cardio: Regular rate and rhythm, no murmurs, clicks, gallops, or rubs. No edema or varicosities. Abdomen: Obese, soft, nontender, nondistended, normoactive bowel sounds, no hernias, no hepatosplenomegaly,  Psych: Alert and oriented to person, place, and time. April 14, 2021 CBC, BMP, albumin, lipid profile, TSH, urinalysis, vitamin B1, vitamin B12, vitamin D, iron, folate, H. pylori serology: Vitamin D 21.3, cholesterol 207 with remainder laboratory profile been within normal limits.   Patient was begun on supplemental vitamin D at time of receipt of laboratory profile  April 19, 2019 Pap smear: No evidence of malignancy  Chest x-ray: No evidence of cardiopulmonary disease  April 21, 2021 bilateral mammography: BI-RADS 1nodes malignancy  May 3, 2021 Nutrition/Yusufov: Concurring with pursuit of surgery  May 4, 2021 psychology/Kenneth: Concurrent pursuit of surgery  April 14, 2021 cardiology/Mimslow risk for cardiac risk  April 14, 2021 EKG normal sinus rhythm rate of 78  Impression:    Srinivas Harrington is a 38 y.o. black female with a body mass index of 40 with obesity related comorbidities of hypercholesterolemia, Gastrosoft reflux disease, active airway disease, stricture incontinence, clinical obstructive sleep apnea, polycystic ovarian syndrome, and weight related arthropathy who would benefit from bariatric surgery. We've discussed the restrictive and malabsorptive nature of the gastric bypass and compared and contrasted with the sleeve gastrectomy. The patient understands the likelihood of losing approximately 80% of their excess weight in 12 to 18 months. She also understands the risks including but not limited to bleeding, infection, need for reoperation, anastomotic ulcers, leaks and strictures, bowel obstruction secondary to adhesions and internal hernias, DVT, PE, heart attack, stroke, and death. Patient also understands risks of inadequate weight loss, excess weight loss, vitamin insufficiency, protein malnutrition, excess skin, and loss of hair. We have reviewed the components of a successful postoperative course including requirement for a high protein, low carbohydrate diet, 60 oz a day of zero calorie liquids, daily vitamin supplementation, daily exercise, regular follow-up, and participation in support groups. We have reviewed the preoperative liver shrinking clear liquid diet, as well as reviewed any medication changes required while on the clear liquid diet. In addition, the patient understands that all medications to be taken during the first 8 weeks postoperatively can be taken whole as long as the medication is approximately the size of a Trina 325 mg aspirin tablet in size.   The patient further understands that it is his/her responsibility to review these and verify with their primary care doctor and pharmacist that all medications are of the appropriate size. We will schedule the patient for laparoscopic gastric bypass in the near future.

## 2021-05-17 NOTE — TELEPHONE ENCOUNTER
Nalini Blair Take your temperature and resting pulse in the morning and evening. Record on tracking form 
given to you. Call if your temperature is greater than 101 or your pulse rate is greater than 115.  Please contact your surgeon if you are having excessive abdominal pain (that lasts longer than 
four hours and does not improve with prescribed pain medication), vomiting or shortness of breath.  Get up and move  do not sit in one place for more than an hour.  You need to WALK (EXERCISE) for 30 minutes per day.  Walking around your house does not count.  Bike, treadmill and elliptical are OK.  NO weight lifting or sit ups.  If constipated take an adult dose of Miralax (available over the counter). Contact the doctors 
office if Miralax doesnt help.  You may swallow pills starting the day after surgery as long as they fit inside this [place]:  Continue to use your incentive spirometer (breathing machine) for the next couple of weeks to 
help prevent pneumonia. [address] Temperature/Heart Rate Log Take your temperature and heart rate (pulse) twice a day for 14 days. Take both in the morning and 
evening at about the same time each day (when you wake up and before you go to bed when you 
are relaxed). Please contact your doctors office if your:  Temperature is higher than 101 degrees.  Heart rate (pulse) is higher than 115 beats per minute (normal heart rate is 60 to 100 beats per minute). How to Take Your Heart Rate (Pulse)  Turn your left hand so that your palm is face-up.  With the index and middle fingers of your right 
hand, draw a line from the base of your thumb to 
just below the crease in your wrist. Your fingers 
should nestle just to the left of the large tendon that 
pops up when you bend your wrist toward you.  Dont press too hard, that will make the pulse go 
away. Use gentle pressure.  Wait.  It can take several seconds  and several micro-adjustments in the placement of your two 
fingers on your wrist  to find your pulse. Just keep moving your fingers down or up your wrist 
in small increments (and pausing for a few seconds) until you find it. How to Take Your Pulse Rate [facility] Find a watch with a second hand and place it on your right wrist or on the table next 
to your left hand.  After finding your pulse, count the number of beats for 20 seconds.  Multiply by three to get your heart rate, or beats per minute (or just count for 60 seconds for a math-free option).  Normal, resting heart rate is about 60 to 100 beats per minute.  46  PATIENT GUIDE TO BARIATRIC SURGERY [address] Lovenox Self Injection Guide Prepare Step 1: Wash and dry your hands thoroughly. Step 2: Sit or lie in a comfortable position and choose an area 
on the right or left side of the abdomen at least two inches away 
from the belly button. Step 3: Clean the injection site with an alcohol swab and let dry. Inject Step 4: Remove the needle cap by pulling it straight off the syringe and 
discard it in a sharps . Step 5: With your other hand, pinch an inch of the cleansed area to 
make a fold in the skin. Insert the full length of the needle straight 
down  at a 90? angle  into the fold of skin.  48  PATIENT GUIDE TO BARIATRIC SURGERY [address] Step 6: Press the plunger with your thumb until the syringe is empty. Then pull the needle straight out and release the skin fold. Dispose Step 7: Point the needle down and away from yourself and others, 
and then push down on the plunger to activate the safety shield. Step 8: Place the used syringe in the sharps . Do NOT expel the air bubble from the syringe before the injection. Administration should be alternated between the left and right abdominal wall.  The whole length 
of the needle should be introduced into a skin fold held between the thumb and forefinger; the 
skin fold should be held throughout the injection. To minimize bruising, do not rub the injection 
site after completion of the injection. Questions about LOVENOX? Call [phone]  49  PATIENT GUIDE TO BARIATRIC SURGERY [address] [facility]. DIET AND LIFESTYLE Key Diet Principles Following Bariatric Surgery  Begin each meal with soft moist high protein foods (i.e. chicken, turkey, yogurt, tuna, eggs, 
cottage cheese, other fish and seafood).  Consume a minimum of 64 ounces of fluid each day to prevent dehydration. No straws.  No food and fluid together. Stop drinking 30 minutes before a meal. You may begin fluids again 30 minutes after you finish a meal. 
 Eat very slowly and chew all foods completely.  Keep portions small.  No simple sugars or high fat foods. No carbonated beverages. No caffeine.  Eat three meals per day. No skipping. Avoid snacking between meals.  No alcohol. No smoking.  Two Flintstones Complete Chewable vitamins each day. Take one in the morning and one at night.  1,500 milligrams Calcium Citrate per day in separate dosages.  Vitamin D 3: 5,000 IU taken per day.  Vitamin B-12: Take 1,000 micrograms sublingually daily.  Iron: 60 milligrams per day from [employer].  Protein supplements of your choice. Must be low sugar (0 to 3 grams), low fat (0 to 3 grams) and 
provide at least 35 to 40 grams of protein each day. You need 60 to 70 grams of protein (food 
and supplements) each day.  Minimum of 30 minutes of physical activity daily. Do not iglesia NSAIDS . Do not take Steroids without your surgeons permission.  50  PATIENT GUIDE TO BARIATRIC SURGERY [address] [facility] Your Priorities After Surgery ? Fluid: 64 ounces of fluid per day. ? Protein: 60 to 70 grams of protein per day. ? Walk every day. Clear Liquid Diet One week of clear liquids: minimum of 64 ounces of fluid per day. Fluid:  Zero calorie liquids.  No caffeine.  No carbonation.  No sugary drinks.  No alcohol.  No straws. Food  Protein drinks.  Less than 3 grams of sugar and 
3 grams of fat per serving.  Protein drink should provide you with 
60 to 70 grams of protein. Soft Protein Diet Five weeks of soft protein (1 ounce for soft protein, 3 ounces of yogurt/cottage cheese). Three meals per day and 1 protein shake. Protein shakes should provide you with 30 grams of 
protein on the soft protein diet. Slow transition:  First week on soft protein diet  focus on yogurt, cottage cheese, eggs, vegetarian refried beans,

## 2021-05-19 RX ORDER — ENOXAPARIN SODIUM 100 MG/ML
40 INJECTION SUBCUTANEOUS DAILY
Qty: 7 SYRINGE | Refills: 0 | Status: SHIPPED | OUTPATIENT
Start: 2021-05-19 | End: 2021-05-26

## 2021-05-21 ENCOUNTER — TELEPHONE (OUTPATIENT)
Dept: SURGERY | Age: 36
End: 2021-05-21

## 2021-05-21 NOTE — TELEPHONE ENCOUNTER
Called patient to let her know per insurance she needs to do one more WLT visit with the dietician. Insurance requires total of 90 days for WLT. Gave patient date of 6/9/21 @ 9:30am.    Surgery date was rescheduled for 6/23/21 with arrival time of 5:30am. Patient verbalized understanding.

## 2021-06-08 NOTE — PERIOP NOTES
PRE-SURGICAL INSTRUCTIONS        Patient's Name:  Jeovanny Pat      Today's Date:  6/8/2021              Surgery Date:  6/23/2021                1. Do NOT eat or drink anything, including candy, gum, or ice chips after midnight on 06/22/2021, unless you have specific instructions from your surgeon or anesthesia provider to do so.  2. You may brush your teeth before coming to the hospital.  3. No smoking 24 hours prior to the day of surgery. 4. No alcohol 24 hours prior to the day of surgery. 5. No recreational drugs for one week prior to the day of surgery. 6. Leave all valuables, including money/purse, at home. 7. Remove all jewelry, nail polish, acrylic nails, and makeup (including mascara); no lotions powders, deodorant, or perfume/cologne/after shave on the skin. 8. Glasses/contact lenses and dentures may be worn to the hospital.  They will be removed prior to surgery. 9. Call your doctor if symptoms of a cold or illness develop within 24-48 hours prior to your surgery. 10.  AN ADULT MUST DRIVE YOU HOME AFTER OUTPATIENT SURGERY. 11.  If you are having an outpatient procedure, please make arrangements for a responsible adult to be with you for 24 hours after your surgery. 12.  NO VISITORS in the hospital at this time for outpatient procedures. Exceptions may be made for surgical admissions, per nursing unit guidelines      Special Instructions:      Bring list of CURRENT medications. Bring inhaler. Bring any pertinent legal medical records. Follow physician instructions about insulin. Follow physician instructions about stopping anticoagulants. On the day of surgery, come in the main entrance of [facility]. Let the  at the desk know you are there for surgery. A staff member will come escort you to the surgical area on the second floor.     If you have any questions or concerns, please do not hesitate to call:     (Prior to the day of surgery) PAT department: [phone]   (Day of surgery) Pre-Op department:  [phone]    These surgical instructions were reviewed with patient during the PAT phone call.

## 2021-06-09 ENCOUNTER — HOSPITAL ENCOUNTER (OUTPATIENT)
Dept: BARIATRICS/WEIGHT MGMT | Age: 36
Discharge: HOME | End: 2021-06-09

## 2021-06-09 NOTE — PROGRESS NOTES
[facility]  [id number] [place] [address], [address]    Patient's Name: Abel Palmer   Age: 38 y.o. YOB: 1985   Sex: female    Date:   6/9/2021    Session: 4 of  4  Surgeon:  Dionna Ross    Height: 68\" Weight:    255      Lbs  BMI: 40     Previous Month's Weight: 265  Pounds Lost since last month: 10                Pounds Gained since last month: 0    Starting Weight: 278     Overall Pounds Lost: 13   Overall Pounds Gained: 0      Do you smoke? Pt does not smoke    Alcohol intake: PT does not drink  Number of drinks at a time:  0  Number of times a week: 0    Class Guidelines    Guidelines are reviewed with patient at the start of every class. 1. Patient understands that weight loss trial classes must be consecutive. Patient understands if they miss a class, it is their responsibility to contact me to reschedule class. I will reach out to patient after their first no show. 2.  Patient understands the expectations that weight maintenance/weight loss is expected during the classes. Failure to demonstrate changes may result in one extra month of weight loss trial, followed by going back to see the surgeon. 3. Patient is also instructed to be doing their labs, blood work, psych visit, support group and any other test that the surgeon has used while they are working on their weight loss trial.    Changes Made: Staying consistent w/ portion sizes and finally eliminated sparkling water. :)      Dietary Instruction    During today's class, we continued to focus on the key diet principles. Patient was instructed to follow a low carbohydrate diet, focusing on meat and vegetables. Patient was instructed to stop liquid calories and aim for 64 ounces of water per day.  We focused on focusing in on bigger problem areas to start making changes to, such as reducing fast food intake, reducing carbonated beverages/soda intake, decreasing carbohydrates intake daily, etc. We reviewed protein shakes and high protein yogurts to chose, as well. We also reviewed some busted some myths associated with Bariatric surgery during today's webinar. The myths we covered include:  1. Anyone is eligible for surgery - There is a list of criteria and must show the interdisciplinary team that you are actively making changes with diet, exercise and food behaviors. 2. Bariatric Surgery is taking the \"easy way out\" -- Surgery is just a tool. Habits must be changed and this is a life-long process. Surgery can be viewed as a jumpstart. 3. After getting surgery, you can go back to your old eating habits -- Your body will most likely not allow this, due to symptoms such as dumping syndrome, nausea, and vomiting that may come about when eating the wrong foods or amounts. 4. Bariatric Surgery can lead to a transfer of addictions -- Addictions may come about for multiple reasons. It may be important to speak with a mental health professional if you have a family history of addictions. 5. Patients will have nutritional deficiencies after surgery -- As long as patients are taking all recommended vitamins daily, deficiencies are not likely. 6. I will be able to follow a Keto diet or Intermittent fasting -- We stress the importance of following a diet that is high in protein and low in fat and sugars/carbs after surgery, as well as ensuring consistent protein intake throughout the day to keep blood sugars controlled. 7. I am going to constantly be hungry after surgery -- The portion sizes after sugery may seem small to patients prior to surgery, however, the small portions will be more than enough to keep you full and satisfied at each meal.   8. I should not get pregnant after having bariatric surgery -- Pregnancy may actually be safer for both mom and baby after surgery. It is recommended to wait 18 months post-op to conceive.    Patient was educated on the bariatric process within nutrition, and how to avoid and weed through some inaccurate information and opinions that they may read on the Internet regarding bariatric surgery. Patient's dietary habits include: Eating 3 meals daily, consisting of fish and chicken w/ veggies and a small portion of rice. Biggest portion of meals comes from protein and veggies. Starches: rice, yogurt, veggies. Snacking 1-2x/day on carbmaster yogurt. Had struggled letting go of carbonated beverages, but was finally able to cut them out. Drinking 48-64 oz of water daily, some mixed w/ crystal light + 12-24 oz of unsweet/diet tea,       Physical Activity/Exercise    Comments:     Currently for exercise, patient is walking and meeting her step goals 5x/week. We talked about activities for patient to do, including walking, swimming, or chair exercises, as well as some additional steps to take in the day to get extra movement, such as parking further away, walking dog, taking stairs vs elevator, etc. Patient was encouraged to start up an exercise routine and build on it. Behavior Modification  Comments:   Emphasized the importance of eating slowly, not eating and drinking meals at the same time. Discussed Key Behavior principles to start implementing to be successful following surgery, such as, importance of 3 meals daily, keeping a food journal, avoid distractions during meal times, and chewing food thoroughly, taking 20-30 minutes to eat a meal, as a few examples. Patient understands the importance of following through with these behaviors following surgery to aid in long term weight loss. Tips and advice were given on how to start implementing these into the patient's life. Patient's S.M.A.R.T. Goals are:  1. Drinking more plain water  2. Walking 1-2x/week  3. Try food tracking evelia/journaling    Patient has attended a support group meeting.       Celena Andrade, DUTCH  6/9/2021

## 2021-06-16 ENCOUNTER — TELEPHONE (OUTPATIENT)
Dept: SURGICAL ICU | Age: 36
End: 2021-06-16

## 2021-06-16 RX ORDER — ENOXAPARIN SODIUM 100 MG/ML
40 INJECTION SUBCUTANEOUS DAILY
Qty: 7 SYRINGE | Refills: 0 | Status: SHIPPED | OUTPATIENT
Start: 2021-06-21 | End: 2021-06-28

## 2021-06-17 DIAGNOSIS — Z01.818 PREOP TESTING: Primary | ICD-10-CM

## 2021-06-17 DIAGNOSIS — E66.9 OBESITY (BMI 30-39.9): ICD-10-CM

## 2021-06-18 ENCOUNTER — HOSPITAL ENCOUNTER (OUTPATIENT)
Dept: PREADMISSION TESTING | Age: 36
Discharge: HOME | End: 2021-06-18
Payer: COMMERCIAL

## 2021-06-18 ENCOUNTER — TRANSCRIBE ORDER (OUTPATIENT)
Dept: REGISTRATION | Age: 36
End: 2021-06-18

## 2021-06-18 DIAGNOSIS — Z01.818 PREOP TESTING: ICD-10-CM

## 2021-06-18 DIAGNOSIS — E66.01 MORBID OBESITY (HCC): Primary | ICD-10-CM

## 2021-06-18 DIAGNOSIS — E66.01 MORBID OBESITY (HCC): ICD-10-CM

## 2021-06-18 DIAGNOSIS — E66.9 OBESITY (BMI 30-39.9): ICD-10-CM

## 2021-06-18 LAB
APPEARANCE UR: CLEAR
BACTERIA URNS QL MICRO: NEGATIVE /HPF
BILIRUB UR QL: NEGATIVE
COLOR UR: YELLOW
EPITH CASTS URNS QL MICRO: ABNORMAL /LPF (ref 0–5)
GLUCOSE UR STRIP.AUTO-MCNC: NEGATIVE MG/DL
HGB UR QL STRIP: ABNORMAL
KETONES UR QL STRIP.AUTO: NEGATIVE MG/DL
LEUKOCYTE ESTERASE UR QL STRIP.AUTO: NEGATIVE
NITRITE UR QL STRIP.AUTO: NEGATIVE
PH UR STRIP: 8 [PH] (ref 5–8)
PROT UR STRIP-MCNC: NEGATIVE MG/DL
RBC #/AREA URNS HPF: ABNORMAL /HPF (ref 0–5)
SP GR UR REFRACTOMETRY: 1.02 (ref 1–1.03)
UROBILINOGEN UR QL STRIP.AUTO: 1 EU/DL (ref 0.2–1)
WBC URNS QL MICRO: ABNORMAL /HPF (ref 0–4)

## 2021-06-18 PROCEDURE — 81001 URINALYSIS AUTO W/SCOPE: CPT

## 2021-06-18 PROCEDURE — U0005 INFEC AGEN DETEC AMPLI PROBE: HCPCS

## 2021-06-19 LAB — SARS-COV-2, COV2NT: NOT DETECTED

## 2021-06-21 ENCOUNTER — ANESTHESIA EVENT (OUTPATIENT)
Dept: SURGERY | Age: 36
DRG: 621 | End: 2021-06-21
Payer: COMMERCIAL

## 2021-06-22 RX ORDER — FAMOTIDINE 20 MG/1
20 TABLET, FILM COATED ORAL ONCE
Status: CANCELLED | OUTPATIENT
Start: 2021-06-22 | End: 2021-06-22

## 2021-06-23 ENCOUNTER — ANESTHESIA (OUTPATIENT)
Dept: SURGERY | Age: 36
DRG: 621 | End: 2021-06-23
Payer: COMMERCIAL

## 2021-06-23 ENCOUNTER — HOSPITAL ENCOUNTER (INPATIENT)
Age: 36
LOS: 1 days | Discharge: HOME | DRG: 621 | End: 2021-06-24
Attending: SURGERY | Admitting: SURGERY
Payer: COMMERCIAL

## 2021-06-23 DIAGNOSIS — E66.01 MORBID OBESITY (HCC): ICD-10-CM

## 2021-06-23 DIAGNOSIS — G89.18 POST-OP PAIN: Primary | ICD-10-CM

## 2021-06-23 LAB
GLUCOSE BLD STRIP.AUTO-MCNC: 109 MG/DL (ref 70–110)
GLUCOSE BLD STRIP.AUTO-MCNC: 80 MG/DL (ref 70–110)
HCG UR QL: NEGATIVE

## 2021-06-23 PROCEDURE — 77030009968 HC RELD STPLR ENDOSC J&J -D: Performed by: SURGERY

## 2021-06-23 PROCEDURE — 81025 URINE PREGNANCY TEST: CPT

## 2021-06-23 PROCEDURE — 74011250636 HC RX REV CODE- 250/636: Performed by: NURSE ANESTHETIST, CERTIFIED REGISTERED

## 2021-06-23 PROCEDURE — 77030002996 HC SUT SLK J&J -A: Performed by: SURGERY

## 2021-06-23 PROCEDURE — 77030009932 HC PRB FT CTRL J&J -B: Performed by: SURGERY

## 2021-06-23 PROCEDURE — 74011000250 HC RX REV CODE- 250: Performed by: NURSE ANESTHETIST, CERTIFIED REGISTERED

## 2021-06-23 PROCEDURE — 74011000250 HC RX REV CODE- 250: Performed by: SURGERY

## 2021-06-23 PROCEDURE — 74011000272 HC RX REV CODE- 272: Performed by: SURGERY

## 2021-06-23 PROCEDURE — 77030008603 HC TRCR ENDOSC EPATH J&J -C: Performed by: SURGERY

## 2021-06-23 PROCEDURE — 77030040830 HC CATH URETH FOL MDII -A: Performed by: SURGERY

## 2021-06-23 PROCEDURE — 77030002933 HC SUT MCRYL J&J -A: Performed by: SURGERY

## 2021-06-23 PROCEDURE — 76010000131 HC OR TIME 2 TO 2.5 HR: Performed by: SURGERY

## 2021-06-23 PROCEDURE — 77030008598 HC TRCR ENDOSC BLDLS J&J -B: Performed by: SURGERY

## 2021-06-23 PROCEDURE — 77030010031 HC SCIS ENDOSC MPLR J&J -C: Performed by: SURGERY

## 2021-06-23 PROCEDURE — 77030031139 HC SUT VCRL2 J&J -A: Performed by: SURGERY

## 2021-06-23 PROCEDURE — 77030008756 HC TU IRR SUC STRY -B: Performed by: SURGERY

## 2021-06-23 PROCEDURE — 74011000258 HC RX REV CODE- 258: Performed by: SURGERY

## 2021-06-23 PROCEDURE — 00797 ANES IPER UPR ABD GSTR PX MO: CPT | Performed by: NURSE ANESTHETIST, CERTIFIED REGISTERED

## 2021-06-23 PROCEDURE — 74011250636 HC RX REV CODE- 250/636: Performed by: SURGERY

## 2021-06-23 PROCEDURE — 77030026438 HC STYL ET INTUB CARD -A: Performed by: NURSE ANESTHETIST, CERTIFIED REGISTERED

## 2021-06-23 PROCEDURE — 77030009851 HC PCH RTVR ENDOSC AMR -B: Performed by: SURGERY

## 2021-06-23 PROCEDURE — 76210000016 HC OR PH I REC 1 TO 1.5 HR: Performed by: SURGERY

## 2021-06-23 PROCEDURE — 65270000029 HC RM PRIVATE

## 2021-06-23 PROCEDURE — 43644 LAP GASTRIC BYPASS/ROUX-EN-Y: CPT | Performed by: SURGERY

## 2021-06-23 PROCEDURE — 74011250636 HC RX REV CODE- 250/636

## 2021-06-23 PROCEDURE — 74011250637 HC RX REV CODE- 250/637: Performed by: NURSE ANESTHETIST, CERTIFIED REGISTERED

## 2021-06-23 PROCEDURE — 74011250636 HC RX REV CODE- 250/636: Performed by: ANESTHESIOLOGY

## 2021-06-23 PROCEDURE — 43644 LAP GASTRIC BYPASS/ROUX-EN-Y: CPT | Performed by: NURSE PRACTITIONER

## 2021-06-23 PROCEDURE — 74011250637 HC RX REV CODE- 250/637: Performed by: SURGERY

## 2021-06-23 PROCEDURE — 77030027876 HC STPLR ENDOSC FLX PWR J&J -G1: Performed by: SURGERY

## 2021-06-23 PROCEDURE — 77030033639 HC SHR ENDO COAG HARM 36 J&J -E: Performed by: SURGERY

## 2021-06-23 PROCEDURE — 76060000035 HC ANESTHESIA 2 TO 2.5 HR: Performed by: SURGERY

## 2021-06-23 PROCEDURE — 2709999900 HC NON-CHARGEABLE SUPPLY: Performed by: SURGERY

## 2021-06-23 PROCEDURE — 82962 GLUCOSE BLOOD TEST: CPT

## 2021-06-23 PROCEDURE — 77030013079 HC BLNKT BAIR HGGR 3M -A: Performed by: NURSE ANESTHETIST, CERTIFIED REGISTERED

## 2021-06-23 PROCEDURE — 77030019605: Performed by: SURGERY

## 2021-06-23 PROCEDURE — C9290 INJ, BUPIVACAINE LIPOSOME: HCPCS | Performed by: SURGERY

## 2021-06-23 PROCEDURE — 77030040922 HC BLNKT HYPOTHRM STRY -A: Performed by: SURGERY

## 2021-06-23 PROCEDURE — 0D164ZA BYPASS STOMACH TO JEJUNUM, PERCUTANEOUS ENDOSCOPIC APPROACH: ICD-10-PCS | Performed by: SURGERY

## 2021-06-23 PROCEDURE — 77030008437 HC REINF STRP REINF SEMGD WLGO -C: Performed by: SURGERY

## 2021-06-23 PROCEDURE — 00797 ANES IPER UPR ABD GSTR PX MO: CPT | Performed by: ANESTHESIOLOGY

## 2021-06-23 PROCEDURE — 77030036732 HC RELD STPLR VASC J&J -F: Performed by: SURGERY

## 2021-06-23 PROCEDURE — 77030040361 HC SLV COMPR DVT MDII -B: Performed by: SURGERY

## 2021-06-23 RX ORDER — HYOSCYAMINE SULFATE 0.12 MG/1
0.12 TABLET SUBLINGUAL
Status: DISCONTINUED | OUTPATIENT
Start: 2021-06-23 | End: 2021-06-24 | Stop reason: HOSPADM

## 2021-06-23 RX ORDER — MIDAZOLAM HYDROCHLORIDE 1 MG/ML
INJECTION, SOLUTION INTRAMUSCULAR; INTRAVENOUS AS NEEDED
Status: DISCONTINUED | OUTPATIENT
Start: 2021-06-23 | End: 2021-06-23 | Stop reason: HOSPADM

## 2021-06-23 RX ORDER — SODIUM CHLORIDE 0.9 % (FLUSH) 0.9 %
5-40 SYRINGE (ML) INJECTION EVERY 8 HOURS
Status: DISCONTINUED | OUTPATIENT
Start: 2021-06-23 | End: 2021-06-23 | Stop reason: HOSPADM

## 2021-06-23 RX ORDER — DEXTROSE 50 % IN WATER (D50W) INTRAVENOUS SYRINGE
25-50 AS NEEDED
Status: DISCONTINUED | OUTPATIENT
Start: 2021-06-23 | End: 2021-06-23 | Stop reason: HOSPADM

## 2021-06-23 RX ORDER — CHLORHEXIDINE GLUCONATE 4 G/100ML
SOLUTION TOPICAL AS NEEDED
Status: DISCONTINUED | OUTPATIENT
Start: 2021-06-23 | End: 2021-06-23 | Stop reason: HOSPADM

## 2021-06-23 RX ORDER — SODIUM CHLORIDE, SODIUM LACTATE, POTASSIUM CHLORIDE, CALCIUM CHLORIDE 600; 310; 30; 20 MG/100ML; MG/100ML; MG/100ML; MG/100ML
125 INJECTION, SOLUTION INTRAVENOUS CONTINUOUS
Status: DISCONTINUED | OUTPATIENT
Start: 2021-06-23 | End: 2021-06-23 | Stop reason: HOSPADM

## 2021-06-23 RX ORDER — SODIUM CHLORIDE 0.9 % (FLUSH) 0.9 %
5-40 SYRINGE (ML) INJECTION AS NEEDED
Status: DISCONTINUED | OUTPATIENT
Start: 2021-06-23 | End: 2021-06-23 | Stop reason: HOSPADM

## 2021-06-23 RX ORDER — ACETAMINOPHEN 650 MG/1
650 SUPPOSITORY RECTAL ONCE
Status: DISCONTINUED | OUTPATIENT
Start: 2021-06-23 | End: 2021-06-23 | Stop reason: HOSPADM

## 2021-06-23 RX ORDER — SODIUM CHLORIDE, SODIUM LACTATE, POTASSIUM CHLORIDE, CALCIUM CHLORIDE 600; 310; 30; 20 MG/100ML; MG/100ML; MG/100ML; MG/100ML
25 INJECTION, SOLUTION INTRAVENOUS CONTINUOUS
Status: DISCONTINUED | OUTPATIENT
Start: 2021-06-23 | End: 2021-06-23 | Stop reason: HOSPADM

## 2021-06-23 RX ORDER — SUCCINYLCHOLINE CHLORIDE 20 MG/ML
INJECTION INTRAMUSCULAR; INTRAVENOUS AS NEEDED
Status: DISCONTINUED | OUTPATIENT
Start: 2021-06-23 | End: 2021-06-23 | Stop reason: HOSPADM

## 2021-06-23 RX ORDER — PROMETHAZINE HYDROCHLORIDE 25 MG/ML
INJECTION, SOLUTION INTRAMUSCULAR; INTRAVENOUS
Status: DISPENSED
Start: 2021-06-23 | End: 2021-06-23

## 2021-06-23 RX ORDER — HYDROMORPHONE HYDROCHLORIDE 2 MG/ML
0.5 INJECTION, SOLUTION INTRAMUSCULAR; INTRAVENOUS; SUBCUTANEOUS
Status: DISCONTINUED | OUTPATIENT
Start: 2021-06-23 | End: 2021-06-23 | Stop reason: HOSPADM

## 2021-06-23 RX ORDER — LABETALOL HCL 20 MG/4 ML
10 SYRINGE (ML) INTRAVENOUS
Status: COMPLETED | OUTPATIENT
Start: 2021-06-23 | End: 2021-06-23

## 2021-06-23 RX ORDER — DEXAMETHASONE SODIUM PHOSPHATE 4 MG/ML
INJECTION, SOLUTION INTRA-ARTICULAR; INTRALESIONAL; INTRAMUSCULAR; INTRAVENOUS; SOFT TISSUE AS NEEDED
Status: DISCONTINUED | OUTPATIENT
Start: 2021-06-23 | End: 2021-06-23 | Stop reason: HOSPADM

## 2021-06-23 RX ORDER — WATER 1 ML/ML
IRRIGANT IRRIGATION AS NEEDED
Status: DISCONTINUED | OUTPATIENT
Start: 2021-06-23 | End: 2021-06-23 | Stop reason: HOSPADM

## 2021-06-23 RX ORDER — IPRATROPIUM BROMIDE 0.5 MG/2.5ML
0.5 SOLUTION RESPIRATORY (INHALATION) EVERY 8 HOURS
Status: DISCONTINUED | OUTPATIENT
Start: 2021-06-23 | End: 2021-06-23

## 2021-06-23 RX ORDER — NALOXONE HYDROCHLORIDE 0.4 MG/ML
0.04 INJECTION, SOLUTION INTRAMUSCULAR; INTRAVENOUS; SUBCUTANEOUS AS NEEDED
Status: DISCONTINUED | OUTPATIENT
Start: 2021-06-23 | End: 2021-06-23 | Stop reason: HOSPADM

## 2021-06-23 RX ORDER — HYDROMORPHONE HYDROCHLORIDE 1 MG/ML
1 INJECTION, SOLUTION INTRAMUSCULAR; INTRAVENOUS; SUBCUTANEOUS
Status: DISCONTINUED | OUTPATIENT
Start: 2021-06-23 | End: 2021-06-24 | Stop reason: HOSPADM

## 2021-06-23 RX ORDER — FENTANYL CITRATE 50 UG/ML
INJECTION, SOLUTION INTRAMUSCULAR; INTRAVENOUS AS NEEDED
Status: DISCONTINUED | OUTPATIENT
Start: 2021-06-23 | End: 2021-06-23 | Stop reason: HOSPADM

## 2021-06-23 RX ORDER — ENOXAPARIN SODIUM 100 MG/ML
40 INJECTION SUBCUTANEOUS ONCE
Status: COMPLETED | OUTPATIENT
Start: 2021-06-23 | End: 2021-06-23

## 2021-06-23 RX ORDER — MAGNESIUM SULFATE 100 %
4 CRYSTALS MISCELLANEOUS AS NEEDED
Status: DISCONTINUED | OUTPATIENT
Start: 2021-06-23 | End: 2021-06-23 | Stop reason: HOSPADM

## 2021-06-23 RX ORDER — ACETAMINOPHEN 325 MG/1
650 TABLET ORAL EVERY 6 HOURS
Status: DISCONTINUED | OUTPATIENT
Start: 2021-06-23 | End: 2021-06-24 | Stop reason: HOSPADM

## 2021-06-23 RX ORDER — ALBUTEROL SULFATE 0.83 MG/ML
2.5 SOLUTION RESPIRATORY (INHALATION)
Status: DISCONTINUED | OUTPATIENT
Start: 2021-06-23 | End: 2021-06-24 | Stop reason: HOSPADM

## 2021-06-23 RX ORDER — NALOXONE HYDROCHLORIDE 0.4 MG/ML
0.4 INJECTION, SOLUTION INTRAMUSCULAR; INTRAVENOUS; SUBCUTANEOUS AS NEEDED
Status: DISCONTINUED | OUTPATIENT
Start: 2021-06-23 | End: 2021-06-24 | Stop reason: HOSPADM

## 2021-06-23 RX ORDER — PROMETHAZINE HYDROCHLORIDE 25 MG/ML
12.5 INJECTION, SOLUTION INTRAMUSCULAR; INTRAVENOUS
Status: COMPLETED | OUTPATIENT
Start: 2021-06-23 | End: 2021-06-23

## 2021-06-23 RX ORDER — ONDANSETRON 2 MG/ML
4 INJECTION INTRAMUSCULAR; INTRAVENOUS ONCE
Status: DISCONTINUED | OUTPATIENT
Start: 2021-06-23 | End: 2021-06-23 | Stop reason: HOSPADM

## 2021-06-23 RX ORDER — ONDANSETRON 2 MG/ML
4 INJECTION INTRAMUSCULAR; INTRAVENOUS
Status: DISCONTINUED | OUTPATIENT
Start: 2021-06-23 | End: 2021-06-24 | Stop reason: HOSPADM

## 2021-06-23 RX ORDER — FENTANYL CITRATE 50 UG/ML
50 INJECTION, SOLUTION INTRAMUSCULAR; INTRAVENOUS AS NEEDED
Status: DISCONTINUED | OUTPATIENT
Start: 2021-06-23 | End: 2021-06-23 | Stop reason: HOSPADM

## 2021-06-23 RX ORDER — OXYCODONE AND ACETAMINOPHEN 5; 325 MG/1; MG/1
1 TABLET ORAL ONCE
Status: DISCONTINUED | OUTPATIENT
Start: 2021-06-23 | End: 2021-06-23 | Stop reason: HOSPADM

## 2021-06-23 RX ORDER — ALBUTEROL SULFATE 90 UG/1
AEROSOL, METERED RESPIRATORY (INHALATION) AS NEEDED
Status: DISCONTINUED | OUTPATIENT
Start: 2021-06-23 | End: 2021-06-23 | Stop reason: HOSPADM

## 2021-06-23 RX ORDER — IPRATROPIUM BROMIDE 0.5 MG/2.5ML
0.5 SOLUTION RESPIRATORY (INHALATION)
Status: DISCONTINUED | OUTPATIENT
Start: 2021-06-24 | End: 2021-06-24 | Stop reason: HOSPADM

## 2021-06-23 RX ORDER — LIDOCAINE HYDROCHLORIDE 20 MG/ML
INJECTION, SOLUTION EPIDURAL; INFILTRATION; INTRACAUDAL; PERINEURAL AS NEEDED
Status: DISCONTINUED | OUTPATIENT
Start: 2021-06-23 | End: 2021-06-23 | Stop reason: HOSPADM

## 2021-06-23 RX ORDER — ONDANSETRON 2 MG/ML
INJECTION INTRAMUSCULAR; INTRAVENOUS AS NEEDED
Status: DISCONTINUED | OUTPATIENT
Start: 2021-06-23 | End: 2021-06-23 | Stop reason: HOSPADM

## 2021-06-23 RX ORDER — LABETALOL HCL 20 MG/4 ML
SYRINGE (ML) INTRAVENOUS
Status: COMPLETED
Start: 2021-06-23 | End: 2021-06-23

## 2021-06-23 RX ORDER — OXYCODONE HYDROCHLORIDE 5 MG/1
5 TABLET ORAL
Status: DISCONTINUED | OUTPATIENT
Start: 2021-06-23 | End: 2021-06-24 | Stop reason: HOSPADM

## 2021-06-23 RX ORDER — VECURONIUM BROMIDE FOR INJECTION 1 MG/ML
INJECTION, POWDER, LYOPHILIZED, FOR SOLUTION INTRAVENOUS AS NEEDED
Status: DISCONTINUED | OUTPATIENT
Start: 2021-06-23 | End: 2021-06-23 | Stop reason: HOSPADM

## 2021-06-23 RX ORDER — SODIUM CHLORIDE, SODIUM LACTATE, POTASSIUM CHLORIDE, CALCIUM CHLORIDE 600; 310; 30; 20 MG/100ML; MG/100ML; MG/100ML; MG/100ML
150 INJECTION, SOLUTION INTRAVENOUS CONTINUOUS
Status: DISCONTINUED | OUTPATIENT
Start: 2021-06-23 | End: 2021-06-23 | Stop reason: HOSPADM

## 2021-06-23 RX ORDER — HYDROMORPHONE HYDROCHLORIDE 2 MG/ML
INJECTION, SOLUTION INTRAMUSCULAR; INTRAVENOUS; SUBCUTANEOUS AS NEEDED
Status: DISCONTINUED | OUTPATIENT
Start: 2021-06-23 | End: 2021-06-23 | Stop reason: HOSPADM

## 2021-06-23 RX ORDER — SODIUM CHLORIDE, SODIUM LACTATE, POTASSIUM CHLORIDE, CALCIUM CHLORIDE 600; 310; 30; 20 MG/100ML; MG/100ML; MG/100ML; MG/100ML
150 INJECTION, SOLUTION INTRAVENOUS CONTINUOUS
Status: DISCONTINUED | OUTPATIENT
Start: 2021-06-23 | End: 2021-06-24 | Stop reason: HOSPADM

## 2021-06-23 RX ORDER — FAMOTIDINE 20 MG/1
20 TABLET, FILM COATED ORAL ONCE
Status: DISCONTINUED | OUTPATIENT
Start: 2021-06-23 | End: 2021-06-23 | Stop reason: HOSPADM

## 2021-06-23 RX ORDER — INSULIN LISPRO 100 [IU]/ML
INJECTION, SOLUTION INTRAVENOUS; SUBCUTANEOUS ONCE
Status: DISCONTINUED | OUTPATIENT
Start: 2021-06-23 | End: 2021-06-23 | Stop reason: HOSPADM

## 2021-06-23 RX ORDER — ENOXAPARIN SODIUM 100 MG/ML
40 INJECTION SUBCUTANEOUS DAILY
Status: DISCONTINUED | OUTPATIENT
Start: 2021-06-24 | End: 2021-06-24 | Stop reason: HOSPADM

## 2021-06-23 RX ORDER — LIDOCAINE HYDROCHLORIDE 10 MG/ML
0.1 INJECTION, SOLUTION EPIDURAL; INFILTRATION; INTRACAUDAL; PERINEURAL AS NEEDED
Status: DISCONTINUED | OUTPATIENT
Start: 2021-06-23 | End: 2021-06-23 | Stop reason: HOSPADM

## 2021-06-23 RX ORDER — KETOROLAC TROMETHAMINE 15 MG/ML
15 INJECTION, SOLUTION INTRAMUSCULAR; INTRAVENOUS
Status: DISCONTINUED | OUTPATIENT
Start: 2021-06-23 | End: 2021-06-24 | Stop reason: HOSPADM

## 2021-06-23 RX ORDER — DIPHENHYDRAMINE HYDROCHLORIDE 50 MG/ML
25 INJECTION, SOLUTION INTRAMUSCULAR; INTRAVENOUS
Status: DISCONTINUED | OUTPATIENT
Start: 2021-06-23 | End: 2021-06-24 | Stop reason: HOSPADM

## 2021-06-23 RX ORDER — PROPOFOL 10 MG/ML
INJECTION, EMULSION INTRAVENOUS AS NEEDED
Status: DISCONTINUED | OUTPATIENT
Start: 2021-06-23 | End: 2021-06-23 | Stop reason: HOSPADM

## 2021-06-23 RX ADMIN — VECURONIUM BROMIDE 5 MG: 1 INJECTION, POWDER, LYOPHILIZED, FOR SOLUTION INTRAVENOUS at 07:50

## 2021-06-23 RX ADMIN — KETOROLAC TROMETHAMINE 15 MG: 15 INJECTION, SOLUTION INTRAMUSCULAR; INTRAVENOUS at 14:42

## 2021-06-23 RX ADMIN — LABETALOL HYDROCHLORIDE 10 MG: 5 INJECTION, SOLUTION INTRAVENOUS at 10:18

## 2021-06-23 RX ADMIN — PROPOFOL 200 MG: 10 INJECTION, EMULSION INTRAVENOUS at 07:40

## 2021-06-23 RX ADMIN — OXYCODONE HYDROCHLORIDE 5 MG: 5 TABLET ORAL at 19:04

## 2021-06-23 RX ADMIN — DEXAMETHASONE SODIUM PHOSPHATE 4 MG: 4 INJECTION, SOLUTION INTRAMUSCULAR; INTRAVENOUS at 07:54

## 2021-06-23 RX ADMIN — HYDROMORPHONE HYDROCHLORIDE 0.5 MG: 2 INJECTION, SOLUTION INTRAMUSCULAR; INTRAVENOUS; SUBCUTANEOUS at 08:31

## 2021-06-23 RX ADMIN — FAMOTIDINE 20 MG: 10 INJECTION INTRAVENOUS at 06:36

## 2021-06-23 RX ADMIN — LABETALOL HYDROCHLORIDE 10 MG: 5 INJECTION, SOLUTION INTRAVENOUS at 10:30

## 2021-06-23 RX ADMIN — FENTANYL CITRATE 75 MCG: 50 INJECTION, SOLUTION INTRAMUSCULAR; INTRAVENOUS at 07:40

## 2021-06-23 RX ADMIN — VECURONIUM BROMIDE 2 MG: 1 INJECTION, POWDER, LYOPHILIZED, FOR SOLUTION INTRAVENOUS at 08:28

## 2021-06-23 RX ADMIN — SUCCINYLCHOLINE CHLORIDE 120 MG: 20 INJECTION, SOLUTION INTRAMUSCULAR; INTRAVENOUS at 07:41

## 2021-06-23 RX ADMIN — HYDROMORPHONE HYDROCHLORIDE 0.5 MG: 2 INJECTION, SOLUTION INTRAMUSCULAR; INTRAVENOUS; SUBCUTANEOUS at 10:46

## 2021-06-23 RX ADMIN — PROMETHAZINE HYDROCHLORIDE 12.5 MG: 25 INJECTION INTRAMUSCULAR; INTRAVENOUS at 10:43

## 2021-06-23 RX ADMIN — SUGAMMADEX 231 MG: 100 INJECTION, SOLUTION INTRAVENOUS at 09:35

## 2021-06-23 RX ADMIN — SODIUM CHLORIDE, SODIUM LACTATE, POTASSIUM CHLORIDE, AND CALCIUM CHLORIDE: 600; 310; 30; 20 INJECTION, SOLUTION INTRAVENOUS at 09:33

## 2021-06-23 RX ADMIN — ALBUTEROL SULFATE 2 PUFF: 108 AEROSOL, METERED RESPIRATORY (INHALATION) at 07:30

## 2021-06-23 RX ADMIN — ONDANSETRON 4 MG: 2 INJECTION INTRAMUSCULAR; INTRAVENOUS at 07:54

## 2021-06-23 RX ADMIN — HYDROMORPHONE HYDROCHLORIDE 0.5 MG: 2 INJECTION, SOLUTION INTRAMUSCULAR; INTRAVENOUS; SUBCUTANEOUS at 08:42

## 2021-06-23 RX ADMIN — SODIUM CHLORIDE, SODIUM LACTATE, POTASSIUM CHLORIDE, AND CALCIUM CHLORIDE 150 ML/HR: 600; 310; 30; 20 INJECTION, SOLUTION INTRAVENOUS at 15:25

## 2021-06-23 RX ADMIN — HYDROMORPHONE HYDROCHLORIDE 0.5 MG: 2 INJECTION, SOLUTION INTRAMUSCULAR; INTRAVENOUS; SUBCUTANEOUS at 08:02

## 2021-06-23 RX ADMIN — ACETAMINOPHEN 650 MG: 325 TABLET ORAL at 14:42

## 2021-06-23 RX ADMIN — FENTANYL CITRATE 25 MCG: 50 INJECTION, SOLUTION INTRAMUSCULAR; INTRAVENOUS at 07:57

## 2021-06-23 RX ADMIN — SODIUM CHLORIDE, SODIUM LACTATE, POTASSIUM CHLORIDE, AND CALCIUM CHLORIDE: 600; 310; 30; 20 INJECTION, SOLUTION INTRAVENOUS at 08:44

## 2021-06-23 RX ADMIN — HYOSCYAMINE SULFATE 0.12 MG: 0.12 TABLET ORAL; SUBLINGUAL at 19:06

## 2021-06-23 RX ADMIN — WATER 2 G: 1 INJECTION INTRAMUSCULAR; INTRAVENOUS; SUBCUTANEOUS at 07:42

## 2021-06-23 RX ADMIN — SODIUM CHLORIDE, SODIUM LACTATE, POTASSIUM CHLORIDE, AND CALCIUM CHLORIDE: 600; 310; 30; 20 INJECTION, SOLUTION INTRAVENOUS at 08:10

## 2021-06-23 RX ADMIN — VECURONIUM BROMIDE 1 MG: 1 INJECTION, POWDER, LYOPHILIZED, FOR SOLUTION INTRAVENOUS at 07:40

## 2021-06-23 RX ADMIN — LIDOCAINE HYDROCHLORIDE 100 MG: 20 INJECTION, SOLUTION EPIDURAL; INFILTRATION; INTRACAUDAL; PERINEURAL at 07:40

## 2021-06-23 RX ADMIN — SODIUM CHLORIDE, SODIUM LACTATE, POTASSIUM CHLORIDE, AND CALCIUM CHLORIDE 150 ML/HR: 600; 310; 30; 20 INJECTION, SOLUTION INTRAVENOUS at 06:38

## 2021-06-23 RX ADMIN — OXYCODONE HYDROCHLORIDE 5 MG: 5 TABLET ORAL at 14:42

## 2021-06-23 RX ADMIN — MIDAZOLAM HYDROCHLORIDE 2 MG: 2 INJECTION, SOLUTION INTRAMUSCULAR; INTRAVENOUS at 07:32

## 2021-06-23 RX ADMIN — ENOXAPARIN SODIUM 40 MG: 40 INJECTION SUBCUTANEOUS at 06:37

## 2021-06-23 RX ADMIN — ACETAMINOPHEN 650 MG: 325 TABLET ORAL at 21:29

## 2021-06-23 NOTE — ANESTHESIA POSTPROCEDURE EVALUATION
Procedure(s):  LAPAROSCOPIC BRITTNEE-EN-Y GASTRIC BYPASS. general    Anesthesia Post Evaluation      Multimodal analgesia: multimodal analgesia used between 6 hours prior to anesthesia start to PACU discharge  Patient location during evaluation: PACU  Patient participation: complete - patient participated  Level of consciousness: awake and alert  Pain management: adequate  Airway patency: patent  Anesthetic complications: no  Cardiovascular status: hypertensive and acceptable  Respiratory status: acceptable  Hydration status: acceptable  Post anesthesia nausea and vomiting:  controlled  Final Post Anesthesia Temperature Assessment:  Normothermia (36.0-37.5 degrees C)      INITIAL Post-op Vital signs:   Vitals Value Taken Time   /92 06/23/21 1109   Temp 36.7 °C (98.1 °F) 06/23/21 0953   Pulse 74 06/23/21 1116   Resp 18 06/23/21 1116   SpO2 97 % 06/23/21 1116   Vitals shown include unvalidated device data.

## 2021-06-23 NOTE — H&P
Abraham Olmos DO  General Surgery  Morbid obesity with BMI of 40.0-44.9, adult [facility])  Dx  Morbid Obesity ; Referred by None  Reason for Visit   Progress Notes  Abraham Olmos DO (Physician) [facility] General Surgery     Preop History and Physical Exam:     Alberto Tabares is a 38-year-old black female that she evaluated this office on 24 March 2021 for discussion of surgical options available for definitive management of her clinically severe obesity. Patient at that time weighed 278 pounds with body mass index of 42 with obesity related comorbidities of Gastrosoft reflux disease, reactive airway disease, stricture incontinence, clinical obstructive sleep apnea, related arthropathy and polycystic ovarian syndrome. Patient after discussing surgical options available as elected pursue laparoscopic versus open Barb-en-Y gastric bypass to achieve definitive durable weight loss on first level expected resolution of obesity related comorbidities. Patient presents today in follow-up after completing all multidisciplinary bariatric surgical programmatic requirements necessary for the pursuit of surgery to review today for evaluation and surgical scheduling noting no new medical or surgical history.   Patient currently weighs 266 pounds with a body mass index of 40 with obesity related comorbidities of gastroesophageal reflux disease, reactive airway disease, stress incontinence, clinical obstructive sleep apnea, weight related arthropathy and polycystic ovarian syndrome          Past Medical History:   Diagnosis Date    Anxiety      Asthma      Environmental allergies      GERD (gastroesophageal reflux disease)      HLD (hyperlipidemia)      HSV-2 infection      Nausea & vomiting       MILD NAUSEA ONLY    OCD (obsessive compulsive disorder)      PCOS (polycystic ovarian syndrome)      Prediabetes      Vitamin D deficiency                 Past Surgical History:   Procedure Laterality Date    HX BREAST LUMPECTOMY Left 2013    HX CHOLECYSTECTOMY        HX KNEE ARTHROSCOPY Right 2010    HX OTHER SURGICAL   06/25/2019     excsion of pilonida cyst     HX WISDOM TEETH EXTRACTION   2003                Current Outpatient Medications   Medication Sig Dispense Refill    spironolactone (ALDACTONE) 50 mg tablet Take 50 mg by mouth daily.        montelukast (SINGULAIR) 10 mg tablet Take 10 mg by mouth nightly.        metFORMIN (GLUCOPHAGE) 500 mg tablet Take 500 mg by mouth two (2) times daily (with meals).        cholecalciferol (VITAMIN D3) (1000 Units /25 mcg) tablet Take 1,000 Units by mouth daily.        tiotropium bromide (Spiriva Respimat) 2.5 mcg/actuation inhaler Take 2 Puffs by inhalation daily.        fluvoxaMINE (LUVOX) 100 mg tablet          esomeprazole (NexIUM) 40 mg capsule Take  by mouth daily.        therapeutic multivitamin (THEREMS) tablet Take 1 Tab by mouth.        EPIPEN 2-BOB 0.3 mg/0.3 mL injection INJECT 1 PEN INTO LATERAL THIGH FOR SYMPTOMS OF ANAPHYLAXIS.   0    albuterol sulfate (PROAIR RESPICLICK) 90 mcg/actuation aepb Take 2 Puffs by inhalation every four (4) hours as needed. 1 Inhaler 12    cetirizine (ZYRTEC) 10 mg tablet Take  by mouth.        fluticasone propionate (Flovent HFA) 44 mcg/actuation inhaler Take 1 Puff by inhalation two (2) times a day.                  Allergies   Allergen Reactions    Latex Atopic Dermatitis    Nubain [Nalbuphine] Hives    Sulfa (Sulfonamide Antibiotics) Hives    Iodine Hives    Shellfish Derived Hives         Social History            Tobacco Use    Smoking status: Never Smoker    Smokeless tobacco: Never Used   Substance Use Topics    Alcohol use: Not Currently       Comment: 1 glass of wine a month    Drug use:  No               Family History   Problem Relation Age of Onset    Asthma Mother      GERD Mother      Substance Abuse Mother      Diabetes Father      Sleep Apnea Father      Hypertension Father      Elevated Lipids Father      Alcohol abuse Father      GERD Brother      Cancer Paternal Grandmother           throat 2/2 smoking         Review of Systems:  Positive in BOLD     CONST: Fever, weight loss, fatigue or chills  GI: Nausea, vomiting, abdominal pain, change in bowel habits, hematochezia, melena, and GERD   INTEG: Dermatitis, abnormal moles  HEENT: Recent changes in vision, vertigo, epistaxis, dysphagia and hoarseness  CV: Chest pain, palpitations, HTN, edema and varicosities  RESP: Cough, shortness of breath, wheezing, hemoptysis, snoring and reactive airway disease  : Hematuria, dysuria, frequency, urgency, nocturia and stress urinary incontinence   MS: Weakness, joint pain and arthritis  ENDO: Diabetes, thyroid disease, polyuria, polydipsia, polyphagia, poor wound healing, heat intolerance, cold intolerance  LYMPH/HEME: Anemia, bruising and history of blood transfusions  NEURO: Dizziness, headache, fainting, seizures and stroke  PSYCH: Anxiety and depression     Physical Exam     Visit Vitals  /80 (BP 1 Location: Left upper arm, BP Patient Position: At rest, BP Cuff Size: Adult)   Pulse 88   Temp 98.3 °F (36.8 °C) (Oral)   Resp 20   Ht 5' 8\" (1.727 m)   Wt 120.7 kg (266 lb)   BMI 40.45 kg/m²            General: Clinically severely obese 38 y.o.) female in no acute distress  Head: Normocephalic, atraumatic  Resp: Clear to auscultation bilaterally, now wheeze, rhonchi, or rales, excursions normal and symmetrical  Cardio: Regular rate and rhythm, no murmurs, clicks, gallops, or rubs. No edema or varicosities. Abdomen: Obese, soft, nontender, nondistended, normoactive bowel sounds, no hernias, no hepatosplenomegaly,  Psych: Alert and oriented to person, place, and time.        April 14, 2021 CBC, BMP, albumin, lipid profile, TSH, urinalysis, vitamin B1, vitamin B12, vitamin D, iron, folate, H. pylori serology: Vitamin D 21.3, cholesterol 207 with remainder laboratory profile been within normal limits.   Patient was begun on supplemental vitamin D at time of receipt of laboratory profile  April 19, 2019 Pap smear: No evidence of malignancy  Chest x-ray: No evidence of cardiopulmonary disease  April 21, 2021 bilateral mammography: BI-RADS 1nodes malignancy  May 3, 2021 Nutrition/Yusufov: Concurring with pursuit of surgery  May 4, 2021 psychology/Kenneth: Concurrent pursuit of surgery  April 14, 2021 cardiology/Mimslow risk for cardiac risk  April 14, 2021 EKG normal sinus rhythm rate of 78  Impression:     Angela Serrano is a 38 y.o. black female with a body mass index of 40 with obesity related comorbidities of hypercholesterolemia, Gastrosoft reflux disease, active airway disease, stricture incontinence, clinical obstructive sleep apnea, polycystic ovarian syndrome, and weight related arthropathy who would benefit from bariatric surgery. We've discussed the restrictive and malabsorptive nature of the gastric bypass and compared and contrasted with the sleeve gastrectomy. The patient understands the likelihood of losing approximately 80% of their excess weight in 12 to 18 months. She also understands the risks including but not limited to bleeding, infection, need for reoperation, anastomotic ulcers, leaks and strictures, bowel obstruction secondary to adhesions and internal hernias, DVT, PE, heart attack, stroke, and death. Patient also understands risks of inadequate weight loss, excess weight loss, vitamin insufficiency, protein malnutrition, excess skin, and loss of hair. We have reviewed the components of a successful postoperative course including requirement for a high protein, low carbohydrate diet, 60 oz a day of zero calorie liquids, daily vitamin supplementation, daily exercise, regular follow-up, and participation in support groups. We have reviewed the preoperative liver shrinking clear liquid diet, as well as reviewed any medication changes required while on the clear liquid diet.   In addition, the patient understands that all medications to be taken during the first 8 weeks postoperatively can be taken whole as long as the medication is approximately the size of a Trina 325 mg aspirin tablet in size. The patient further understands that it is his/her responsibility to review these and verify with their primary care doctor and pharmacist that all medications are of the appropriate size. We will schedule the patient for laparoscopic gastric bypass in the near future. The above physical examination reviewed. There is been no interval medical or surgical history. The post laparoscopic surgery open Babr-en-Y gastric bypass to achieve definitive durable weight loss on a personal level was reviewed the patient the risk benefits of operating versus not potential alternatives and potential complications up to including death were discussed with the patient.   The patient understands discussion wished to proceed    Jocelyn Osgood, DO, FACS

## 2021-06-23 NOTE — ANESTHESIA PREPROCEDURE EVALUATION
Relevant Problems   NEUROLOGY   (+) Depression      GASTROINTESTINAL   (+) Gastroesophageal reflux disease without esophagitis      ENDOCRINE   (+) Morbid obesity (HCC)   (+) Obesity, morbid ([address]       Anesthetic History     PONV          Review of Systems / Medical History  Patient summary reviewed, nursing notes reviewed and pertinent labs reviewed    Pulmonary            Asthma : well controlled    Comments: Significant hx of asthma, takes inhaled steroid daily and during \"allergy season\" which is now, daily rescue albuterol. Neuro/Psych         Psychiatric history    Comments: Pt reports anxiety/depression and usually panics prior to mask placement. Cardiovascular  Within defined limits                Exercise tolerance: >4 METS     GI/Hepatic/Renal     GERD: poorly controlled           Endo/Other    Diabetes    Obesity    Comments: Taking Metformin for PCOS, but also states she is \"pre-diabetic\". Other Findings              Physical Exam    Airway  Mallampati: I  TM Distance: 4 - 6 cm  Neck ROM: normal range of motion   Mouth opening: Normal     Cardiovascular    Rhythm: regular  Rate: normal         Dental  No notable dental hx       Pulmonary  Breath sounds clear to auscultation               Abdominal  GI exam deferred       Other Findings            Anesthetic Plan    ASA: 2  Anesthesia type: general          Induction: Intravenous  Anesthetic plan and risks discussed with: Patient      Will give 2 puffs albuterol prior to induction.

## 2021-06-23 NOTE — PERIOP NOTES
TRANSFER - OUT REPORT:    Verbal report given to Veronika(name) on Kia Sandhu  being transferred to 2 Surgical(unit) for routine post - op       Report consisted of patients Situation, Background, Assessment and   Recommendations(SBAR). Information from the following report(s) SBAR, Procedure Summary, Intake/Output and Recent Results was reviewed with the receiving nurse. Lines:   Peripheral IV 06/23/21 Anterior; Left Hand (Active)   Site Assessment Clean, dry, & intact 06/23/21 0953   Phlebitis Assessment 0 06/23/21 0953   Infiltration Assessment 0 06/23/21 0953   Dressing Status Clean, dry, & intact 06/23/21 0953   Dressing Type Tape;Transparent 06/23/21 0953   Hub Color/Line Status Pink; Infusing 06/23/21 0953   Action Taken Dressing reinforced 06/23/21 0643   Alcohol Cap Used No 06/23/21 8309        Opportunity for questions and clarification was provided.       Patient transported with:   O2 @ 2 liters  Tech

## 2021-06-23 NOTE — PROGRESS NOTES
TRANSFER - IN REPORT:    Verbal report received from 2002 Basim Mack RN(name) on Yevgeniy Callahan  being received from [facility]) for routine post - op      Report consisted of patients Situation, Background, Assessment and   Recommendations(SBAR). Information from the following report(s) SBAR, OR Summary, Intake/Output and MAR was reviewed with the receiving nurse. Opportunity for questions and clarification was provided. Assessment completed upon patients arrival to unit and care assumed.

## 2021-06-23 NOTE — PROGRESS NOTES
conducted a pre-surgery visit with Júnior Mason, who is a 38 y.o.,female. The  provided the following Interventions:  Initiated a relationship of care and support. Plan:  Chaplains will continue to follow and will provide pastoral care on an as needed/requested basis.  recommends bedside caregivers page  on duty if patient shows signs of acute spiritual or emotional distress.     [address]  [phone]

## 2021-06-23 NOTE — PROGRESS NOTES
Albuterol nebulizer was therapeutically interchanged for albuterol MDI  AND Ipratropium q8h was therapeutically interchanged for Spiriva Respimat per the P&T Committee approved Therapeutic Interchanges Policy.

## 2021-06-23 NOTE — ROUTINE PROCESS
Bedside and Verbal shift change report given to Daria Wolff RN (oncoming nurse) by Yaima Law RN (offgoing nurse). Report included the following information Kardex, OR Summary and MAR.     0700    End of Shift Note     Bedside and verbal shift change report given to Chris Chávez RN (On coming nurse) by Daria Wolff RN (Off going nurse).   Report included the following information:      --Procedure Summary     --MAR,     --Recent Results     --Med Rec Status    SBAR Recommendations: none    Issues for Provider to address none          Activity This Shift     [] Bed Rest Order   [] Refused   [] Dangled    [] TDWB         Ambulating:     [x] Bathroom     [] BSC     [x] Room/Hallway      Up in Chair for meals    [x]Yes [] No   Voiding       [x] Yes  [] No  Patel          [] Yes  [x] No  Incontinent [] Yes  [x] No    DUE TO VOID done POUR        [] Yes [x] No  Purewick    [] Yes [x] No  New Onset [] Yes [x] No Straight Cath   []Yes  [x] No  Condom Cath  [] Yes [x] No  MD Called      [] Yes  [x] No   Blood Sugars Managed []Yes [x] No    Bowels Moved [] Yes [x] No    Incontinent     [] Yes [x] No Passed Gas []Yes [x] No    New Onset  []Yes [x] No        MD Called []Yes  [x] No     CHG Bath Done     Before Surgery     After Surgery      [x] Yes  [] No  [] Yes  [x] No       Drain Removed [] Yes  [] No [x] N/A    Dressing Changed [] Yes   [x] No [] N/A      Nausea/Vomiting [x] Yes   [] No     Ice Packs Changed [x] Yes   [] No  [] N/A    Incentive Spirometer  [x] Yes  [] No      SCD Pumps On     Ankle Pumping  [x] Yes   [] No      [] Yes   [x] No        Telemetry Monitoring [] Yes   [x] No   Rhythm N/A

## 2021-06-23 NOTE — OP NOTES
[address] [place] [address]   OPERATIVE REPORT    Name:  Pamela Faust  MR#:   [id number]  :  1985  ACCOUNT #:  [de-identified]  DATE OF SERVICE:  2021    PREOPERATIVE DIAGNOSES:  Clinically severe obesity with a body mass index of 39 with obesity-related comorbidities consisting of gastroesophageal reflux disease, reactive airway disease, stress urinary incontinence, chronic obstructive sleep apnea, and polycystic ovarian syndrome. POSTOPERATIVE DIAGNOSES:  Clinically severe obesity with a body mass index of 39 with obesity-related comorbidities consisting of gastroesophageal reflux disease, reactive airway disease, stress urinary incontinence, chronic obstructive sleep apnea, and polycystic ovarian syndrome. PROCEDURE PERFORMED:  Laparoscopic Barb-en-Y gastric bypass utilizing a 15 mL separate tubularized gastric pouch based on the lesser curvature of the stomach, a 920-HP retrocolic/retrogastric Barb limb, and a 40-cm biliopancreatic limb. SURGEON:  Isabel Chacko DO    FIRST ASSISTANT:  Spencer Florian NP    ANESTHESIA:  General endotracheal supplemented at the conclusion of the operative procedure with local infiltration of the operative sites utilizing 266 mg of Exparel diluted in 50 mL of normal saline solution. COMPLICATIONS: None    SPECIMENS REMOVED:  None. IMPLANTS: None    ESTIMATED BLOOD LOSS:  Less than 25 mL. OPERATIVE FINDINGS:  Normal intra-abdominal anatomy. INDICATIONS FOR SURGICAL PROCEDURE:  This is a 78-year-old black female who has failed medical management of her clinically severe obesity, who, after investigating the surgical options, has elected to pursue laparoscopic potentially open Barb-en-Y gastric bypass to achieve definitive durable weight loss on a personal level with expected resolution of obesity-related comorbidities.   The risks and benefits of operative versus nonoperative potential alternatives and potential complications up to and including death were extensively discussed with the patient prior to the surgical procedure, who voiced her understanding and wished to proceed. DESCRIPTION OF PROCEDURE:  The patient received Ancef for antibiotic prophylaxis and Lovenox for DVT prophylaxis in the preoperative staging area. After voiding and signing her operative permit, which was properly witnessed, she was then transported to the operating room where she was placed in supine position on the operating table and SCDs were placed and inflated prior to the induction of general endotracheal anesthesia. A 34-Eritrean orogastric tube was then placed atraumatically to gravity drainage for utilization of incision as gastric decompression as well as a sizing template for construction of the tubularized gastric pouch based on the lesser curvature of the stomach. The abdomen was then prepped and draped in the usual sterile fashion. A time-out procedure was performed according to protocol and agreed upon by all personnel in the operating suite. A transverse 12 mm cutaneous incision was made just superior to the umbilicus in the midline through which a 12-mm Optiview trocar and cannula were placed in the peritoneal cavity under direct vision utilizing a 10-mm 0-degree laparoscope. The laparoscope and trocar were removed. The abdomen was insufflated with carbon dioxide gas, never exceeding a pressure of 15 mmHg, and a 30-degree, 10-mm laparoscope was placed and utilized for the remainder of the procedure. The remaining ports were placed through transverse cutaneous incisions under direct vision utilizing Optiview trocars and cannulas.   The second, a 5 mm incision in the left anterior axillary line two fingerbreadths below the left costal margin; the third, a 12 mm incision midway between the supraumbilical and the left upper quadrant port; and the fourth in a right paramedian location 8 cm from the midline, midway between the costal margin and the umbilicus utilizing a 12 mm incision. After placement of the appropriately sized Optiview trocars and cannulas, visualization of the upper abdomen noted the patient to have normal anatomy. The omentum and transverse colon were reflected superiorly. The ligament of Treitz was identified. 40 cm distal to the ligament of Treitz, the jejunum was transected with a triple-load stapling device 60 mm in length, loaded with 3.5-mm staples. Division of the mesentery down to its root was accomplished with the Harmonic scalpel. The Barb limb was then measured to be 100 cm in length. At this point, on its antimesenteric border, an enterotomy was created with the Harmonic scalpel. A similar antimesenteric enterotomy was created 2 cm proximal to the staple line of the biliopancreatic limb and a stapled side-to-side functional end-to-side jejunojejunostomy was constructed utilizing a triple-load stapling device, 60 mm in length, with 2.5-mm staples introducing one arm of the stapling device into each of the respective limbs prior to firing on the antimesenteric borders. The remaining enteric defect was closed with a running 3-0 Vicryl suture and the mesenteric defect was closed with a running 2-0 silk suture. The ligament of Treitz was re-identified. Just anterior to the ligament of Treitz, a fenestration was created through the mesocolon into the lesser sac utilizing the Harmonic scalpel and the Barb limb was then placed through this mesocolic fenestration into the lesser sac. The omentum and transverse colon were reflected back to their normal anatomic positions. The danielle was identified along the lesser curvature of the stomach. Just inferior to the danielle, along the greater curvature of the stomach, the omentum was  from the gastric wall utilizing the Harmonic scalpel until the Barb limb was visualized within the lesser sac.   A transverse 5 mm cutaneous incision was then made one-third the distance from the xiphisternal junction to the umbilicus in the midline through which a trocar without a cannula was placed under direct vision. This was removed and replaced with a 5-mm atraumatic grasping forceps, which was utilized in conjunction with a  self-retaining retractor to elevate the left lobe of the liver and provide hiatal exposure. The peritoneum overlying the angle of His was then opened with the Harmonic scalpel. 5 cm distal to the GE junction along the lesser curvature of the stomach, the neurovascular elements of the lesser omentum were  from the gastric wall utilizing the Harmonic scalpel. Completion of this lesser curve fenestration into the lesser sac was accomplished utilizing an esophageal retractor introduced posterior to the stomach through the omental fenestration along the greater curvature of the stomach. The 34-Tajik orogastric tube was retracted into the esophagus. A triple-load stapling device, 60 mm in length, loaded with 3.5-mm staples was introduced into the lesser curve fenestration, oriented perpendicular to the lesser curve 5 cm distal to the GE junction prior to firing two-thirds the length of the staple load. The 34-Tajik orogastric tube was then advanced utilizing the sizing template for construction of the tubularized gastric pouch based on the lesser curvature of the stomach. The vertical aspect of the pouch was initiated with a triple-load stapling device, 60 mm in length, loaded with 3.5-mm staples utilizing two-thirds the length of the staple load. The remainder of the pouch was constructed with sequential firings of a triple-load stapling device, 60 mm in length, loaded with 3.5-mm staples, ending the transection at the angle of His. It should be noted that the first full-length 3.5-mm staple load utilized a Seamguard buttressing strip and this created a 15 mL separate tubularized gastric pouch based on the lesser curvature of the stomach.   The Barb limb was then elevated posterior to the stomach in retrogastric position, where a tension-free hand-sewn tubularized gastrojejunostomy was constructed. The geometric orientation of the gastrojejunostomy was at the distal aspect of the tubularized gastric pouch to the antimesenteric border of the Barb limb 2 cm distal to the staple line. A posterior row of running seromuscular 3-0 Vicryl suture was placed. A transverse 12-mm gastrotomy was made at the distal aspect of the tubularized gastric pouch with an adjacent 12-mm Barb limb enterotomy. The running inner layer of full-thickness 3-0 Vicryl suture was initiated in the left lateral posterior aspect of the anastomosis, run across the posterior aspect of the anastomosis, run around the right lateral aspect of the anastomosis to the anterior midline. A second full-thickness 3-0 Vicryl suture was initiated adjacent to the origin of the first and run around the left lateral aspect of the anastomosis to the anterior midline. The 34-Mexican orogastric tube was advanced across the gastrojejunal anastomosis into the Barb limb prior to tying with running inner layer full-thickness 3-0 Vicryl sutures together anteriorly. The gastrojejunal anastomosis was then completed anteriorly utilizing a running seromuscular 3-0 Vicryl suture. The 34-Mexican orogastric tube was removed, and after assuring there was adequate redundancy of the Barb limb above the level of the mesocolon, the omentum and transverse colon were reflected superiorly. The space through which a Serrano's hernia might occur was closed with a running 2-0 silk suture. The mesocolic defect was closed with a series of simple interrupted 2-0 silk sutures. The omentum and transverse colon were reflected back to their normal anatomic positions. The Barb limb was noted to be viable with adequate redundancy above the level of the mesocolon. There was no tension noted at the gastrojejunal anastomosis.   The laparoscope was then shifted to the left midabdominal port to allow visualization of the supraumbilical fascial trocar defect, which was closed with a suture-passing device and #2 Vicryl suture. Operative sites were inspected and noted to be hemostatic. The abdomen was then desufflated off carbon dioxide gas. Trocars were removed under direct vision. The fascial suture was tied. The wounds were irrigated with normal saline solution and closure of the skin was accomplished with a series of simple interrupted buried deep dermal 4-0 Monocryl sutures. The wounds were infiltrated with 266 mg of Exparel diluted in 50 mL of normal saline solution. Steri-Strips were applied as a sterile dressing. Sponge and needle count was correct both during and at the completion of the procedure. The patient tolerated the procedure without operative complications, subsequently was extubated and transported to the recovery room in awake, alert, and stable condition. The patient received 2500 mL of crystalloid during the procedure. The operative start time was 0755 and completion time was 0941.       DO AKHIL Miranda/CHATO_ALEN_I/[email or web address]  D:  06/23/2021 10:15  T:  06/23/2021 17:34  JOB #:  [id number]

## 2021-06-23 NOTE — BRIEF OP NOTE
Brief Postoperative Note    Patient: Jeovanny Pat  YOB: 1985  MRN: [id number]    Date of Procedure: 6/23/2021     Pre-Op Diagnosis: Morbid obesity ([address] [E66.01]    Post-Op Diagnosis: Same as preoperative diagnosis.       Procedure(s):  LAPAROSCOPIC BRITTNEE-EN-Y GASTRIC BYPASS    Surgeon(s):  Jeremy Marino DO    Surgical Assistant: Surg Asst-1: Sandra Pham    Anesthesia: General     Estimated Blood Loss (mL): Minimal    Complications: None    Specimens: * No specimens in log *     Implants: * No implants in log *    Drains:   [REMOVED] Orogastric Tube (Removed)       Findings: ABEL    Electronically Signed by Janis Cox DO on 6/23/2021 at 10:03 AM

## 2021-06-24 VITALS
OXYGEN SATURATION: 99 % | BODY MASS INDEX: 38.65 KG/M2 | HEIGHT: 68 IN | WEIGHT: 255 LBS | SYSTOLIC BLOOD PRESSURE: 134 MMHG | DIASTOLIC BLOOD PRESSURE: 85 MMHG | RESPIRATION RATE: 16 BRPM | TEMPERATURE: 98.2 F | HEART RATE: 84 BPM

## 2021-06-24 LAB
ANION GAP SERPL CALC-SCNC: 7 MMOL/L (ref 3–18)
BUN SERPL-MCNC: 8 MG/DL (ref 7–18)
BUN/CREAT SERPL: 15 (ref 12–20)
CALCIUM SERPL-MCNC: 8.2 MG/DL (ref 8.5–10.1)
CHLORIDE SERPL-SCNC: 104 MMOL/L (ref 100–111)
CO2 SERPL-SCNC: 25 MMOL/L (ref 21–32)
CREAT SERPL-MCNC: 0.52 MG/DL (ref 0.6–1.3)
ERYTHROCYTE [DISTWIDTH] IN BLOOD BY AUTOMATED COUNT: 13.5 % (ref 11.6–14.5)
GLUCOSE SERPL-MCNC: 110 MG/DL (ref 74–99)
HCT VFR BLD AUTO: 35.6 % (ref 35–45)
HGB BLD-MCNC: 11.8 G/DL (ref 12–16)
MAGNESIUM SERPL-MCNC: 1.8 MG/DL (ref 1.6–2.6)
MCH RBC QN AUTO: 29.1 PG (ref 24–34)
MCHC RBC AUTO-ENTMCNC: 33.1 G/DL (ref 31–37)
MCV RBC AUTO: 87.9 FL (ref 74–97)
PLATELET # BLD AUTO: 276 K/UL (ref 135–420)
PMV BLD AUTO: 10.1 FL (ref 9.2–11.8)
POTASSIUM SERPL-SCNC: 3.9 MMOL/L (ref 3.5–5.5)
RBC # BLD AUTO: 4.05 M/UL (ref 4.2–5.3)
SODIUM SERPL-SCNC: 136 MMOL/L (ref 136–145)
WBC # BLD AUTO: 12.9 K/UL (ref 4.6–13.2)

## 2021-06-24 PROCEDURE — 85027 COMPLETE CBC AUTOMATED: CPT

## 2021-06-24 PROCEDURE — C9113 INJ PANTOPRAZOLE SODIUM, VIA: HCPCS | Performed by: SURGERY

## 2021-06-24 PROCEDURE — 80048 BASIC METABOLIC PNL TOTAL CA: CPT

## 2021-06-24 PROCEDURE — 99024 POSTOP FOLLOW-UP VISIT: CPT | Performed by: NURSE PRACTITIONER

## 2021-06-24 PROCEDURE — 36415 COLL VENOUS BLD VENIPUNCTURE: CPT

## 2021-06-24 PROCEDURE — 2709999900 HC NON-CHARGEABLE SUPPLY

## 2021-06-24 PROCEDURE — 74011250636 HC RX REV CODE- 250/636: Performed by: SURGERY

## 2021-06-24 PROCEDURE — 83735 ASSAY OF MAGNESIUM: CPT

## 2021-06-24 PROCEDURE — 74011250637 HC RX REV CODE- 250/637: Performed by: SURGERY

## 2021-06-24 PROCEDURE — 74011000250 HC RX REV CODE- 250: Performed by: SURGERY

## 2021-06-24 RX ORDER — OXYCODONE HYDROCHLORIDE 5 MG/1
5 TABLET ORAL
Qty: 18 TABLET | Refills: 0 | Status: SHIPPED | OUTPATIENT
Start: 2021-06-24 | End: 2021-06-27

## 2021-06-24 RX ORDER — MONTELUKAST SODIUM 10 MG/1
10 TABLET ORAL
Status: DISCONTINUED | OUTPATIENT
Start: 2021-06-24 | End: 2021-06-24 | Stop reason: HOSPADM

## 2021-06-24 RX ORDER — ONDANSETRON 4 MG/1
4 TABLET, ORALLY DISINTEGRATING ORAL
Qty: 15 TABLET | Refills: 0 | Status: SHIPPED | OUTPATIENT
Start: 2021-06-24 | End: 2021-09-13

## 2021-06-24 RX ORDER — LORATADINE 10 MG/1
10 TABLET ORAL DAILY
Status: DISCONTINUED | OUTPATIENT
Start: 2021-06-24 | End: 2021-06-24 | Stop reason: HOSPADM

## 2021-06-24 RX ORDER — PANTOPRAZOLE SODIUM 40 MG/1
40 TABLET, DELAYED RELEASE ORAL
Status: DISCONTINUED | OUTPATIENT
Start: 2021-06-25 | End: 2021-06-24 | Stop reason: HOSPADM

## 2021-06-24 RX ORDER — FLUVOXAMINE MALEATE 50 MG/1
100 TABLET ORAL EVERY EVENING
Status: DISCONTINUED | OUTPATIENT
Start: 2021-06-24 | End: 2021-06-24 | Stop reason: HOSPADM

## 2021-06-24 RX ADMIN — OXYCODONE HYDROCHLORIDE 5 MG: 5 TABLET ORAL at 05:51

## 2021-06-24 RX ADMIN — ACETAMINOPHEN 650 MG: 325 TABLET ORAL at 02:20

## 2021-06-24 RX ADMIN — HYOSCYAMINE SULFATE 0.12 MG: 0.12 TABLET ORAL; SUBLINGUAL at 01:20

## 2021-06-24 RX ADMIN — SODIUM CHLORIDE, SODIUM LACTATE, POTASSIUM CHLORIDE, AND CALCIUM CHLORIDE 150 ML/HR: 600; 310; 30; 20 INJECTION, SOLUTION INTRAVENOUS at 01:24

## 2021-06-24 RX ADMIN — ENOXAPARIN SODIUM 40 MG: 40 INJECTION SUBCUTANEOUS at 09:49

## 2021-06-24 RX ADMIN — OXYCODONE HYDROCHLORIDE 5 MG: 5 TABLET ORAL at 01:20

## 2021-06-24 RX ADMIN — SODIUM CHLORIDE 40 MG: 9 INJECTION, SOLUTION INTRAMUSCULAR; INTRAVENOUS; SUBCUTANEOUS at 09:49

## 2021-06-24 RX ADMIN — ACETAMINOPHEN 650 MG: 325 TABLET ORAL at 09:49

## 2021-06-24 NOTE — ROUTINE PROCESS
I have reviewed discharge  instruction with the patient. The patient verbalized understanding. Discharged medications reviewed with patient and appropriate educational materials and adverse effects of medications teaching were provided. Patient's VS was stable  Patient armband was removed and shredded. Patient is cleared for discharge. Patient escorted to the heart center entrance ambulating, all belonging sent with patient.

## 2021-06-24 NOTE — DISCHARGE SUMMARY
Bariatric Surgery Discharge Progress Note    Admission Date: 6/23/2021    Discharge Date: 6/24/2021    PREOPERATIVE DIAGNOSES:  Clinically severe obesity with a body mass index of 39 with obesity-related comorbidities consisting of gastroesophageal reflux disease, reactive airway disease, stress urinary incontinence, chronic obstructive sleep apnea, and polycystic ovarian syndrome.     POSTOPERATIVE DIAGNOSES:  Clinically severe obesity with a body mass index of 39 with obesity-related comorbidities consisting of gastroesophageal reflux disease, reactive airway disease, stress urinary incontinence, chronic obstructive sleep apnea, and polycystic ovarian syndrome.     PROCEDURE PERFORMED:  Laparoscopic Babr-en-Y gastric bypass utilizing a 15 mL separate tubularized gastric pouch based on the lesser curvature of the stomach, a 288-VF retrocolic/retrogastric Barb limb, and a 40-cm biliopancreatic limb. Postop Complications: none    Hospital Course:  Patient was admitted on 6/23/2021 for scheduled bariatric surgery. Operation was without significant complication. Patient admitted to the floor postoperatively, monitored as per protocol. Diet sequentially advanced beginning POD 1, pain medications transitioned to oral during the hospital course. Currently the patient is afebrile, vital signs stable, tolerating a clear liquid diet with protein supplementation, voiding spontaneously, ambulatory with adequate pain control with oral medications and clear surgical sites without evidence of infection. Discharge Diet:  Clear Liquid Bariatric Diet for 7 days, then soft moist protein diet for 5 weeks    Discharge Medications:  Current Discharge Medication List      START taking these medications    Details   oxyCODONE IR (ROXICODONE) 5 mg immediate release tablet Take 1 Tablet by mouth every four (4) hours as needed for Pain for up to 3 days.  Max Daily Amount: 30 mg.  Qty: 18 Tablet, Refills: 0  Start date: 6/24/2021, End date: 6/27/2021    Associated Diagnoses: Post-op pain      ondansetron (ZOFRAN ODT) 4 mg disintegrating tablet Take 1 Tablet by mouth every eight (8) hours as needed for Nausea or Vomiting. Qty: 15 Tablet, Refills: 0  Start date: 6/24/2021         CONTINUE these medications which have NOT CHANGED    Details   spironolactone (ALDACTONE) 50 mg tablet Take 50 mg by mouth daily. montelukast (SINGULAIR) 10 mg tablet Take 10 mg by mouth nightly. cholecalciferol (VITAMIN D3) (1000 Units /25 mcg) tablet Take 1,000 Units by mouth daily. tiotropium bromide (Spiriva Respimat) 2.5 mcg/actuation inhaler Take 2 Puffs by inhalation daily. fluvoxaMINE (LUVOX) 100 mg tablet       therapeutic multivitamin (THEREMS) tablet Take 1 Tab by mouth. EPIPEN 2-BOB 0.3 mg/0.3 mL injection INJECT 1 PEN INTO LATERAL THIGH FOR SYMPTOMS OF ANAPHYLAXIS. Refills: 0      albuterol sulfate (PROAIR RESPICLICK) 90 mcg/actuation aepb Take 2 Puffs by inhalation every four (4) hours as needed. Qty: 1 Inhaler, Refills: 12    Associated Diagnoses: Uncomplicated asthma, unspecified asthma severity, unspecified whether persistent      cetirizine (ZYRTEC) 10 mg tablet Take  by mouth.      enoxaparin (Lovenox) 40 mg/0.4 mL 0.4 mL by SubCUTAneous route daily for 7 days.   Qty: 7 Syringe, Refills: 0    Comments: 6/23         STOP taking these medications       metFORMIN (GLUCOPHAGE) 500 mg tablet Comments:   Reason for Stopping:         esomeprazole (NexIUM) 40 mg capsule Comments:   Reason for Stopping:                 Bariatric Chewable vitamins, 2 orally daily for life  Calcium Citrate 1500 mg orally daily for life  Vitamin B12 1000 micrograms sublingual daily for life  Vitamin D3 5,000 units orally daily for life   Vitamin B1 100 mg orally daily for life    Discharge disposition: home    Discharge condition: stable      Local wound care with daily showers, keep wounds clean and dry     Activity: as desired, no lifting, pushing, or pulling greater than 15lbs or situps for 30 days     Special Instructions:            - No driving until activity is not influenced by incisional pain and off narcotics            - No bath or hot tub until wounds are healed            - Check pulse and temperature twice daily for 10 days            - Notify [facility] for a Temp >100.5 or Pulse>115     Follow-up with your surgeon in 2 weeks, call office for appointment [phone] ([address]) [address] [facility])

## 2021-06-24 NOTE — PROGRESS NOTES
Patient up to the bathroom , then sitting up in the chair . Tolerated well, call bell within reach .

## 2021-06-24 NOTE — ROUTINE PROCESS
Patient was educated on all discharge instructions below. He/she understood and was provided a copy. He/she knows who to call for any issues post discharge. Hydration  Hydration is your NUMBER ONE priority. Dehydration is the most common reason for readmission to the hospital. Dehydration occurs when  your body does not get enough fluid to keep it functioning at its best. Your body also requires fluid  to burn its stored fat calories for energy. Carry a bottle of water with you all day, even when you are away from home; remind yourself to  drink even if you dont feel thirsty. Drinking 64 ounces of fluid is your daily goal. You can tell if  youre getting enough fluid if youre making clear, light-colored urine five to 10 times per day. Signs of dehydration can be thirst, headache, hard stools or dizziness upon sitting or standing up. You should contact your surgeons office if you are unable to drink enough fluid to stay hydrated.   General Care after Surgery   No lifting over 15 pounds for four weeks.  No driving while taking the pain medication (about seven to 10 days).  No tub baths, swimming or hot tubs until incisions are healed (about two weeks).  You may shower. Clean incisions daily /gently with soap and check incisions for signs of infection:   Redness around incision.  Swelling at site.  Drainage with an foul odor (pus).  Increase tenderness around incision.  Take your temperature and resting pulse in the morning and evening. Record on tracking form  given to you. Call if your temperature is greater than 101 or your pulse rate is greater than 115.  Please contact your surgeon if you are having excessive abdominal pain (that lasts longer than  four hours and does not improve with prescribed pain medication), vomiting or shortness of breath.  Get up and move  do not sit in one place for more than an hour.  You need to WALK (EXERCISE) for 30 minutes per day.    Walking around your house does not count.  Bike, treadmill and elliptical are OK.  NO weight lifting or sit ups.  If constipated take an adult dose of Miralax (available over the counter). Contact the doctors  office if Miralax doesnt help.  You may swallow pills starting the day after surgery as long as they fit inside this [place]:   Continue to use your incentive spirometer (breathing machine) for the next couple of weeks to  help prevent pneumonia. [address]  Temperature/Heart Rate Log  Take your temperature and heart rate (pulse) twice a day for 14 days. Take both in the morning and  evening at about the same time each day (when you wake up and before you go to bed when you  are relaxed). Please contact your doctors office if your:   Temperature is higher than 101 degrees.  Heart rate (pulse) is higher than 115 beats per minute  (normal heart rate is 60 to 100 beats per minute). How to Take Your Heart Rate (Pulse)   Turn your left hand so that your palm is face-up.  With the index and middle fingers of your right  hand, draw a line from the base of your thumb to  just below the crease in your wrist. Your fingers  should nestle just to the left of the large tendon that  pops up when you bend your wrist toward you.  Dont press too hard, that will make the pulse go  away. Use gentle pressure.  Wait. It can take several seconds  and several micro-adjustments in the placement of your two  fingers on your wrist  to find your pulse. Just keep moving your fingers down or up your wrist  in small increments (and pausing for a few seconds) until you find it. How to Take Your Pulse Rate   Find a watch with a second hand and place it on your right wrist or on the table next  to your left hand.  After finding your pulse, count the number of beats for 20 seconds.  Multiply by three to get your heart rate, or beats per minute  (or just count for 60 seconds for a math-free option).    Normal, resting heart rate is about 60 to 100 beats per minute.  40 Heather Dawkins  Lovenox Self Injection Guide  Prepare  Step 1: Wash and dry your hands thoroughly. Step 2: Sit or lie in a comfortable position and choose an area  on the right or left side of the abdomen at least two inches away  from the belly button. Step 3: Clean the injection site with an alcohol swab and let dry. Inject  Step 4: Remove the needle cap by pulling it straight off the syringe and  discard it in a sharps . Step 5: With your other hand, pinch an inch of the cleansed area to  make a fold in the skin. Insert the full length of the needle straight  down  at a 90? angle  into the fold of skin.  48   PATIENT GUIDE TO BARIATRIC SURGERY    Step 6: Press the plunger with your thumb until the syringe is empty. Then pull the needle straight out and release the skin fold. Dispose  Step 7: Point the needle down and away from yourself and others,  and then push down on the plunger to activate the safety shield. Step 8: Place the used syringe in the sharps . Do NOT expel the air bubble from the syringe before the injection. Administration should be alternated between the left and right abdominal wall. The whole length  of the needle should be introduced into a skin fold held between the thumb and forefinger; the  skin fold should be held throughout the injection. To minimize bruising, do not rub the injection  site after completion of the injection. Questions about LOVENOX? Call [phone]   52   PATIENT GUIDE TO BARIATRIC SURGERY    9. DIET AND LIFESTYLE  Key Diet Principles Following Bariatric Surgery   Begin each meal with soft moist high protein foods (i.e. chicken, turkey, yogurt, tuna, eggs,  cottage cheese, other fish and seafood).  Consume a minimum of 64 ounces of fluid each day to prevent dehydration. No straws.    No food and fluid together. Stop drinking 30 minutes before a meal. You may begin fluids again  30 minutes after you finish a meal.   Eat very slowly and chew all foods completely.  Keep portions small.  No simple sugars or high fat foods. No carbonated beverages. No caffeine.  Eat three meals per day. No skipping. Avoid snacking between meals.  No alcohol. No smoking.  Two Flintstones Complete Chewable vitamins each day. Take one in the morning and one at night.  1,500 milligrams Calcium Citrate per day in separate dosages.  Vitamin D 3: 5,000 IU taken per day.  Vitamin B-12: Take 1,000 micrograms sublingually daily.  Iron: 60 milligrams per day from [facility] [employer]. Vitamin B1 100 mg daily   Protein supplements of your choice. Must be low sugar (0 to 3 grams), low fat (0 to 3 grams) and  provide at least 35 to 40 grams of protein each day. You need 60 to 70 grams of protein (food  and supplements) each day.  Minimum of 30 minutes of physical activity daily. Do not iglesia NSAIDS . Do not take Steroids without your surgeons permission.  [address] [place] [address]  Your Priorities After Surgery  ? Fluid: 64 ounces of fluid per day. ? Protein: 60 to 70 grams of protein per day. ? Walk every day. Clear Liquid Diet  One week of clear liquids: minimum of 64 ounces of fluid per day. Fluid:   Zero calorie liquids.  No caffeine.  No carbonation.  No sugary drinks.  No alcohol.  No straws. Food   Protein drinks.  Less than 3 grams of sugar and  3 grams of fat per serving.  Protein drink should provide you with  60 to 70 grams of protein. Soft Protein Diet  Five weeks of soft protein (1 ounce for soft protein, 3 ounces of yogurt/cottage cheese). Three meals per day and 1 protein shake. Protein shakes should provide you with 30 grams of  protein on the soft protein diet.   Slow transition:   First week on soft protein diet  focus on yogurt, cottage cheese, eggs, vegetarian refried beans,  black beans, kidney beans and white beans. (NO BAKED BEANS.)   Second through fourth week on soft protein diet  focus on yogurt, cottage cheese, eggs,  canned tuna, canned chicken, tilapia and fish (needs to be soft enough to be cut up with a fork).  Fifth week on soft protein diet  focus on yogurt, cottage cheese, eggs, canned tuna, canned  chicken, tilapia, fish, salmon, chicken breast or turkey. Fluid is your #1 Priority! Continue clear liquids between meals. You will need 64 ounces of fluid per day. Fluids that you can have include:   Water.  Zero calorie liquids. You will need to sip throughout the day and should therefore have a water bottle with you at all  times! No liquids with your meals. Stop 30 minutes before a meal and wait 30 minutes after a meal.  No straws. Zero calorie liquids. No caffeine. No carbonation. No sugary drinks. No alcohol.  [facility]  Protein  You will need 60 to 70 grams of protein per day.  60 to 70 grams of protein shakes when on the clear liquid diet (two to three shakes per day).  30 to 50 grams of protein shakes when on the soft protein diet (one shake per day). Eat Three Times Per Day  You will need to eat three times per day. My planned times are:  _________________________________________________________  _________________________________________________________  _________________________________________________________  Nausea, Vomiting, Stomach Pain  If you have problems with nausea, vomiting or stomach pain, try:   Eating slowly: 20 to 30 minutes per meal.   Chewing food thoroughly: 20 to 30 chews before food is swallowed.  Small portions: measure portions in medicine cup.  Stopping before feeling full.  AVOIDING SUGAR and FRIED FOOD: sugar will cause dumping syndrome and lead to weight gain.   Exercise  I will need to get a minimum of 30 minutes of exercise per day or 150 minutes of exercise per week.  Walking, swimming, biking or elliptical.   Find something you enjoy! Vitamins  After surgery, you will need to take the following vitamins for the rest of your life  FOREVER.  Vitamin D 3: 5,000 IU per day.  Calcium Citrate: 1,500 milligrams, taken separately.  Flintstones Complete: two per day, taken separately.  Sublingual Vitamin B-12: 1,000 micrograms daily.  Iron for menstruating women or patients with a history of low iron: 65 milligrams daily. We recommend going to www.bariatricadipnexusage. [employer] and purchasing iron from there. The lemon-lime has 60 milligrams. This iron is better absorbed than over-the-counter iron.  52   PATIENT GUIDE TO BARIATRIC SURGERY    Clear Liquid Log  Getting your fluid in is top priority during this week. Fluids (MINIUM of 64 ounces per day):  ? 8 oz. ? 8 oz. ? 8 oz. ? 8 oz. ? 8 oz. ? 8 oz. ? 8 oz. ? 8 oz. ? 8 oz. ? 8 oz. ? 8 oz. ? 8 oz. Flintstones Complete Chewable: ? a.m. ? p.m. Calcium Citrate (1,500 milligrams/day):  Pill form  ? Two crushed pills (morning) ? Two crushed pills (afternoon) ? Two crushed pills (evening)  OR Upcal D (powder)  ? One pack/scoop ? One pack/scoop ? One pack/scoop  OR Celebrate Chewable Vitamins (500 mg each) or Bariatric Advantage Chewables (500 mg)  ? One chewable (morning) ? One chewable (afternoon) ? One chewable (evening)  OR Liquid Calcium Citrate  ? 1 tbsp. Calcium Citrate ? 1 tbsp. Calcium Citrate ? 1 tbsp. Calcium Citrate  Vitamin D3: ? 5,000 IU daily. Vitamin B-12: ? 1,000 micrograms per day. Iron (menstruating women or patients with a history of low iron):  ? 60 milligrams per day from [facility] [employer]. Protein drinks (protein drinks should be under 3 grams of sugar and 3 grams of fat). Protein shake (60 grams per day): ? a.m. ? p.m. Exercise: ? 30 to 40 minutes per day.    48   PATIENT GUIDE TO BARIATRIC SURGERY    Bariatric Soft and Moist Diet Shopping List   alcium Citrate (1,500 milligrams/day):  Pill form  ? Two crushed pills (morning) ? Two crushed pills (afternoon) ? Two crushed pills (evening)  OR Upcal D (powder)  ? One pack/scoop ? One pack/scoop ? One pack/scoop  OR Celebrate Chewable Vitamins (500 mg each) or Bariatric Advantage Chewables (500 mg)  ? One chewable (morning) ? One chewable (afternoon) ? One chewable (evening)  OR Liquid Calcium Citrate  ? 1 tbsp. Calcium Citrate ? 1 tbsp. Calcium Citrate ? 1 tbsp. Calcium Citrate  Vitamin D3: ? 5,000 IU daily  Vitamin B-12: ? 1,000 micrograms per day  Iron (menstruating women or  patients with a history of low iron):  ? 60 milligrams per day  from [facility] [employer]  Protein drinks (protein drinks should be under  3 grams of sugar and 3 grams of fat). Protein shake (30 to 40 grams per day):  ? a.m. ? p.m. Exercise: ? 30 to 40 minutes per  Educated on Diet Progression    Bon Jimmy Gastric Bypass and Sleeve Dietary Progression    Patient Name:   Date of Surgery: Ice Chips start once admitted on floor. Begin Bariatric Clear Liquid Diet on:     Clear Liquid Diet: 64 oz. of fluid per day  o Low calorie, low sugar, non-carbonated beverages  - Water, Crystal Light, Propel Water, Sugar Free Jell-O, Sugar Free Popsicles, Bouillon  - Start protein supplement during this stage. (60-70 grams per day)  - Getting your fluid in and staying hydrated is your #1 priority! - The clear liquid diet will last for 7 days. Begin Bariatric Soft and Moist on:   - This stage of the diet will last for 5 weeks, unless otherwise instructed by your surgeon. - Begin:  1 week post-op   - End:  6 weeks post-op (or when you follow up with the Registered Dietitian)    - Soft, moist, high protein foods: 3 meals per day plus protein supplements. o   Portions should emphasize on soft protein.   o Portions will be a MAXIMUM of:  o  1 ounce of solid food  o  2-3 ounces of cottage cheese and yogurt. o Protein supplements should be between meals and provide 30-40 grams per day during soft protein diet. o Continue to get 64 ounces of fluid in per day. - Protein foods that are ok on the Soft and Moist Diet include:  o Slow transition:  o 1st week on soft protein should focus on: Yogurt, cottage cheese, eggs  o 2nd -4th  week on soft protein diet should focus on: yogurt, cottage cheese, eggs, canned tuna, canned chicken, tilapia, fish (needs to be soft enough to be cut up with a fork)  o 5th week on soft protein diet should focus on: Yogurt, cottage cheese, eggs, canned tuna, canned chicken, tilapia, fish, salmon, chicken breast, or turkey. Remember to continue to get 64 ounces of fluid daily on ALL Stages. To be advanced to Bariatric Maintenance Stage of the bariatric diet, follow up with the dietitian 6 weeks post-op, around:         For any additional questions, please refer to your blue binder that was provided to you at the start of the bariatric program.

## 2021-06-24 NOTE — PROGRESS NOTES
Problem: Falls - Risk of  Goal: *Absence of Falls  Description: Document Cherelle Randolph Fall Risk and appropriate interventions in the flowsheet.   Outcome: Progressing Towards Goal  Note: Fall Risk Interventions:            Medication Interventions: Patient to call before getting OOB, Evaluate medications/consider consulting pharmacy                   Problem: Patient Education: Go to Patient Education Activity  Goal: Patient/Family Education  Outcome: Progressing Towards Goal     Problem: Patient Education: Go to Patient Education Activity  Goal: Patient/Family Education  Outcome: Progressing Towards Goal     Problem: Laparoscopic Gastric Bypass:Post-Op Day 1  Goal: Off Pathway (Use only if patient is Off Pathway)  Outcome: Progressing Towards Goal  Goal: Activity/Safety  Outcome: Progressing Towards Goal  Goal: Diagnostic Test/Procedures  Outcome: Progressing Towards Goal  Goal: Nutrition/Diet  Outcome: Progressing Towards Goal  Goal: Discharge Planning  Outcome: Progressing Towards Goal  Goal: Medications  Outcome: Progressing Towards Goal  Goal: Respiratory  Outcome: Progressing Towards Goal  Goal: Treatments/Interventions/Procedures  Outcome: Progressing Towards Goal  Goal: Psychosocial  Outcome: Progressing Towards Goal  Goal: *No signs and symptoms of infection or wound complications  Outcome: Progressing Towards Goal  Goal: *Optimal pain control at patient's stated goal  Outcome: Progressing Towards Goal  Goal: *Adequate urinary output (equal to or greater than 30 milliliters/hour)  Outcome: Progressing Towards Goal  Goal: *Hemodynamically stable  Outcome: Progressing Towards Goal  Goal: *Tolerating diet  Outcome: Progressing Towards Goal  Goal: *Demonstrates progressive activity  Outcome: Progressing Towards Goal  Goal: *Absence of signs and symptoms of DVT  Outcome: Progressing Towards Goal  Goal: *Labs within defined limits  Outcome: Progressing Towards Goal  Goal: *Oxygen saturation within defined limits  Outcome: Progressing Towards Goal  Goal: *Upper GI series/No leak identified  Outcome: Progressing Towards Goal     Problem: Laparoscopic Gastric Bypass:Post-Op Day 2  Goal: Off Pathway (Use only if patient is Off Pathway)  Outcome: Progressing Towards Goal  Goal: Activity/Safety  Outcome: Progressing Towards Goal  Goal: Diagnostic Test/Procedures  Outcome: Progressing Towards Goal  Goal: Nutrition/Diet  Outcome: Progressing Towards Goal  Goal: Discharge Planning  Outcome: Progressing Towards Goal  Goal: Medications  Outcome: Progressing Towards Goal  Goal: Respiratory  Outcome: Progressing Towards Goal  Goal: Treatments/Interventions/Procedures  Outcome: Progressing Towards Goal  Goal: Psychosocial  Outcome: Progressing Towards Goal     Problem: Laparoscopic Gastric Bypass:Discharge Outcomes  Goal: *Active bowel sounds  Outcome: Progressing Towards Goal  Goal: *Absence of signs and symptoms of DVT  Outcome: Progressing Towards Goal  Goal: *Hemodynamically stable  Outcome: Progressing Towards Goal  Goal: *Lungs clear or at baseline  Outcome: Progressing Towards Goal  Goal: *Demonstrates independent activity or return to baseline  Outcome: Progressing Towards Goal  Goal: *Optimal pain control at patient's stated goal  Outcome: Progressing Towards Goal  Goal: *Verbalizes understanding and describes prescribed diet  Outcome: Progressing Towards Goal  Goal: *Tolerating diet  Outcome: Progressing Towards Goal  Goal: *Verbalizes name, dosage, time, side effects, and number of days to continue medications  Outcome: Progressing Towards Goal  Goal: *No signs and symptoms of infection or wound complications  Outcome: Progressing Towards Goal  Goal: *Anxiety reduced or absent  Outcome: Progressing Towards Goal  Goal: *Understands and describes signs and symptoms to report to providers (eg: s/s of leak, DVT; inability to tolerate diet)  Outcome: Progressing Towards Goal  Goal: *Describes follow-up/return visits to physicians  Outcome: Progressing Towards Goal  Goal: *Describes available resources and support systems  Outcome: Progressing Towards Goal

## 2021-06-24 NOTE — PROGRESS NOTES
Surgery Progress Note    6/24/2021    Admit Date: 6/23/2021    Subjective:     Patient has complaints of none Pain is controlled with current plan. Patient has been ambulating in halls. She reports no nausea and no vomiting. Bowel Movements: None    Objective:     Blood pressure 137/78, pulse 92, temperature 97.6 °F (36.4 °C), resp. rate 16, height 5' 8\" (1.727 m), weight 115.7 kg (255 lb), last menstrual period 06/07/2021, SpO2 96 %. No intake/output data recorded.     06/22 1901 - 06/24 0700  In: 6977.5 [I.V.:6977.5]  Out: 1050 [Urine:1050]    EXAM: GENERAL: alert, pleasant, no distress   HEART: regular rate and rhythm   LUNGS: clear to auscultation   ABDOMEN:  Soft, obese, non tender, non distended, incisions clean, dry, no erythema or drainage   EXTREMITIES: warm, well perfused    Data Review    Recent Results (from the past 24 hour(s))   GLUCOSE, POC    Collection Time: 06/23/21 10:25 AM   Result Value Ref Range    Glucose (POC) 109 70 - 110 mg/dL   CBC W/O DIFF    Collection Time: 06/24/21  3:41 AM   Result Value Ref Range    WBC 12.9 4.6 - 13.2 K/uL    RBC 4.05 (L) 4.20 - 5.30 M/uL    HGB 11.8 (L) 12.0 - 16.0 g/dL    HCT 35.6 35.0 - 45.0 %    MCV 87.9 74.0 - 97.0 FL    MCH 29.1 24.0 - 34.0 PG    MCHC 33.1 31.0 - 37.0 g/dL    RDW 13.5 11.6 - 14.5 %    PLATELET 896 538 - 263 K/uL    MPV 10.1 9.2 - 11.8 FL   MAGNESIUM    Collection Time: 06/24/21  3:41 AM   Result Value Ref Range    Magnesium 1.8 1.6 - 2.6 mg/dL   METABOLIC PANEL, BASIC    Collection Time: 06/24/21  3:41 AM   Result Value Ref Range    Sodium 136 136 - 145 mmol/L    Potassium 3.9 3.5 - 5.5 mmol/L    Chloride 104 100 - 111 mmol/L    CO2 25 21 - 32 mmol/L    Anion gap 7 3.0 - 18 mmol/L    Glucose 110 (H) 74 - 99 mg/dL    BUN 8 7.0 - 18 MG/DL    Creatinine 0.52 (L) 0.6 - 1.3 MG/DL    BUN/Creatinine ratio 15 12 - 20      GFR est AA >60 >60 ml/min/1.73m2    GFR est non-AA >60 >60 ml/min/1.73m2    Calcium 8.2 (L) 8.5 - 10.1 MG/DL       Assessment: Ren Esposito is a 38 y.o. female,  day 1 status post Laparoscopic Barb-en-Y gastric bypass utilizing a 15 mL separate tubularized gastric pouch based on the lesser curvature of the stomach, a 272-PM retrocolic/retrogastric Barb limb, and a 40-cm biliopancreatic limb. .  Condition: good    Plan:   -Ambulate every four hours  - Pain managed  prior to d/c   -Nausea managed prior to d/c   -Advance to Clear liquid Gastric Bypass Diet, if able to tolerate clear liquid diet (with pro shake) 4oz per hour for 3 hours prior to d/c    If patient continue to progress d/c home later today     Spencer Florian, ELSA  8:31 AM  6/24/2021

## 2021-06-24 NOTE — PROGRESS NOTES
Problem: Falls - Risk of  Goal: *Absence of Falls  Description: Document Onalee Gum Fall Risk and appropriate interventions in the flowsheet.   Outcome: Progressing Towards Goal  Note: Fall Risk Interventions:            Medication Interventions: Patient to call before getting OOB

## 2021-06-24 NOTE — PROGRESS NOTES
D/C order noted for today. Orders reviewed. No needs identified at this time.  CM remains available if needed

## 2021-06-24 NOTE — DIABETES MGMT
GLYCEMIC CONTROL:    Received consult to see patient prior to disch today. POD#1 LAPAROSCOPIC BRITTNEE-EN-Y GASTRIC BYPASS    Patient reported that she was placed on Metformin for PCOS and she's actually pre diabetic. Recommendation: None.     Parminder Denis RN [facility]  Pager: [phone]

## 2021-06-24 NOTE — PROGRESS NOTES
Problem: Falls - Risk of  Goal: *Absence of Falls  Description: Document Mary Hercules Fall Risk and appropriate interventions in the flowsheet.   Outcome: Progressing Towards Goal  Note: Fall Risk Interventions:            Medication Interventions: Patient to call before getting OOB, Teach patient to arise slowly                   Problem: Patient Education: Go to Patient Education Activity  Goal: Patient/Family Education  Outcome: Progressing Towards Goal     Problem: Patient Education: Go to Patient Education Activity  Goal: Patient/Family Education  Outcome: Progressing Towards Goal     Problem: Laparoscopic Gastric Bypass:Day of Surgery  Goal: Off Pathway (Use only if patient is Off Pathway)  Outcome: Progressing Towards Goal  Goal: Activity/Safety  Outcome: Progressing Towards Goal  Goal: Consults, if ordered  Outcome: Progressing Towards Goal  Goal: Diagnostic Test/Procedures  Outcome: Progressing Towards Goal  Goal: Nutrition/Diet  Outcome: Progressing Towards Goal  Goal: Medications  Outcome: Progressing Towards Goal  Goal: Respiratory  Outcome: Progressing Towards Goal  Goal: Treatments/Interventions/Procedures  Outcome: Progressing Towards Goal  Goal: Psychosocial  Outcome: Progressing Towards Goal  Goal: *No signs and symptoms of infection or wound complications  Outcome: Progressing Towards Goal  Goal: *Optimal pain control at patient's stated goal  Outcome: Progressing Towards Goal  Goal: *Adequate urinary output (equal to or greater than 30 milliliters/hour)  Outcome: Progressing Towards Goal  Goal: *Hemodynamically stable  Outcome: Progressing Towards Goal  Goal: *Tolerating diet  Outcome: Progressing Towards Goal  Goal: *Demonstrates progressive activity  Outcome: Progressing Towards Goal  Goal: *Absence of signs and symptoms of DVT  Outcome: Progressing Towards Goal  Goal: *Labs within defined limits  Outcome: Progressing Towards Goal  Goal: *Oxygen saturation within defined limits  Outcome: Progressing Towards Goal     Problem: Laparoscopic Gastric Bypass:Post-Op Day 1  Goal: Off Pathway (Use only if patient is Off Pathway)  Outcome: Progressing Towards Goal  Goal: Activity/Safety  Outcome: Progressing Towards Goal  Goal: Diagnostic Test/Procedures  Outcome: Progressing Towards Goal  Goal: Nutrition/Diet  Outcome: Progressing Towards Goal  Goal: Discharge Planning  Outcome: Progressing Towards Goal  Goal: Medications  Outcome: Progressing Towards Goal  Goal: Respiratory  Outcome: Progressing Towards Goal  Goal: Treatments/Interventions/Procedures  Outcome: Progressing Towards Goal  Goal: Psychosocial  Outcome: Progressing Towards Goal  Goal: *No signs and symptoms of infection or wound complications  Outcome: Progressing Towards Goal  Goal: *Optimal pain control at patient's stated goal  Outcome: Progressing Towards Goal  Goal: *Adequate urinary output (equal to or greater than 30 milliliters/hour)  Outcome: Progressing Towards Goal  Goal: *Hemodynamically stable  Outcome: Progressing Towards Goal  Goal: *Tolerating diet  Outcome: Progressing Towards Goal  Goal: *Demonstrates progressive activity  Outcome: Progressing Towards Goal  Goal: *Absence of signs and symptoms of DVT  Outcome: Progressing Towards Goal  Goal: *Labs within defined limits  Outcome: Progressing Towards Goal  Goal: *Oxygen saturation within defined limits  Outcome: Progressing Towards Goal  Goal: *Upper GI series/No leak identified  Outcome: Progressing Towards Goal     Problem: Laparoscopic Gastric Bypass:Post-Op Day 2  Goal: Off Pathway (Use only if patient is Off Pathway)  Outcome: Progressing Towards Goal  Goal: Activity/Safety  Outcome: Progressing Towards Goal  Goal: Diagnostic Test/Procedures  Outcome: Progressing Towards Goal  Goal: Nutrition/Diet  Outcome: Progressing Towards Goal  Goal: Discharge Planning  Outcome: Progressing Towards Goal  Goal: Medications  Outcome: Progressing Towards Goal  Goal: Respiratory  Outcome: Progressing Towards Goal  Goal: Treatments/Interventions/Procedures  Outcome: Progressing Towards Goal  Goal: Psychosocial  Outcome: Progressing Towards Goal     Problem: Laparoscopic Gastric Bypass:Discharge Outcomes  Goal: *Active bowel sounds  Outcome: Progressing Towards Goal  Goal: *Absence of signs and symptoms of DVT  Outcome: Progressing Towards Goal  Goal: *Hemodynamically stable  Outcome: Progressing Towards Goal  Goal: *Lungs clear or at baseline  Outcome: Progressing Towards Goal  Goal: *Demonstrates independent activity or return to baseline  Outcome: Progressing Towards Goal  Goal: *Optimal pain control at patient's stated goal  Outcome: Progressing Towards Goal  Goal: *Verbalizes understanding and describes prescribed diet  Outcome: Progressing Towards Goal  Goal: *Tolerating diet  Outcome: Progressing Towards Goal  Goal: *Verbalizes name, dosage, time, side effects, and number of days to continue medications  Outcome: Progressing Towards Goal  Goal: *No signs and symptoms of infection or wound complications  Outcome: Progressing Towards Goal  Goal: *Anxiety reduced or absent  Outcome: Progressing Towards Goal  Goal: *Understands and describes signs and symptoms to report to providers (eg: s/s of leak, DVT; inability to tolerate diet)  Outcome: Progressing Towards Goal  Goal: *Describes follow-up/return visits to physicians  Outcome: Progressing Towards Goal  Goal: *Describes available resources and support systems  Outcome: Progressing Towards Goal

## 2021-06-24 NOTE — PROGRESS NOTES
End of Shift Note     Bedside and verbal shift change report given to Brooks Johnson (On coming nurse) by Blanka Morris RN (Off going nurse).   Report included the following information:      --Procedure Summary     --MAR,     --Recent Results     --Med Rec Status

## 2021-06-24 NOTE — PROGRESS NOTES
Reason for Admission:  Morbid obesity ([facility], [address] [E66.01]                 RUR Score:    8            Plan for utilizing home health:    n/a                      Likelihood of Readmission:   LOW                         Transition of Care Plan:              Initial assessment completed with patient. Cognitive status of patient: oriented to time, place, person and situation. Face sheet information confirmed:  yes. The patient designates boyfriend to participate in her discharge plan and to receive any needed information. This patient lives in a single family home with . Patient is able to navigate steps as needed. Prior to hospitalization, patient was considered to be independent with ADLs/IADLS : yes . Patient has a current ACP document on file: no      Healthcare Decision Maker:     Click here to complete [address] including selection of the Healthcare Decision Maker Relationship (ie \"Primary\")    The boyfriend will be available to transport patient home upon discharge. The patient already has none reported, and  medical equipment available in the home. Patient is not currently active with home health. Patient has not stayed in a skilled nursing facility or rehab. Was  stay within last 60 days : no. This patient is on dialysis :no   Currently, the discharge plan is Home. The patient states that she can obtain her medications from the pharmacy, and take her medications as directed. Patient's current insurance is [employer]       Care Management Interventions  PCP Verified by CM:  Yes  Mode of Transport at Discharge: Self  Transition of Care Consult (CM Consult): Discharge Planning  Current Support Network: Own Home  Confirm Follow Up Transport: Self  The Plan for Transition of Care is Related to the Following Treatment Goals : home  Discharge Location  Discharge Placement: [address] Orr RN BSN  Outcomes Manager    Pager # [phone]

## 2021-06-25 ENCOUNTER — PATIENT OUTREACH (OUTPATIENT)
Dept: OTHER | Age: 36
End: 2021-06-25

## 2021-06-25 NOTE — PROGRESS NOTES
6/25/2021, Per chart review, S/P [facility] 6/23/2021 - 6/24/2021 related to Obesity/Gastric Bypass  Reached associate at listed phone number, HIPAA verified. Star allowed this writer/ACM to explain ACM role/purpose of outreach to assist with Transitions of Care/Case Management support as needed. 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 Instruction provided on importance of continued health maintenance/follow-up appts and [employer] platform. Vipul verbalizes understanding, agrees to notify MD of changes or concerns and agrees to keep this ACM contact information to call with further questions or concerns. Introduced [employer] for patient. Patient does not identify any Care Management needs at this time and declines services.       POSTOPERATIVE DIAGNOSES:  Clinically severe obesity with a body mass index of 39 with obesity-related comorbidities consisting of gastroesophageal reflux disease, reactive airway disease, stress urinary incontinence, chronic obstructive sleep apnea, and polycystic ovarian syndrome.     PROCEDURE PERFORMED:  Laparoscopic Barb-en-Y gastric bypass utilizing a 15 mL separate tubularized gastric pouch based on the lesser curvature of the stomach, a 028-HO retrocolic/retrogastric Barb limb, and a 40-cm biliopancreatic limb.     SURGEON:  Flower Yeh, DO  Special Instructions:            - BG driving until activity is not influenced by incisional pain and off narcotics            - No bath or hot tub until wounds are healed            - Check pulse and temperature twice daily for 10 days  [facility] for a Temp >100.5 or Pulse>115     Follow-up with your surgeon in 2 weeks, call office for appointment [phone] ([phone] ) [address] [facility])

## 2021-06-30 ENCOUNTER — HOSPITAL ENCOUNTER (OUTPATIENT)
Dept: LAB | Age: 36
Discharge: HOME | End: 2021-06-30
Payer: COMMERCIAL

## 2021-06-30 ENCOUNTER — OFFICE VISIT (OUTPATIENT)
Dept: SURGERY | Age: 36
End: 2021-06-30
Payer: COMMERCIAL

## 2021-06-30 ENCOUNTER — HOSPITAL ENCOUNTER (OUTPATIENT)
Dept: GENERAL RADIOLOGY | Age: 36
Discharge: HOME | End: 2021-06-30
Payer: COMMERCIAL

## 2021-06-30 VITALS
HEIGHT: 68 IN | RESPIRATION RATE: 18 BRPM | SYSTOLIC BLOOD PRESSURE: 118 MMHG | HEART RATE: 82 BPM | WEIGHT: 261 LBS | BODY MASS INDEX: 39.56 KG/M2 | TEMPERATURE: 97.8 F | DIASTOLIC BLOOD PRESSURE: 76 MMHG | OXYGEN SATURATION: 98 %

## 2021-06-30 DIAGNOSIS — Z98.84 S/P GASTRIC BYPASS: ICD-10-CM

## 2021-06-30 DIAGNOSIS — K91.2 POST-RESECTION MALABSORPTION: ICD-10-CM

## 2021-06-30 DIAGNOSIS — R10.11 RIGHT UPPER QUADRANT ABDOMINAL PAIN: ICD-10-CM

## 2021-06-30 DIAGNOSIS — Z09 POSTOPERATIVE EXAMINATION: ICD-10-CM

## 2021-06-30 DIAGNOSIS — K59.00 CONSTIPATION, UNSPECIFIED CONSTIPATION TYPE: ICD-10-CM

## 2021-06-30 DIAGNOSIS — Z09 POSTOPERATIVE EXAMINATION: Primary | ICD-10-CM

## 2021-06-30 LAB
ALBUMIN SERPL-MCNC: 3.2 G/DL (ref 3.4–5)
ALBUMIN/GLOB SERPL: 0.7 {RATIO} (ref 0.8–1.7)
ALP SERPL-CCNC: 79 U/L (ref 45–117)
ALT SERPL-CCNC: 24 U/L (ref 13–56)
ANION GAP SERPL CALC-SCNC: 10 MMOL/L (ref 3–18)
APPEARANCE UR: CLEAR
AST SERPL-CCNC: 13 U/L (ref 10–38)
BACTERIA URNS QL MICRO: NEGATIVE /HPF
BASOPHILS # BLD: 0 K/UL (ref 0–0.1)
BASOPHILS NFR BLD: 0 % (ref 0–2)
BILIRUB SERPL-MCNC: 0.4 MG/DL (ref 0.2–1)
BILIRUB UR QL: NEGATIVE
BUN SERPL-MCNC: 9 MG/DL (ref 7–18)
BUN/CREAT SERPL: 12 (ref 12–20)
CALCIUM SERPL-MCNC: 8.5 MG/DL (ref 8.5–10.1)
CHLORIDE SERPL-SCNC: 104 MMOL/L (ref 100–111)
CO2 SERPL-SCNC: 24 MMOL/L (ref 21–32)
COLOR UR: YELLOW
CREAT SERPL-MCNC: 0.77 MG/DL (ref 0.6–1.3)
DIFFERENTIAL METHOD BLD: ABNORMAL
EOSINOPHIL # BLD: 0 K/UL (ref 0–0.4)
EOSINOPHIL NFR BLD: 0 % (ref 0–5)
EPITH CASTS URNS QL MICRO: NORMAL /LPF (ref 0–5)
ERYTHROCYTE [DISTWIDTH] IN BLOOD BY AUTOMATED COUNT: 13.2 % (ref 11.6–14.5)
GLOBULIN SER CALC-MCNC: 4.8 G/DL (ref 2–4)
GLUCOSE SERPL-MCNC: 70 MG/DL (ref 74–99)
GLUCOSE UR STRIP.AUTO-MCNC: NEGATIVE MG/DL
HCT VFR BLD AUTO: 35.3 % (ref 35–45)
HGB BLD-MCNC: 11.7 G/DL (ref 12–16)
HGB UR QL STRIP: NEGATIVE
KETONES UR QL STRIP.AUTO: >160 MG/DL
LEUKOCYTE ESTERASE UR QL STRIP.AUTO: NEGATIVE
LYMPHOCYTES # BLD: 1.9 K/UL (ref 0.9–3.6)
LYMPHOCYTES NFR BLD: 20 % (ref 21–52)
MCH RBC QN AUTO: 28.7 PG (ref 24–34)
MCHC RBC AUTO-ENTMCNC: 33.1 G/DL (ref 31–37)
MCV RBC AUTO: 86.5 FL (ref 74–97)
MONOCYTES # BLD: 0.7 K/UL (ref 0.05–1.2)
MONOCYTES NFR BLD: 7 % (ref 3–10)
NEUTS SEG # BLD: 7.1 K/UL (ref 1.8–8)
NEUTS SEG NFR BLD: 73 % (ref 40–73)
NITRITE UR QL STRIP.AUTO: NEGATIVE
PH UR STRIP: 5.5 [PH] (ref 5–8)
PLATELET # BLD AUTO: 368 K/UL (ref 135–420)
PMV BLD AUTO: 9.6 FL (ref 9.2–11.8)
POTASSIUM SERPL-SCNC: 3.8 MMOL/L (ref 3.5–5.5)
PROT SERPL-MCNC: 8 G/DL (ref 6.4–8.2)
PROT UR STRIP-MCNC: ABNORMAL MG/DL
RBC # BLD AUTO: 4.08 M/UL (ref 4.2–5.3)
RBC #/AREA URNS HPF: NEGATIVE /HPF (ref 0–5)
SODIUM SERPL-SCNC: 138 MMOL/L (ref 136–145)
SP GR UR REFRACTOMETRY: 1.02 (ref 1–1.03)
UROBILINOGEN UR QL STRIP.AUTO: 1 EU/DL (ref 0.2–1)
WBC # BLD AUTO: 9.7 K/UL (ref 4.6–13.2)
WBC URNS QL MICRO: NORMAL /HPF (ref 0–4)

## 2021-06-30 PROCEDURE — 36415 COLL VENOUS BLD VENIPUNCTURE: CPT

## 2021-06-30 PROCEDURE — 99024 POSTOP FOLLOW-UP VISIT: CPT | Performed by: NURSE PRACTITIONER

## 2021-06-30 PROCEDURE — 85025 COMPLETE CBC W/AUTO DIFF WBC: CPT

## 2021-06-30 PROCEDURE — 74019 RADEX ABDOMEN 2 VIEWS: CPT

## 2021-06-30 PROCEDURE — 80053 COMPREHEN METABOLIC PANEL: CPT

## 2021-06-30 PROCEDURE — 81001 URINALYSIS AUTO W/SCOPE: CPT

## 2021-06-30 RX ORDER — LANOLIN ALCOHOL/MO/W.PET/CERES
1000 CREAM (GRAM) TOPICAL DAILY
COMMUNITY
End: 2022-06-14

## 2021-06-30 RX ORDER — LANOLIN ALCOHOL/MO/W.PET/CERES
CREAM (GRAM) TOPICAL DAILY
COMMUNITY
End: 2022-06-14

## 2021-06-30 NOTE — PROGRESS NOTES
Postop Progress Note    Subjective    Patrica Morris presents to the office for postop follow up. C/o of abd pain RUQ, wrosening, imprved by nothing, passing gas but no BM. Hx lap vilma with retained stone caused pt similar pain. S/p bypass 6/23. Constipation but has tried miralax and mag cit without improvement in s/s. Objective    Visit Vitals  /76   Pulse 82   Temp 97.8 °F (36.6 °C)   Resp 18   Ht 5' 8\" (1.727 m)   Wt 118.4 kg (261 lb)   LMP 06/16/2021 (Exact Date)   SpO2 98%   BMI 39.68 kg/m²     Physical Exam  Vitals and nursing note reviewed. Constitutional:       Appearance: She is obese. She is not ill-appearing or toxic-appearing. HENT:      Head: Normocephalic and atraumatic. Eyes:      Pupils: Pupils are equal, round, and reactive to light. Cardiovascular:      Rate and Rhythm: Normal rate. Heart sounds: Normal heart sounds. Pulmonary:      Effort: Pulmonary effort is normal.      Breath sounds: Normal breath sounds. Abdominal:      General: Bowel sounds are normal. There is no distension. Palpations: Abdomen is soft. There is no mass. Tenderness: There is abdominal tenderness. There is guarding. There is no rebound. Hernia: No hernia is present. Musculoskeletal:         General: Normal range of motion. Skin:     General: Skin is warm and dry. Neurological:      General: No focal deficit present. Mental Status: She is alert and oriented to person, place, and time.    Psychiatric:         Mood and Affect: Mood and affect normal.         Behavior: Behavior normal.           Assessment  Struggling post op with rapid onset abdominal pain, concern d/t hc retained stone and current pain mimicking that per pt, xray eval stool burden suspect constipation     Plan  CBC   CMP   US   Orders Placed This Encounter    XR ABD (AP AND ERECT OR DECUB)     Standing Status:   Future     Number of Occurrences:   1     Standing Expiration Date:   8/30/2021     Order Specific Question:   Is Patient Pregnant?      Answer:   No     Order Specific Question:   Reason for Exam     Answer:   abdominal pain RQU s/p gastric bypass 6/22/21 and s/p lap vilma 9/2020 and retained stone 11/2020    URINALYSIS W/ REFLEX CULTURE     Standing Status:   Future     Number of Occurrences:   1     Standing Expiration Date:   12/30/2021             Electronically signed by Alla Narvaez NP on 7/9/2021 at 4:02 PM

## 2021-06-30 NOTE — PROGRESS NOTES
Chief Complaint   Patient presents with    Post OP Follow Up     GBP june 23 2021     Pt ID confirmed    Weight Loss Metrics 6/30/2021 6/30/2021 6/23/2021 6/8/2021 5/13/2021 5/13/2021 4/14/2021   Pre op / Initial Wt 266 - - - 266 - -   Today's Wt - 261 lb - 255 lb - 266 lb 281 lb   BMI - 39.68 kg/m2 38.77 kg/m2 - - 40.45 kg/m2 42.73 kg/m2   Ideal Body Wt 143 - - - 142 - -   Excess Body Wt 123 - - - 124 - -   Goal Wt 168 - - - 167 - -   Wt loss to date 5 - - - 0 - -   % Wt Loss 0.05 - - - 0 - -   80% EBW 98.4 - - - 99.2 - -     1. Have you been to the ER, urgent care clinic since your last visit? Hospitalized since your last visit? No    2. Have you seen or consulted any other health care providers outside of the [address] [place] [address] since your last visit? Include any pap smears or colon screening. No  Body mass index is 39.68 kg/m².     Post op medications:  MVI: 2x  Calcium Citrate: 600 milligrams 3x day  Actigal 0  B-12 1000 micrograms   Vit D 3 5000 units thiamine 100 milligrams

## 2021-07-01 ENCOUNTER — HOSPITAL ENCOUNTER (OUTPATIENT)
Dept: ULTRASOUND IMAGING | Age: 36
Discharge: HOME | End: 2021-07-01
Attending: NURSE PRACTITIONER
Payer: COMMERCIAL

## 2021-07-01 DIAGNOSIS — Z09 POSTOPERATIVE EXAMINATION: ICD-10-CM

## 2021-07-01 DIAGNOSIS — K91.2 POST-RESECTION MALABSORPTION: ICD-10-CM

## 2021-07-01 DIAGNOSIS — Z98.84 S/P GASTRIC BYPASS: ICD-10-CM

## 2021-07-01 DIAGNOSIS — R10.11 RIGHT UPPER QUADRANT ABDOMINAL PAIN: ICD-10-CM

## 2021-07-01 DIAGNOSIS — K59.00 CONSTIPATION, UNSPECIFIED CONSTIPATION TYPE: ICD-10-CM

## 2021-07-01 PROCEDURE — 76705 ECHO EXAM OF ABDOMEN: CPT

## 2021-07-08 ENCOUNTER — OFFICE VISIT (OUTPATIENT)
Dept: SURGERY | Age: 36
End: 2021-07-08
Payer: COMMERCIAL

## 2021-07-08 VITALS
TEMPERATURE: 97.7 F | OXYGEN SATURATION: 96 % | HEART RATE: 70 BPM | BODY MASS INDEX: 38.19 KG/M2 | HEIGHT: 68 IN | SYSTOLIC BLOOD PRESSURE: 109 MMHG | WEIGHT: 252 LBS | DIASTOLIC BLOOD PRESSURE: 79 MMHG

## 2021-07-08 DIAGNOSIS — K91.2 POSTOPERATIVE INTESTINAL MALABSORPTION: Primary | ICD-10-CM

## 2021-07-08 PROCEDURE — 99024 POSTOP FOLLOW-UP VISIT: CPT | Performed by: SURGERY

## 2021-07-08 RX ORDER — BIOTIN 1000 MCG
TABLET,CHEWABLE ORAL
COMMUNITY
End: 2022-06-14

## 2021-07-08 NOTE — PROGRESS NOTES
Bariatric Follow-Up Evaluation    Lavern Jerome is a 38 y.o. black female presents in follow-up status post laparoscopic gastric bypass June 23, 2021 note expected decrease incisional discomfort no longer requiring analgesics and advised complaint. The patient did experience transient right upper quadrant abdominal pain which has subsequently nearly completely resolved and felt to be secondary to a musculoskeletal origin. Compliant with intolerant of a early post gastric bypass diet with appropriate early satiety no specific food intolerances or pathologic emesis. The patient is meeting her fluid and protein goals. The patient is not attempted density carbohydrates and therefore has not yet experienced dumping syndrome  Compliant with multivitamin, calcium citrate, vitamin B1, B12, and vitamin D supplementation  Exercise regimen: Treadmill approximately 30 minutes 3 times weekly  Comorbidities: Gastrosoft reflux disease, reactive airway disease, stricture incontinence, clinical obstructive sleep apnearesolved                           Weight related arthropathyimproved                           Polycystic ovarian syndromeno change  Preoperative weight: 266  Current weight: 2052.   BMI: 38.  Estimated weight loss: 98  Current weight loss: 14  Goal weight: 168  Percentage weight loss: 14% / 13 days    Weight Loss Metrics 7/8/2021 7/8/2021 6/30/2021 6/30/2021 6/23/2021 6/8/2021 5/13/2021   Pre op / Initial Wt 266 - 266 - - - 266   Today's Wt - 252 lb - 261 lb - 255 lb -   BMI - 38.32 kg/m2 - 39.68 kg/m2 38.77 kg/m2 - -   Ideal Body Wt 143 - 143 - - - 142   Excess Body Wt 123 - 123 - - - 124   Goal Wt 168 - 168 - - - 167   Wt loss to date 14 - 5 - - - 0   % Wt Loss 0.14 - 0.05 - - - 0   80% EBW 98.4 - 98.4 - - - 99.2       Allergies   Allergen Reactions    Latex Atopic Dermatitis    Nubain [Nalbuphine] Hives    Sulfa (Sulfonamide Antibiotics) Hives    Iodine Hives    Shellfish Derived Hives       Current Outpatient Medications on File Prior to Visit   Medication Sig Dispense Refill    biotin 1,000 mcg chew Take  by mouth.  multivitamin with iron (FLINTSTONES) chewable tablet Take 1 Tablet by mouth daily. 2x day      cyanocobalamin (Vitamin B-12) 1,000 mcg tablet Take 1,000 mcg by mouth daily.  thiamine HCL (B-1) 100 mg tablet Take  by mouth daily.  OTHER Calcium citrate 600 milligram 3x day      montelukast (SINGULAIR) 10 mg tablet Take 10 mg by mouth nightly.  cholecalciferol (VITAMIN D3) (1000 Units /25 mcg) tablet Take 5,000 Units by mouth daily.  tiotropium bromide (Spiriva Respimat) 2.5 mcg/actuation inhaler Take 2 Puffs by inhalation daily.  EPIPEN 2-BOB 0.3 mg/0.3 mL injection INJECT 1 PEN INTO LATERAL THIGH FOR SYMPTOMS OF ANAPHYLAXIS.  0    albuterol sulfate (PROAIR RESPICLICK) 90 mcg/actuation aepb Take 2 Puffs by inhalation every four (4) hours as needed. 1 Inhaler 12    cetirizine (ZYRTEC) 10 mg tablet Take  by mouth.  ondansetron (ZOFRAN ODT) 4 mg disintegrating tablet Take 1 Tablet by mouth every eight (8) hours as needed for Nausea or Vomiting. (Patient not taking: Reported on 7/8/2021) 15 Tablet 0    fluvoxaMINE (LUVOX) 100 mg tablet  (Patient not taking: Reported on 6/30/2021)      therapeutic multivitamin (THEREMS) tablet Take 1 Tab by mouth. (Patient not taking: Reported on 6/30/2021)       No current facility-administered medications on file prior to visit.        Past Medical History:   Diagnosis Date    Anxiety     Asthma     COVID-19 vaccine series completed 02/2021    Environmental allergies     GERD (gastroesophageal reflux disease)     HLD (hyperlipidemia)     HSV-2 infection     Nausea & vomiting     MILD NAUSEA ONLY    OCD (obsessive compulsive disorder)     PCOS (polycystic ovarian syndrome)     Prediabetes     Vitamin D deficiency        Past Surgical History:   Procedure Laterality Date    HX BREAST LUMPECTOMY Left 2013    HX CHOLECYSTECTOMY      HX GI      gastric bypass    HX KNEE ARTHROSCOPY Right 2010    HX OTHER SURGICAL  06/25/2019    excsion of pilonida cyst     HX WISDOM TEETH EXTRACTION  2003       Social History     Tobacco Use    Smoking status: Never Smoker    Smokeless tobacco: Never Used   Vaping Use    Vaping Use: Never used   Substance Use Topics    Alcohol use: Not Currently     Comment: 1 glass of wine a month    Drug use: No       Family History   Problem Relation Age of Onset    Asthma Mother     GERD Mother     Substance Abuse Mother     Diabetes Father     Sleep Apnea Father     Hypertension Father     Elevated Lipids Father     Alcohol abuse Father     GERD Brother     Cancer Paternal Grandmother         throat 2/2 smoking       ROS:  General: Negative for fevers, chills, night sweats, fatigue, weight loss, or weight gain. GI: Negative for abdominal pain, change in bowel habits, hematochezia, melena, or GERD. Integumentary: Negative for dermatitis or abnormal moles. HEENT: Negative for visual changes, vertigo, epistaxis, dysphagia, or hoarseness    Cardiac: Negative for chest pain, palpitations, hypertension, edema, or varicosities    Resp: Negative for cough, shortness of breath, wheezing, hemoptysis, snoring, or reactive airway disease    : Negative for hematuria, dysuria, frequency, urgency, nocturia, or stress urinary incontinence    MSK:  Negative for weakness, joint pain, or arthritis    Endocrine: Negative for diabetes, thyroid disease, polyuria, polydipsia, polyphagia, poor wound, heat intolerance, or cold intolerance. Lymph/Heme: Negative for anemia, bruising, or history of blood transfusions. Neuro:  Negative for dizziness, headache, fainting, seizures, and stroke.     Psychiatry:  Negative for anxiety or depression    Physical Exam    Visit Vitals  /79 (BP 1 Location: Left upper arm, BP Patient Position: Sitting)   Pulse 70   Temp 97.7 °F (36.5 °C)   Ht 5' 8\" (1.727 m)   Wt 114.3 kg (252 lb)   LMP 06/16/2021 (Exact Date)   SpO2 96%   BMI 38.32 kg/m²       Nursing note reviewed. General: 38 y.o. female is in no acute distress. Head: Normocephalic, atraumatic  Resp: Clear to auscultation bilaterally, no wheezing, rhonchi, or rales, excursions normal and symmetrical.  Cardio: Regular rate and rhythm, no murmurs, clicks, gallops, or rubs. No edema or varicosities. Abdomen: Obese, soft, nontender, nondistended, normoactive bowel sounds, no hernias, no hepatosplenomegaly, wounds clean dry approximated with appropriate surrounding induration incisional tenderness without evidence of infectious complications or incisional hernia  Psych: Alert and oriented to person, place, and time.     Impression    Status post laparoscopic gastric bypass June 23, 2011 with appropriate weight loss and comorbidity resolution      Plan    Bariatric nutrition evaluation for assistance with advancement of diet  Continue compliance with the bariatric early post gastric bypass diet, vitamin supplementation protocol, increase exercise regimen to 30 minutes on daily basis, encourage monthly attendance at bariatric support group meetings  Follow-up September 2021 with labs for 3-month bariatric surgical evaluation

## 2021-07-08 NOTE — PROGRESS NOTES
Everardo Hagan is a 38 y.o. female (: 1985) presenting to address:    Chief Complaint   Patient presents with    Surgical Follow-up     LGBP performed on 21       Medication list and allergies have been reviewed with Everardo Hagan and updated as of today's date. I have gone over all Medical, Surgical and Social History with Everardo Bentley and updated/added the information accordingly. 1. Have you been to the ER, Urgent Care or Hospitalized since your last visit? NO      2. Have you followed up with your PCP or any other Physicians since your procedure/ last office visit?    NO

## 2021-08-03 PROBLEM — E66.01 OBESITY, MORBID (HCC): Status: RESOLVED | Noted: 2018-04-10 | Resolved: 2021-08-03

## 2021-08-05 ENCOUNTER — HOSPITAL ENCOUNTER (OUTPATIENT)
Dept: BARIATRICS/WEIGHT MGMT | Age: 36
Discharge: HOME | End: 2021-08-05

## 2021-08-05 ENCOUNTER — DOCUMENTATION ONLY (OUTPATIENT)
Dept: BARIATRICS/WEIGHT MGMT | Age: 36
End: 2021-08-05

## 2021-08-05 NOTE — PROGRESS NOTES
[facility]  POST-OP NUTRITION FOLLOW UP    Patient's Name: Mariaa Cordova  YOB: 1985  Surgery Date: 2021      Procedure: LGBP    Surgeon: Amanda Maradiaga    Height: 68\"     Pre-Op Weight: 266 lbs     Current Weight: 238 lbs  Weight Lost: 28 lbs    BMI:  36.1    Attendance of support group:     Complications  Readmittance: no  Reoperations: no  Complications: no  IV Fluids: no  ER Trips: no    Problem Areas:   Nausea: yes, with protein shakes or crystal light   Vomiting: no   Dumping Syndrome: no  Inadequate Protein: no  Inadequate Fluids: no  Food Intolerance: crystal light, creamy protein shakes  Hunger: right before meals, body is starting to get on track  Constipation: not usually     Eating 3 Meals/Day: yes, struggling with breakfast  Portion Size at Meals: 1 oz     Protein from Food: chicken, turkey, white fish, diced ham    Foods being consumed:  Breakfast: Time:900  chicken  Lunch: Time: 1200   turkey  Dinner:  Time: 600   White fish  In-between eating: protein shake    Length of time for meals: 30    food/fluids: yes    Fluids: 64+ oz/day   Types of Fluids: water, unsweet decaf tea    MVI: yes    Number/Day: 2   Taken Separately: flintstones    Vitamin B1: yes  Dosin mg    Calcium: soft chews    Calcium Dosin mg    Taken Separately: yes, 3x    Vitamin B12:  yes  Vitamin B12 Dosin mcg    Vitamin D: yes     Vitamin D dosin iu    Iron:     Iron dosing:      Protein Supplement: GenPro      Grams of Protein: 30g   Mixed with: unsweet decaf tea     Splitting Protein Drink in /2: not currently, but will start    Timing of Protein Drinks: in the morning  Patient is taking 7 days a week. Exercise: walking daily. Comments:    Patient is doing well and following through with the diet progression. Has a good plan to introducing veggies into her diet.  No dumping syndrome, getting all her fluids in and found a protein shake she can tolerate. Patient was educated on the importance of eating meat and vegetables only. I have talked with patient about the effects of carbohydrates, not only from a weight management perspective, but also what effects it could have on their blood sugar and what reactive hypoglycemia is. Diet Follow Up:  9 month class scheduled for: will call to schedule.        Arturo Cade, DUTCH  8/5/2021

## 2021-09-10 ENCOUNTER — TELEPHONE (OUTPATIENT)
Dept: SURGERY | Facility: CLINIC | Age: 36
End: 2021-09-10

## 2021-09-13 ENCOUNTER — OFFICE VISIT (OUTPATIENT)
Dept: SURGERY | Age: 36
End: 2021-09-13
Payer: COMMERCIAL

## 2021-09-13 VITALS
HEIGHT: 68 IN | WEIGHT: 219 LBS | TEMPERATURE: 97.3 F | OXYGEN SATURATION: 98 % | SYSTOLIC BLOOD PRESSURE: 138 MMHG | HEART RATE: 62 BPM | BODY MASS INDEX: 33.19 KG/M2 | DIASTOLIC BLOOD PRESSURE: 88 MMHG

## 2021-09-13 DIAGNOSIS — Z98.84 S/P GASTRIC BYPASS: ICD-10-CM

## 2021-09-13 DIAGNOSIS — R11.0 NAUSEA: ICD-10-CM

## 2021-09-13 DIAGNOSIS — R10.13 EPIGASTRIC PAIN: ICD-10-CM

## 2021-09-13 DIAGNOSIS — K91.2 POST-RESECTION MALABSORPTION: Primary | ICD-10-CM

## 2021-09-13 DIAGNOSIS — E66.9 OBESITY (BMI 30-39.9): ICD-10-CM

## 2021-09-13 DIAGNOSIS — E86.0 DEHYDRATION: ICD-10-CM

## 2021-09-13 PROCEDURE — 99024 POSTOP FOLLOW-UP VISIT: CPT | Performed by: NURSE PRACTITIONER

## 2021-09-13 RX ORDER — ONDANSETRON 4 MG/1
4 TABLET, ORALLY DISINTEGRATING ORAL
Qty: 15 TABLET | Refills: 0 | Status: SHIPPED | OUTPATIENT
Start: 2021-09-13 | End: 2021-12-07

## 2021-09-13 RX ORDER — ETONOGESTREL AND ETHINYL ESTRADIOL 11.7; 2.7 MG/1; MG/1
INSERT, EXTENDED RELEASE VAGINAL
COMMUNITY
End: 2021-12-14

## 2021-09-13 NOTE — PROGRESS NOTES
Crystal Newell is a 43 y.o. female (: 1985) presenting to address:    Chief Complaint   Patient presents with    Follow-up     Abdominal pain when eating or drinking       Medication list and allergies have been reviewed with Crystal Newell and updated as of today's date. I have gone over all Medical, Surgical and Social History with Crystal Newell and updated/added the information accordingly. 1. Have you been to the ER, Urgent Care or Hospitalized since your last visit? NO      2. Have you followed up with your PCP or any other Physicians since your procedure/ last office visit?    NO

## 2021-09-13 NOTE — PROGRESS NOTES
CC: Epigastric Pain  HPI:  Willow Denis is a 43 y.o. female s/p LGBP performed on 6/23/2021. Was tolerating solids and bariatric diet until one week ago. On 9/6 she woke up with a dull epigastric pain that has not gone away. Pain will increase with any PO intake and will go back to dull ache about 10 min later. Also reporting nausea with the pain. Pain with PO intake has gradually increased so she has cut out any solids and only drinking water, protein powder in water, and broth, getting in about 30 oz fluids daily due to pain. She is taking her vitamin supplementation daily. Denies vomiting, fever, diarrhea. Review of Systems   Constitutional: Positive for malaise/fatigue and weight loss. Respiratory: Negative for cough and shortness of breath. Cardiovascular: Negative for chest pain and palpitations. Gastrointestinal: Positive for abdominal pain, constipation and nausea. Negative for diarrhea, heartburn and vomiting. Neurological: Negative for dizziness, tingling and headaches. Physical Exam  Constitutional:       Appearance: She is obese. HENT:      Head: Normocephalic and atraumatic. Cardiovascular:      Rate and Rhythm: Normal rate and regular rhythm. Pulses: Normal pulses. Heart sounds: Normal heart sounds. Pulmonary:      Effort: Pulmonary effort is normal.      Breath sounds: Normal breath sounds. Abdominal:      General: Bowel sounds are normal. There is no distension. Palpations: Abdomen is soft. There is no mass. Tenderness: There is abdominal tenderness (Epigastric). Musculoskeletal:         General: Normal range of motion. Skin:     General: Skin is warm and dry. Neurological:      General: No focal deficit present. Mental Status: She is alert and oriented to person, place, and time. Psychiatric:         Mood and Affect: Mood normal.         Behavior: Behavior normal.       ASSESSMENT and PLAN  Most likely narrowing at anastomosis. Differentials could also include gastritis. She has Nexium 40 mg at home and will start this tomorrow morning. Outpatient fluids in next 24 hours. Zofran 4 mg PO PRN  GI referral for EGD  Continue to push PO fluids as tolerated.

## 2021-09-16 DIAGNOSIS — Z98.84 S/P GASTRIC BYPASS: ICD-10-CM

## 2021-09-16 DIAGNOSIS — R11.0 NAUSEA: ICD-10-CM

## 2021-09-16 DIAGNOSIS — E66.9 OBESITY (BMI 30-39.9): ICD-10-CM

## 2021-09-16 DIAGNOSIS — R10.13 EPIGASTRIC PAIN: ICD-10-CM

## 2021-09-16 DIAGNOSIS — K91.2 POST-RESECTION MALABSORPTION: Primary | ICD-10-CM

## 2021-09-17 ENCOUNTER — HOSPITAL ENCOUNTER (OUTPATIENT)
Dept: GENERAL RADIOLOGY | Age: 36
Discharge: HOME | End: 2021-09-17
Attending: NURSE PRACTITIONER
Payer: COMMERCIAL

## 2021-09-17 ENCOUNTER — HOSPITAL ENCOUNTER (OUTPATIENT)
Dept: LAB | Age: 36
Discharge: HOME | End: 2021-09-17

## 2021-09-17 DIAGNOSIS — R10.13 EPIGASTRIC PAIN: ICD-10-CM

## 2021-09-17 DIAGNOSIS — R11.0 NAUSEA: ICD-10-CM

## 2021-09-17 DIAGNOSIS — K91.2 POST-RESECTION MALABSORPTION: ICD-10-CM

## 2021-09-17 DIAGNOSIS — E66.9 OBESITY (BMI 30-39.9): ICD-10-CM

## 2021-09-17 DIAGNOSIS — Z98.84 S/P GASTRIC BYPASS: ICD-10-CM

## 2021-09-17 LAB
25(OH)D3 SERPL-MCNC: 62 NG/ML (ref 32–100)
A-G RATIO,AGRAT: 1.3 RATIO (ref 1.1–2.6)
ALBUMIN SERPL-MCNC: 3.9 G/DL (ref 3.5–5)
ALP SERPL-CCNC: 76 U/L (ref 25–115)
ALT SERPL-CCNC: 31 U/L (ref 5–40)
ANION GAP SERPL CALC-SCNC: 13 MMOL/L (ref 3–15)
AST SERPL W P-5'-P-CCNC: 20 U/L (ref 10–37)
BILIRUB SERPL-MCNC: 0.4 MG/DL (ref 0.2–1.2)
BUN SERPL-MCNC: 5 MG/DL (ref 6–22)
CALCIUM SERPL-MCNC: 9.3 MG/DL (ref 8.4–10.5)
CHLORIDE SERPL-SCNC: 103 MMOL/L (ref 98–110)
CO2 SERPL-SCNC: 25 MMOL/L (ref 20–32)
CREAT SERPL-MCNC: 0.7 MG/DL (ref 0.5–1.2)
ERYTHROCYTE [DISTWIDTH] IN BLOOD BY AUTOMATED COUNT: 16.8 % (ref 10–15.5)
FERRITIN SERPL-MCNC: 80 NG/ML (ref 10–291)
GFRAA, 66117: >60
GFRNA, 66118: >60
GLOBULIN,GLOB: 3 G/DL (ref 2–4)
GLUCOSE SERPL-MCNC: 79 MG/DL (ref 70–99)
HCT VFR BLD AUTO: 41.1 % (ref 35.1–46.5)
HGB BLD-MCNC: 13 G/DL (ref 11.7–15.5)
IMMATURE PLATELET FRACTION: 9.6 % (ref 1.1–7.1)
IRON,IRN: 53 MCG/DL (ref 30–160)
MCH RBC QN AUTO: 29 PG (ref 26–34)
MCHC RBC AUTO-ENTMCNC: 32 G/DL (ref 31–36)
MCV RBC AUTO: 91 FL (ref 81–99)
PLATELET # BLD AUTO: 215 K/UL (ref 140–440)
PMV BLD AUTO: 13.6 FL (ref 9–13)
POTASSIUM SERPL-SCNC: 3.9 MMOL/L (ref 3.5–5.5)
PROT SERPL-MCNC: 6.9 G/DL (ref 6.4–8.3)
RBC # BLD AUTO: 4.53 M/UL (ref 3.8–5.2)
SENTARA SPECIMEN COL,SENBCF: NORMAL
SODIUM SERPL-SCNC: 141 MMOL/L (ref 133–145)
VIT B12 SERPL-MCNC: >2000 PG/ML (ref 211–911)
WBC # BLD AUTO: 5.9 K/UL (ref 4–11)

## 2021-09-17 PROCEDURE — 74011000250 HC RX REV CODE- 250: Performed by: NURSE PRACTITIONER

## 2021-09-17 PROCEDURE — 99001 SPECIMEN HANDLING PT-LAB: CPT

## 2021-09-17 PROCEDURE — 74240 X-RAY XM UPR GI TRC 1CNTRST: CPT

## 2021-09-17 RX ADMIN — BARIUM SULFATE 60 G: 960 POWDER, FOR SUSPENSION ORAL at 09:00

## 2021-09-23 LAB — VITAMIN B1, WHOLE BLOOD, 66250: 120 NMOL/L (ref 66.5–200)

## 2021-09-28 ENCOUNTER — OFFICE VISIT (OUTPATIENT)
Dept: SURGERY | Age: 36
End: 2021-09-28
Payer: COMMERCIAL

## 2021-09-28 VITALS
SYSTOLIC BLOOD PRESSURE: 132 MMHG | BODY MASS INDEX: 32.28 KG/M2 | HEART RATE: 56 BPM | WEIGHT: 213 LBS | TEMPERATURE: 98.1 F | DIASTOLIC BLOOD PRESSURE: 87 MMHG | HEIGHT: 68 IN | OXYGEN SATURATION: 98 %

## 2021-09-28 DIAGNOSIS — K91.2 POST-RESECTION MALABSORPTION: Primary | ICD-10-CM

## 2021-09-28 DIAGNOSIS — K59.00 CONSTIPATION, UNSPECIFIED CONSTIPATION TYPE: ICD-10-CM

## 2021-09-28 DIAGNOSIS — Z98.84 S/P GASTRIC BYPASS: ICD-10-CM

## 2021-09-28 DIAGNOSIS — E66.9 OBESITY (BMI 30-39.9): ICD-10-CM

## 2021-09-28 PROCEDURE — 99213 OFFICE O/P EST LOW 20 MIN: CPT | Performed by: NURSE PRACTITIONER

## 2021-09-28 RX ORDER — FEXOFENADINE HCL AND PSEUDOEPHEDRINE HCI 60; 120 MG/1; MG/1
1 TABLET, EXTENDED RELEASE ORAL EVERY 12 HOURS
COMMUNITY
End: 2021-12-14

## 2021-09-28 RX ORDER — SUCRALFATE 1 G/10ML
SUSPENSION ORAL
COMMUNITY
Start: 2021-09-20 | End: 2021-12-14

## 2021-09-28 RX ORDER — ESOMEPRAZOLE MAGNESIUM 40 MG/1
CAPSULE, DELAYED RELEASE ORAL
COMMUNITY
Start: 2021-09-14 | End: 2022-06-14

## 2021-09-28 NOTE — PATIENT INSTRUCTIONS
Constipation    It typically can be prevented by drinking at least 64 ounces of water daily    If you're prone to constipation:  - Take a Stool Softener every day:  (such as Dulcolax Stool Softener)  - Eat Plenty of Fiber   Keep your fiber intake to at least 20 grams a day   Use SUGAR-FREE products: Fiber One products, Benefiber or Metamucil    If you get constipated:  1. Start with a Stool Softener such as Ducloax (bisacodyl)    Dulcolax Stool Softener 100 mg    2. Or try an osmotic laxative such as Miralax   1 capful daily, can go up to 3 capfuls daily    3. If you still have constipation after 2 or 3 days, add a Stimulant Laxative to the Stool Softener (this will produce a bowel movement in 6 - 12 hr):   Examples:    Exlax (1 small square) for up to 4 days    Dulcolax stimulant laxative    Magnesium citrate pill 500 mg start with once a day and go up to 3 times a day if needed    3. Or you can go straight to a combination Stool Softner / Stimulant at night. If you need, you can add a morning dose. Examples:    Pericolace 50 mg / 8.25 mg    Sennakot-S  50 mg / 8.25 mg    4.   If You're Really locked up, get Liquid Magnesium Citrate (take according to the  directions)

## 2021-09-28 NOTE — PROGRESS NOTES
Bariatric Postoperative Progress Note    CC: 3 Month LGBP Follow Up    Crystal Newell is a 43 y.o. female now 3 years status post laparoscopic gastric bypass surgery performed on 6/23/21. Doing well overall. Currently eating chicken, ground turkey, fish, spinach, greens, green beans. Taking in 72 oz water,  60-80 g protein. 40-45min of activity 3 days a week. Bowel movements are alternating constipation and regularity. The patient is not having any pain. She is compliant with multivitamins, calcium, Vit D and B12 supplements. Saw GI and was started on Nexium BID and Carafate. She reports symptoms have improved and she is feeling much better than last visit, follow up with GI in 3 months. Would like to discuss birth control options.      Weight Loss Metrics 9/28/2021 9/28/2021 9/13/2021 9/13/2021 7/8/2021 7/8/2021 6/30/2021   Pre op / Initial Wt 266 - 266 - 266 - 266   Today's Wt - 213 lb - 219 lb - 252 lb -   BMI - 32.39 kg/m2 - 33.3 kg/m2 - 38.32 kg/m2 -   Ideal Body Wt 143 - 143 - 143 - 143   Excess Body Wt 123 - 123 - 123 - 123   Goal Wt 168 - 168 - 168 - 168   Wt loss to date 53 - 47 - 14 - 5   % Wt Loss 0.54 - 0.48 - 0.14 - 0.05   80% EBW 98.4 - 98.4 - 98.4 - 98.4       Comorbidities:  Hypertension: not applicable  Diabetes: not applicable  Obstructive Sleep Apnea: resolved  Hyperlipidemia: not applicable  Stress Urinary Incontinence: resolved  Gastroesophageal Reflux: improved  Weight related arthropathy:improved        Past Medical History:   Diagnosis Date    Anxiety     Asthma     COVID-19 vaccine series completed 02/2021    Environmental allergies     GERD (gastroesophageal reflux disease)     HLD (hyperlipidemia)     HSV-2 infection     Nausea & vomiting     MILD NAUSEA ONLY    OCD (obsessive compulsive disorder)     PCOS (polycystic ovarian syndrome)     Prediabetes     Vitamin D deficiency        Past Surgical History:   Procedure Laterality Date    HX BREAST LUMPECTOMY Left 2013  HX CHOLECYSTECTOMY      HX GI      gastric bypass    HX KNEE ARTHROSCOPY Right 2010    HX OTHER SURGICAL  06/25/2019    excsion of pilonida cyst     HX WISDOM TEETH EXTRACTION  2003       Current Outpatient Medications   Medication Sig Dispense Refill    fexofenadine-pseudoephedrine (Allegra-D 12 Hour)  mg Tb12 Take 1 Tablet by mouth every twelve (12) hours.  sucralfate (CARAFATE) 100 mg/mL suspension TAKE 10 ML FOUR TIMES A DAY (WITH MEALS AND AT BEDTIME)      esomeprazole (NEXIUM) 40 mg capsule TAKE 1 CAP BY MOUTH TWICE DAILY BEFORE MEALS.  ondansetron (ZOFRAN ODT) 4 mg disintegrating tablet Take 1 Tablet by mouth every eight (8) hours as needed for Nausea or Vomiting. 15 Tablet 0    biotin 1,000 mcg chew Take  by mouth.  multivitamin with iron (FLINTSTONES) chewable tablet Take 1 Tablet by mouth daily. 2x day      cyanocobalamin (Vitamin B-12) 1,000 mcg tablet Take 1,000 mcg by mouth daily.  thiamine HCL (B-1) 100 mg tablet Take  by mouth daily.  OTHER Calcium citrate 600 milligram 3x day      montelukast (SINGULAIR) 10 mg tablet Take 10 mg by mouth nightly.  cholecalciferol (VITAMIN D3) (1000 Units /25 mcg) tablet Take 5,000 Units by mouth daily.  EPIPEN 2-BOB 0.3 mg/0.3 mL injection INJECT 1 PEN INTO LATERAL THIGH FOR SYMPTOMS OF ANAPHYLAXIS.  0    albuterol sulfate (PROAIR RESPICLICK) 90 mcg/actuation aepb Take 2 Puffs by inhalation every four (4) hours as needed. 1 Inhaler 12    ethinyl estradiol-etonogestrel (NUVARING) 0.12-0.015 mg/24 hr vaginal ring by Intravaginal route. (Patient not taking: Reported on 9/28/2021)      tiotropium bromide (Spiriva Respimat) 2.5 mcg/actuation inhaler Take 2 Puffs by inhalation daily. (Patient not taking: Reported on 9/13/2021)      cetirizine (ZYRTEC) 10 mg tablet Take  by mouth.  (Patient not taking: Reported on 9/28/2021)         Allergies   Allergen Reactions    Latex Atopic Dermatitis    Nubain [Nalbuphine] Hives    Sulfa (Sulfonamide Antibiotics) Hives    Iodine Hives    Shellfish Derived Hives       ROS:  Review of Systems   Constitutional: Positive for weight loss. Negative for malaise/fatigue. Eyes: Negative. Respiratory: Negative. Cardiovascular: Negative for chest pain, palpitations and leg swelling. Gastrointestinal: Positive for constipation. Negative for abdominal pain, diarrhea, heartburn, nausea and vomiting. Genitourinary: Negative. Musculoskeletal: Negative. Skin: Negative. Neurological: Negative for dizziness, tingling, weakness and headaches. Physicial Exam:  Visit Vitals  /87 (BP 1 Location: Left upper arm, BP Patient Position: Sitting)   Pulse (!) 56   Temp 98.1 °F (36.7 °C)   Ht 5' 8\" (1.727 m)   Wt 96.6 kg (213 lb)   LMP 09/28/2021 (Exact Date)   SpO2 98%   BMI 32.39 kg/m²     Physical Exam  Vitals and nursing note reviewed. Constitutional:       Appearance: She is obese. HENT:      Head: Normocephalic and atraumatic. Cardiovascular:      Rate and Rhythm: Normal rate. Pulses: Normal pulses. Heart sounds: Normal heart sounds. Pulmonary:      Effort: Pulmonary effort is normal.      Breath sounds: Normal breath sounds. Abdominal:      General: Bowel sounds are normal. There is no distension. Palpations: Abdomen is soft. There is no mass. Tenderness: There is no abdominal tenderness. Hernia: No hernia is present. Musculoskeletal:         General: Normal range of motion. Skin:     General: Skin is warm and dry. Neurological:      General: No focal deficit present. Mental Status: She is alert and oriented to person, place, and time. Psychiatric:         Mood and Affect: Mood normal.         Behavior: Behavior normal.         Labs:   Recent Results (from the past 672 hour(s))   [address] [place] [address].     Collection Time: 09/17/21  8:53 AM   Result Value Ref Range    [facility] SPECIM[facility] COL Specimens collected/sent to [facility]    Collection Time: 09/17/21  9:00 AM   Result Value Ref Range    Ferritin 80 10 - 291 ng/mL   VITAMIN D, 25 HYDROXY    Collection Time: 09/17/21  9:00 AM   Result Value Ref Range    VITAMIN D, 25-HYDROXY 62.0 32.0 - 021.0 ng/mL   METABOLIC PANEL, COMPREHENSIVE    Collection Time: 09/17/21  9:00 AM   Result Value Ref Range    Glucose 79 70 - 99 mg/dL    BUN 5 (L) 6 - 22 mg/dL    Creatinine 0.7 0.5 - 1.2 mg/dL    Sodium 141 133 - 145 mmol/L    Potassium 3.9 3.5 - 5.5 mmol/L    Chloride 103 98 - 110 mmol/L    CO2 25 20 - 32 mmol/L    AST (SGOT) 20 10 - 37 U/L    ALT (SGPT) 31 5 - 40 U/L    Alk. phosphatase 76 25 - 115 U/L    Bilirubin, total 0.4 0.2 - 1.2 mg/dL    Calcium 9.3 8.4 - 10.5 mg/dL    Protein, total 6.9 6.4 - 8.3 g/dL    Albumin 3.9 3.5 - 5.0 g/dL    A-G Ratio 1.3 1.1 - 2.6 ratio    Globulin 3.0 2.0 - 4.0 g/dL    Anion gap 13.0 3.0 - 15.0 mmol/L    GFRAA >60.0 >60.0    GFRNA >60.0 >60.0   IRON    Collection Time: 09/17/21  9:00 AM   Result Value Ref Range    Iron 53 30 - 160 mcg/dL   VITAMIN B12    Collection Time: 09/17/21  9:00 AM   Result Value Ref Range    Vitamin B12 >2000 (H) 211 - 911 pg/mL   CBC W/O DIFF    Collection Time: 09/17/21  9:00 AM   Result Value Ref Range    WBC 5.9 4.0 - 11.0 K/uL    RBC 4.53 3.80 - 5.20 M/uL    HGB 13.0 11.7 - 15.5 g/dL    HCT 41.1 35.1 - 46.5 %    MCV 91 81 - 99 fL    MCH 29 26 - 34 pg    MCHC 32 31 - 36 g/dL    RDW 16.8 (H) 10.0 - 15.5 %    PLATELET 851 855 - 280 K/uL    MPV 13.6 (H) 9.0 - 13.0 fL    IMMATURE PLATELET FRACTION 9.6 (H) 1.1 - 7.1 %   VITAMIN B1, WHOLE BLOOD    Collection Time: 09/17/21  9:00 AM   Result Value Ref Range    VITAMIN B1, WHOLE BLOOD 120.0 66.5 - 200.0 nmol/L       Assessment/Plan:   She is currently 3 months s/p laparoscopic gastric bypass surgery with a total weight loss of 53 lbs to date, doing well. Labs were reviewed and pt was instructed to continue MVI/B1/B12/D supplementation.  Can decrease B12 to half tab daily or every other day. Discussed different birth control options d/t hx bariatric surgery. OCP not recommended due to hx malabsorptive procedure, pt had side effects with ring, and does not want to use IUD or patch. She is concerned about depo and bone density. Discussed to keep depo use under 2 years if she desires to use that method and continue with calcium supplementation and weight bearing exercise. Can order DEXA in 1-2 years if she continues with that method. Constipation protocol reviewed  We have reviewed the components of a successful postoperative course including requirement for a high protein, low carbohydrate diet, 64 oz a day of zero calorie liquids, daily vitamin supplementation, daily exercise (150 mis/week), regular follow-up, and participation in support groups. Refer to registered dietitian for more detailed medical nutrition therapy as needed. The primary encounter diagnosis was Post-resection malabsorption. Diagnoses of S/P gastric bypass, Obesity (BMI 30-39.9), BMI 32.0-32.9,adult, and Constipation, unspecified constipation type were also pertinent to this visit. Follow-up and Dispositions    · Return in about 3 months (around 12/28/2021), or if symptoms worsen or fail to improve. with labs, sooner as needed.   Jonelle Quezada NP

## 2021-12-07 ENCOUNTER — HOSPITAL ENCOUNTER (OUTPATIENT)
Dept: LAB | Age: 36
Discharge: HOME | End: 2021-12-07

## 2021-12-07 DIAGNOSIS — Z98.84 S/P GASTRIC BYPASS: ICD-10-CM

## 2021-12-07 DIAGNOSIS — E66.9 OBESITY (BMI 30-39.9): ICD-10-CM

## 2021-12-07 DIAGNOSIS — E86.0 DEHYDRATION: ICD-10-CM

## 2021-12-07 DIAGNOSIS — K91.2 POST-RESECTION MALABSORPTION: ICD-10-CM

## 2021-12-07 DIAGNOSIS — R11.0 NAUSEA: ICD-10-CM

## 2021-12-07 DIAGNOSIS — R10.13 EPIGASTRIC PAIN: ICD-10-CM

## 2021-12-07 LAB — SENTARA SPECIMEN COL,SENBCF: NORMAL

## 2021-12-07 PROCEDURE — 99001 SPECIMEN HANDLING PT-LAB: CPT

## 2021-12-07 RX ORDER — ONDANSETRON 4 MG/1
TABLET, ORALLY DISINTEGRATING ORAL
Qty: 15 TABLET | Refills: 0 | Status: SHIPPED | OUTPATIENT
Start: 2021-12-07 | End: 2022-06-07 | Stop reason: ALTCHOICE

## 2021-12-08 LAB
25(OH)D3 SERPL-MCNC: 71.8 NG/ML (ref 32–100)
A-G RATIO,AGRAT: 1.7 RATIO (ref 1.1–2.6)
ALBUMIN SERPL-MCNC: 4.3 G/DL (ref 3.5–5)
ALP SERPL-CCNC: 83 U/L (ref 25–115)
ALT SERPL-CCNC: 15 U/L (ref 5–40)
ANION GAP SERPL CALC-SCNC: 14 MMOL/L (ref 3–15)
AST SERPL W P-5'-P-CCNC: 12 U/L (ref 10–37)
BILIRUB SERPL-MCNC: 0.4 MG/DL (ref 0.2–1.2)
BUN SERPL-MCNC: 12 MG/DL (ref 6–22)
CALCIUM SERPL-MCNC: 9.7 MG/DL (ref 8.4–10.5)
CHLORIDE SERPL-SCNC: 103 MMOL/L (ref 98–110)
CO2 SERPL-SCNC: 23 MMOL/L (ref 20–32)
CREAT SERPL-MCNC: 0.7 MG/DL (ref 0.5–1.2)
ERYTHROCYTE [DISTWIDTH] IN BLOOD BY AUTOMATED COUNT: 14.8 % (ref 10–15.5)
FERRITIN SERPL-MCNC: 75 NG/ML (ref 10–291)
FOLATE,FOL: 11.5 NG/ML
GFRAA, 66117: >60
GFRNA, 66118: >60
GLOBULIN,GLOB: 2.6 G/DL (ref 2–4)
GLUCOSE SERPL-MCNC: 82 MG/DL (ref 70–99)
HCT VFR BLD AUTO: 42.5 % (ref 35.1–46.5)
HGB BLD-MCNC: 13.4 G/DL (ref 11.7–15.5)
IRON,IRN: 62 MCG/DL (ref 30–160)
MCH RBC QN AUTO: 31 PG (ref 26–34)
MCHC RBC AUTO-ENTMCNC: 32 G/DL (ref 31–36)
MCV RBC AUTO: 97 FL (ref 80–99)
PLATELET # BLD AUTO: 275 K/UL (ref 140–440)
PMV BLD AUTO: 12.5 FL (ref 9–13)
POTASSIUM SERPL-SCNC: 4.2 MMOL/L (ref 3.5–5.5)
PROT SERPL-MCNC: 6.9 G/DL (ref 6.4–8.3)
RBC # BLD AUTO: 4.39 M/UL (ref 3.8–5.2)
SODIUM SERPL-SCNC: 140 MMOL/L (ref 133–145)
VIT B12 SERPL-MCNC: >2000 PG/ML (ref 211–911)
WBC # BLD AUTO: 7 K/UL (ref 4–11)

## 2021-12-12 LAB — VITAMIN B1, WHOLE BLOOD, 66250: 164.6 NMOL/L (ref 66.5–200)

## 2021-12-14 ENCOUNTER — OFFICE VISIT (OUTPATIENT)
Dept: SURGERY | Age: 36
End: 2021-12-14
Payer: COMMERCIAL

## 2021-12-14 VITALS
TEMPERATURE: 97.6 F | SYSTOLIC BLOOD PRESSURE: 125 MMHG | WEIGHT: 183 LBS | OXYGEN SATURATION: 96 % | HEART RATE: 94 BPM | DIASTOLIC BLOOD PRESSURE: 79 MMHG | BODY MASS INDEX: 27.74 KG/M2 | HEIGHT: 68 IN

## 2021-12-14 DIAGNOSIS — E66.3 OVERWEIGHT: ICD-10-CM

## 2021-12-14 DIAGNOSIS — K91.2 POST-RESECTION MALABSORPTION: Primary | ICD-10-CM

## 2021-12-14 DIAGNOSIS — Z86.39 HX OF OBESITY: ICD-10-CM

## 2021-12-14 DIAGNOSIS — Z98.84 S/P GASTRIC BYPASS: ICD-10-CM

## 2021-12-14 PROCEDURE — 99213 OFFICE O/P EST LOW 20 MIN: CPT | Performed by: NURSE PRACTITIONER

## 2021-12-14 RX ORDER — DOCUSATE SODIUM 100 MG/1
100 CAPSULE, LIQUID FILLED ORAL 2 TIMES DAILY
COMMUNITY
End: 2022-06-14

## 2021-12-14 RX ORDER — POLYETHYLENE GLYCOL 3350 17 G/17G
17 POWDER, FOR SOLUTION ORAL DAILY
COMMUNITY

## 2021-12-14 RX ORDER — MEDROXYPROGESTERONE ACETATE 104 MG/.65ML
104 INJECTION, SUSPENSION SUBCUTANEOUS
COMMUNITY

## 2021-12-14 NOTE — PROGRESS NOTES
Bariatric Postoperative Progress Note    CC: 6 Month LGBP Follow Up    Meli Garcia is a 43 y.o. female now 6 months status post laparoscopic gastric bypass surgery performed on 6/23/21. Doing well overall. Currently eating yogurt, chicken, turkey, fish, beef, broccoli, cauliflowers, green, squash. Taking in 64 oz water,  60+g protein. 35-45 min of recumbent bike 4 days a week. Bowel movements alternate between regularity and constipation, using Colace and Miralax. The patient is not having any pain. She is compliant with multivitamins, calcium, Vit D and B12 supplements. Goal weight 165, would like to transition to vegan diet at some point, reporting increase in sensitivity in dairy. Denies any NSAID, ETOH, or tobacco use.        Weight Loss Metrics 12/14/2021 12/14/2021 9/28/2021 9/28/2021 9/13/2021 9/13/2021 7/8/2021   Pre op / Initial Wt 266 - 266 - 266 - 266   Today's Wt - 183 lb - 213 lb - 219 lb -   BMI - 27.83 kg/m2 - 32.39 kg/m2 - 33.3 kg/m2 -   Ideal Body Wt 143 - 143 - 143 - 143   Excess Body Wt 123 - 123 - 123 - 123   Goal Wt 168 - 168 - 168 - 168   Wt loss to date 83 - 53 - 47 - 14   % Wt Loss 0.84 - 0.54 - 0.48 - 0.14   80% EBW 98.4 - 98.4 - 98.4 - 98.4       Comorbidities:  Hypertension: not applicable  Diabetes: not applicable  Obstructive Sleep Apnea: resolved  Hyperlipidemia: not applicable  Stress Urinary Incontinence: resolved  Gastroesophageal Reflux: improved  Weight related arthropathy:improved        Past Medical History:   Diagnosis Date    Anxiety     Asthma     COVID-19 vaccine series completed 02/2021    Environmental allergies     GERD (gastroesophageal reflux disease)     HLD (hyperlipidemia)     HSV-2 infection     Nausea & vomiting     MILD NAUSEA ONLY    OCD (obsessive compulsive disorder)     PCOS (polycystic ovarian syndrome)     Prediabetes     Vitamin D deficiency        Past Surgical History:   Procedure Laterality Date    HX BREAST LUMPECTOMY Left 2013    HX CHOLECYSTECTOMY      HX GI      gastric bypass    HX KNEE ARTHROSCOPY Right 2010    HX OTHER SURGICAL  06/25/2019    excsion of pilonida cyst     HX WISDOM TEETH EXTRACTION  2003       Current Outpatient Medications   Medication Sig Dispense Refill    docusate sodium (Colace) 100 mg capsule Take 100 mg by mouth two (2) times a day.  polyethylene glycol (Miralax) 17 gram/dose powder Take 17 g by mouth daily.  medroxyPROGESTERone (depo-SubQ provera 104) 104 mg/0.65 mL injection 104 mg by SubCUTAneous route.  ondansetron (ZOFRAN ODT) 4 mg disintegrating tablet DISSOLVE 1 TABLET ON THE TONGUE EVERY 8 HOURS AS NEEDED FOR NAUSEA OR VOMITING 15 Tablet 0    esomeprazole (NEXIUM) 40 mg capsule TAKE 1 CAP BY MOUTH TWICE DAILY BEFORE MEALS.  biotin 1,000 mcg chew Take  by mouth.  multivitamin with iron (FLINTSTONES) chewable tablet Take 1 Tablet by mouth daily. 2x day      cyanocobalamin (Vitamin B-12) 1,000 mcg tablet Take 1,000 mcg by mouth daily.  thiamine HCL (B-1) 100 mg tablet Take  by mouth daily.  OTHER Calcium citrate 600 milligram 3x day      montelukast (SINGULAIR) 10 mg tablet Take 10 mg by mouth nightly.  cholecalciferol (VITAMIN D3) (1000 Units /25 mcg) tablet Take 5,000 Units by mouth daily.  tiotropium bromide (Spiriva Respimat) 2.5 mcg/actuation inhaler Take 2 Puffs by inhalation daily.  EPIPEN 2-BOB 0.3 mg/0.3 mL injection INJECT 1 PEN INTO LATERAL THIGH FOR SYMPTOMS OF ANAPHYLAXIS.  0    albuterol sulfate (PROAIR RESPICLICK) 90 mcg/actuation aepb Take 2 Puffs by inhalation every four (4) hours as needed. 1 Inhaler 12    cetirizine (ZYRTEC) 10 mg tablet Take  by mouth.          Allergies   Allergen Reactions    Latex Atopic Dermatitis    Nubain [Nalbuphine] Hives    Sulfa (Sulfonamide Antibiotics) Hives    Iodine Hives    Shellfish Derived Hives       ROS:  Review of Systems   Constitutional: Positive for malaise/fatigue and weight loss.   Eyes: Negative. Respiratory: Positive for shortness of breath (Asthma). Negative for cough. Cardiovascular: Negative for chest pain and palpitations. Gastrointestinal: Positive for constipation and nausea. Negative for abdominal pain, blood in stool, diarrhea, heartburn, melena and vomiting. Genitourinary: Negative. Skin: Negative. Neurological: Negative for dizziness, tingling, loss of consciousness, weakness and headaches. Physicial Exam:  Visit Vitals  /79 (BP 1 Location: Right arm, BP Patient Position: Sitting)   Pulse 94   Temp 97.6 °F (36.4 °C)   Ht 5' 8\" (1.727 m)   Wt 83 kg (183 lb)   SpO2 96%   BMI 27.83 kg/m²     Physical Exam  Vitals and nursing note reviewed. Constitutional:       Appearance: Normal appearance. HENT:      Head: Normocephalic and atraumatic. Cardiovascular:      Rate and Rhythm: Normal rate. Pulses: Normal pulses. Heart sounds: Normal heart sounds. Pulmonary:      Effort: Pulmonary effort is normal.      Breath sounds: Normal breath sounds. Abdominal:      General: Bowel sounds are normal. There is no distension. Palpations: Abdomen is soft. There is no mass. Tenderness: There is no abdominal tenderness. Hernia: No hernia is present. Musculoskeletal:         General: Normal range of motion. Skin:     General: Skin is warm and dry. Neurological:      General: No focal deficit present. Mental Status: She is alert and oriented to person, place, and time. Psychiatric:         Mood and Affect: Mood normal.         Behavior: Behavior normal.       Labs:   Recent Results (from the past 672 hour(s))   VITAMIN B1, WHOLE BLOOD    Collection Time: 12/07/21 10:59 AM   Result Value Ref Range    VITAMIN B1, WHOLE BLOOD 164.6 66.5 - 200.0 nmol/L   [facility] SPECIMEN [facility].     Collection Time: 12/07/21 11:03 AM   Result Value Ref Range    [facility] SPECIMEN COL Specimens collected/sent to [facility]     METABOLIC PANEL, COMPREHENSIVE    Collection Time: 12/07/21 11:08 AM   Result Value Ref Range    Glucose 82 70 - 99 mg/dL    BUN 12 6 - 22 mg/dL    Creatinine 0.7 0.5 - 1.2 mg/dL    Sodium 140 133 - 145 mmol/L    Potassium 4.2 3.5 - 5.5 mmol/L    Chloride 103 98 - 110 mmol/L    CO2 23 20 - 32 mmol/L    AST (SGOT) 12 10 - 37 U/L    ALT (SGPT) 15 5 - 40 U/L    Alk. phosphatase 83 25 - 115 U/L    Bilirubin, total 0.4 0.2 - 1.2 mg/dL    Calcium 9.7 8.4 - 10.5 mg/dL    Protein, total 6.9 6.4 - 8.3 g/dL    Albumin 4.3 3.5 - 5.0 g/dL    A-G Ratio 1.7 1.1 - 2.6 ratio    Globulin 2.6 2.0 - 4.0 g/dL    Anion gap 14.0 3.0 - 15.0 mmol/L    GFRAA >60.0 >60.0    GFRNA >60.0 >60.0   FERRITIN    Collection Time: 12/07/21 11:08 AM   Result Value Ref Range    Ferritin 75 10 - 291 ng/mL   IRON    Collection Time: 12/07/21 11:08 AM   Result Value Ref Range    Iron 62 30 - 160 mcg/dL   VITAMIN B12 & FOLATE    Collection Time: 12/07/21 11:08 AM   Result Value Ref Range    Vitamin B12 >2000 (H) 211 - 911 pg/mL    Folate 11.50 >=3.10 ng/mL   VITAMIN D, 25 HYDROXY    Collection Time: 12/07/21 11:08 AM   Result Value Ref Range    VITAMIN D, 25-HYDROXY 71.8 32.0 - 100.0 ng/mL   CBC W/O DIFF    Collection Time: 12/07/21 11:08 AM   Result Value Ref Range    WBC 7.0 4.0 - 11.0 K/uL    RBC 4.39 3.80 - 5.20 M/uL    HGB 13.4 11.7 - 15.5 g/dL    HCT 42.5 35.1 - 46.5 %    MCV 97 80 - 99 fL    MCH 31 26 - 34 pg    MCHC 32 31 - 36 g/dL    RDW 14.8 10.0 - 15.5 %    PLATELET 135 217 - 768 K/uL    MPV 12.5 9.0 - 13.0 fL       Assessment/Plan:   She is currently 6 months s/p laparoscopic gastric bypass surgery with a total weight loss of 83 lbs to date, doing well. Labs were reviewed and pt was instructed to continue MVI/B1/B12/D supplementation. Currently taking BA Ultra Solo, so unable to decrease B12. Pt concerned that iron is trending down. Discussed that still wnl but can incorporate a 65 mg dose 1-2 times weekly to prevent further constipation.  She is on Depo Provera shot and is no longer having periods so discussed that iron may increase due to that. She will bring up transitioning to vegan with RD at 9 month follow up. We have reviewed the components of a successful postoperative course including requirement for a high protein, low carbohydrate diet, 64 oz a day of zero calorie liquids, daily vitamin supplementation, daily exercise (150 mis/week), regular follow-up, and participation in support groups. Refer to registered dietitian for more detailed medical nutrition therapy as needed. The primary encounter diagnosis was Post-resection malabsorption. Diagnoses of S/P gastric bypass, Hx of obesity, Overweight, and BMI 27.0-27.9,adult were also pertinent to this visit. Follow-up and Dispositions    · Return in about 6 months (around 6/14/2022). with labs, sooner as needed.   Miguel Ángel Dooley NP

## 2022-03-18 PROBLEM — K21.9 GASTROESOPHAGEAL REFLUX DISEASE WITHOUT ESOPHAGITIS: Status: ACTIVE | Noted: 2018-05-22

## 2022-03-18 PROBLEM — Z01.818 PREOPERATIVE CLEARANCE: Status: ACTIVE | Noted: 2021-04-24

## 2022-03-18 PROBLEM — F32.A DEPRESSION: Status: ACTIVE | Noted: 2018-05-22

## 2022-03-18 PROBLEM — R73.03 PREDIABETES: Status: ACTIVE | Noted: 2018-05-22

## 2022-03-19 PROBLEM — E79.0 ELEVATED URIC ACID IN BLOOD: Status: ACTIVE | Noted: 2018-05-22

## 2022-03-19 PROBLEM — E66.01 MORBID OBESITY (HCC): Status: ACTIVE | Noted: 2021-06-23

## 2022-03-19 PROBLEM — E28.2 PCOS (POLYCYSTIC OVARIAN SYNDROME): Status: ACTIVE | Noted: 2018-04-19

## 2022-03-19 PROBLEM — E78.41 ELEVATED LP(A): Status: ACTIVE | Noted: 2018-05-22

## 2022-03-19 PROBLEM — E78.5 HYPERLIPIDEMIA: Status: ACTIVE | Noted: 2018-05-22

## 2022-04-15 ENCOUNTER — HOSPITAL ENCOUNTER (OUTPATIENT)
Dept: BARIATRICS/WEIGHT MGMT | Age: 37
Discharge: HOME | End: 2022-04-15

## 2022-04-29 ENCOUNTER — DOCUMENTATION ONLY (OUTPATIENT)
Dept: BARIATRICS/WEIGHT MGMT | Age: 37
End: 2022-04-29

## 2022-04-29 NOTE — PROGRESS NOTES
[address] [facility], [address]      Patient's Name: Quentin Boyce   Age: 43 y.o. YOB: 1985   Sex: female    Date:   4/15/2022    Height: 68 inches Weight:    164      Lbs. BMI 24.9     Surgery Date: 6/23/21    Starting Weight: not reported   Last Recorded Weight: not report     Procedure: LGBP    Lowest Weight patient has achieved since surgery: 164    How long has patient been at this weight for: not reported    Diet History (reported by patient on diet history form)    Do you smoke: no    Alcohol Intake: none     Meals per day: 3    Portion sizes ~: deck of cards    Experiencing dumping syndrome: yes after eating protein bar with sugar alcohols      Fluids being consumed: water and  drinking protein drinks    Exercise History    Patient is doing foot eplicital under desk  for exercise. Vitamins    Patient is taking Bariatric Advantage Multivitamins per day    Patient is taking Calcium in the form of 3 x day 500 mg each    Summary:  I have reinforced the key diet principles to the patient. Patient was instructed to follow a 3 meal a day routine. I have reinforced the importance of filling up on meat and vegetables and avoiding carbohydrates. I have talked with patient about reactive hypoglycemia and although carbohydrates are not good from a weight-management point of view, they are actually very dangerous and should be avoided. If patient is getting hungry between meals focus on meat and vegetables and a list of food choices was reviewed with patient. Reinforced to patient the importance of being properly hydrated and the need to get 64 ounces of fluid in per day. Reinforced to patient the need to do 30 minutes of exercise per day. We also spent some time talking about the vitamins and the importance of taking them. Reinforced the dosage of vitamins to patient.   Patient was reminded that the vitamins are      Jos Piedra RDN  4/29/2022

## 2022-05-03 ENCOUNTER — PATIENT MESSAGE (OUTPATIENT)
Dept: SURGERY | Age: 37
End: 2022-05-03

## 2022-05-03 ENCOUNTER — TELEPHONE (OUTPATIENT)
Dept: SURGERY | Age: 37
End: 2022-05-03

## 2022-05-06 DIAGNOSIS — E66.9 OBESITY (BMI 30-39.9): ICD-10-CM

## 2022-05-06 DIAGNOSIS — K91.2 POST-RESECTION MALABSORPTION: Primary | ICD-10-CM

## 2022-05-06 DIAGNOSIS — K91.2 POST-RESECTION MALABSORPTION: ICD-10-CM

## 2022-05-31 NOTE — TELEPHONE ENCOUNTER
From: Delano Oppenheim  To: Donovan DO Marii  Sent: 5/3/2022  2:40 PM EDT  Subject:  MBS results    I am attaching my MBS results from today. I have been having a pretty bad asthma exacerbation and cough. ...pulmonary sent me for an MBS. I have been taking 40mg nexium BID as prescribed by GI MD.    MBS results: Severe spontaneous reflux to the thoracic inlet present. A small to moderate hiatal hernia is present. Should I be having reflux this bad after having gastric bypass. ...even with taking 40mg nexium BID? Could this be related to the surgery? Star      Called pt and spoke with her regarding message above. She was prescribed multiple rounds of PO steroids earlier this year due to asthma exacerbations and noticed reflux worsened afterwards. She has been taking Nexium capsule bid and was switched to Protonix tab after EGD last week. Discussed if possible to avoid PO steroids and continue Protonix tab, will follow up in office on visit scheduled 6/14.

## 2022-06-07 DIAGNOSIS — R11.0 NAUSEA: ICD-10-CM

## 2022-06-07 DIAGNOSIS — R10.13 EPIGASTRIC PAIN: ICD-10-CM

## 2022-06-07 DIAGNOSIS — K91.2 POST-RESECTION MALABSORPTION: Primary | ICD-10-CM

## 2022-06-07 DIAGNOSIS — Z98.84 S/P GASTRIC BYPASS: ICD-10-CM

## 2022-06-07 RX ORDER — ONDANSETRON 4 MG/1
4 TABLET, ORALLY DISINTEGRATING ORAL
Qty: 30 TABLET | Refills: 1 | Status: SHIPPED | OUTPATIENT
Start: 2022-06-07

## 2022-06-09 LAB
25(OH)D3 SERPL-MCNC: 148 NG/ML (ref 32–100)
A-G RATIO,AGRAT: 2 RATIO (ref 1.1–2.6)
ABSOLUTE LYMPHOCYTE COUNT, 10803: 1.8 K/UL (ref 1–4.8)
ALBUMIN SERPL-MCNC: 4.7 G/DL (ref 3.5–5)
ALP SERPL-CCNC: 76 U/L (ref 25–115)
ALT SERPL-CCNC: 48 U/L (ref 5–40)
ANION GAP SERPL CALC-SCNC: 11 MMOL/L (ref 3–15)
AST SERPL W P-5'-P-CCNC: 32 U/L (ref 10–37)
BASOPHILS # BLD: 0 K/UL (ref 0–0.2)
BASOPHILS NFR BLD: 1 % (ref 0–2)
BILIRUB SERPL-MCNC: 0.3 MG/DL (ref 0.2–1.2)
BUN SERPL-MCNC: 31 MG/DL (ref 6–22)
CALCIUM SERPL-MCNC: 9.5 MG/DL (ref 8.4–10.5)
CHLORIDE SERPL-SCNC: 102 MMOL/L (ref 98–110)
CO2 SERPL-SCNC: 26 MMOL/L (ref 20–32)
CREAT SERPL-MCNC: 0.8 MG/DL (ref 0.5–1.2)
EOSINOPHIL # BLD: 0 K/UL (ref 0–0.5)
EOSINOPHIL NFR BLD: 0 % (ref 0–6)
ERYTHROCYTE [DISTWIDTH] IN BLOOD BY AUTOMATED COUNT: 13.3 % (ref 10–15.5)
FERRITIN SERPL-MCNC: 37 NG/ML (ref 10–291)
FOLATE,FOL: >20 NG/ML
GLOBULIN,GLOB: 2.3 G/DL (ref 2–4)
GLOMERULAR FILTRATION RATE: >60 ML/MIN/1.73 SQ.M.
GLUCOSE SERPL-MCNC: 84 MG/DL (ref 70–99)
GRANULOCYTES,GRANS: 65 % (ref 40–75)
HCT VFR BLD AUTO: 40.4 % (ref 35.1–46.5)
HGB BLD-MCNC: 13.1 G/DL (ref 11.7–15.5)
IRON,IRN: 97 MCG/DL (ref 30–160)
LYMPHOCYTES, LYMLT: 23 % (ref 20–45)
MAGNESIUM SERPL-MCNC: 2.1 MG/DL (ref 1.6–2.5)
MCH RBC QN AUTO: 31 PG (ref 26–34)
MCHC RBC AUTO-ENTMCNC: 32 G/DL (ref 31–36)
MCV RBC AUTO: 96 FL (ref 80–99)
MONOCYTES # BLD: 0.8 K/UL (ref 0.1–1)
MONOCYTES NFR BLD: 10 % (ref 3–12)
NEUTROPHILS # BLD AUTO: 4.9 K/UL (ref 1.8–7.7)
PLATELET # BLD AUTO: 260 K/UL (ref 140–440)
PMV BLD AUTO: 11.6 FL (ref 9–13)
POTASSIUM SERPL-SCNC: 5.1 MMOL/L (ref 3.5–5.5)
PROT SERPL-MCNC: 7 G/DL (ref 6.4–8.3)
RBC # BLD AUTO: 4.22 M/UL (ref 3.8–5.2)
SODIUM SERPL-SCNC: 139 MMOL/L (ref 133–145)
VIT B12 SERPL-MCNC: >2000 PG/ML (ref 211–911)
WBC # BLD AUTO: 7.5 K/UL (ref 4–11)

## 2022-06-14 ENCOUNTER — OFFICE VISIT (OUTPATIENT)
Dept: SURGERY | Age: 37
End: 2022-06-14
Payer: COMMERCIAL

## 2022-06-14 VITALS
HEART RATE: 87 BPM | DIASTOLIC BLOOD PRESSURE: 78 MMHG | HEIGHT: 68 IN | OXYGEN SATURATION: 97 % | BODY MASS INDEX: 25.7 KG/M2 | SYSTOLIC BLOOD PRESSURE: 113 MMHG | WEIGHT: 169.6 LBS | TEMPERATURE: 97.9 F

## 2022-06-14 DIAGNOSIS — Z98.84 S/P GASTRIC BYPASS: ICD-10-CM

## 2022-06-14 DIAGNOSIS — R11.0 NAUSEA: ICD-10-CM

## 2022-06-14 DIAGNOSIS — K91.2 POST-RESECTION MALABSORPTION: Primary | ICD-10-CM

## 2022-06-14 DIAGNOSIS — Z86.39 HX OF OBESITY: ICD-10-CM

## 2022-06-14 DIAGNOSIS — K21.9 GASTROESOPHAGEAL REFLUX DISEASE WITHOUT ESOPHAGITIS: ICD-10-CM

## 2022-06-14 DIAGNOSIS — E67.3 VITAMIN D INTOXICATION: ICD-10-CM

## 2022-06-14 LAB — VITAMIN B1, WHOLE BLOOD, 66250: 190.1 NMOL/L (ref 66.5–200)

## 2022-06-14 PROCEDURE — 99213 OFFICE O/P EST LOW 20 MIN: CPT | Performed by: NURSE PRACTITIONER

## 2022-06-14 RX ORDER — CHOLECALCIFEROL (VITAMIN D3) 125 MCG
5000 CAPSULE ORAL DAILY
Qty: 90 CAPSULE | Refills: 3 | Status: CANCELLED | OUTPATIENT
Start: 2022-06-14

## 2022-06-14 NOTE — PROGRESS NOTES
1. Have you been to the ER, urgent care clinic since your last visit? Hospitalized since your last visit? No    2. Have you seen or consulted any other health care providers outside of the [address] [place] [address] since your last visit? Include any pap smears or colon screening. Yes GI for an upperGI     Bariatric Postoperative Progress Note    CC: 12 Month LGBP Follow Up    Rosetta Greenberg is a 43 y.o. female now 1 year status post laparoscopic gastric bypass surgery performed on 6/23/21. Doing well overall. Currently eating fish, chicken, salads, vegetables, yogurt, and nuts. Taking in  oz water,  70-80 g protein. 20 min of activity 2 days a week, focusing on strength training. Bowel movements are alternating constipation and regularity,  Using Miralax. She is compliant with multivitamins and calcium due to previous recommendations   Exacerbation of asthma in January which she was put on multiple rounds of PO steroids. Reflux has worsened since with pulmonology crediting worsening of asthma to reflux. EGD with Dr. Ja Quevedo 5/26 showed ulcer at [address], moderate gastritis, and 2 cm hiatal hernia, was switched to Protonix BID from Nexium. Also experiencing nausea and dysphagia so she is only eating soft foods. Denies any NSAID, ETOH, or tobacco use.    Goal weight: 155     Weight Loss Metrics 6/14/2022 6/14/2022 12/14/2021 12/14/2021 9/28/2021 9/28/2021 9/13/2021   Pre op / Initial Wt 266 - 266 - 266 - 266   Today's Wt - 169 lb 9.6 oz - 183 lb - 213 lb -   BMI - 25.79 kg/m2 - 27.83 kg/m2 - 32.39 kg/m2 -   Ideal Body Wt 143 - 143 - 143 - 143   Excess Body Wt 123 - 123 - 123 - 123   Goal Wt 168 - 168 - 168 - 168   Wt loss to date 96.4 - 83 - 53 - 47   % Wt Loss 0.98 - 0.84 - 0.54 - 0.48   80% EBW 98.4 - 98.4 - 98.4 - 98.4     Comorbidities:  Hypertension: not applicable  Diabetes: not applicable  Obstructive Sleep Apnea: resolved  Hyperlipidemia: not applicable  Stress Urinary Incontinence: resolved  Gastroesophageal Reflux: worsened  Weight related arthropathy:improved        Past Medical History:   Diagnosis Date    Anxiety     Asthma     COVID-19 vaccine series completed 02/2021    Environmental allergies     GERD (gastroesophageal reflux disease)     HLD (hyperlipidemia)     HSV-2 infection     Nausea & vomiting     MILD NAUSEA ONLY    OCD (obsessive compulsive disorder)     PCOS (polycystic ovarian syndrome)     Prediabetes     Vitamin D deficiency        Past Surgical History:   Procedure Laterality Date    HX BREAST LUMPECTOMY Left 2013    HX CHOLECYSTECTOMY      HX GI      gastric bypass    HX KNEE ARTHROSCOPY Right 2010    HX OTHER SURGICAL  06/25/2019    excsion of pilonida cyst     HX WISDOM TEETH EXTRACTION  2003       Current Outpatient Medications   Medication Sig Dispense Refill    polyethylene glycol (Miralax) 17 gram/dose powder Take 17 g by mouth daily.  medroxyPROGESTERone (depo-SubQ provera 104) 104 mg/0.65 mL injection 104 mg by SubCUTAneous route.  multivitamin with iron (FLINTSTONES) chewable tablet Take 1 Tablet by mouth daily. 2x day      OTHER Calcium citrate 600 milligram 3x day      montelukast (SINGULAIR) 10 mg tablet Take 10 mg by mouth nightly.  EPIPEN 2-BOB 0.3 mg/0.3 mL injection INJECT 1 PEN INTO LATERAL THIGH FOR SYMPTOMS OF ANAPHYLAXIS.  0    albuterol sulfate (PROAIR RESPICLICK) 90 mcg/actuation aepb Take 2 Puffs by inhalation every four (4) hours as needed. 1 Inhaler 12    cetirizine (ZYRTEC) 10 mg tablet Take  by mouth.  ondansetron (ZOFRAN ODT) 4 mg disintegrating tablet Take 1 Tablet by mouth every eight (8) hours as needed for Nausea or Vomiting. 30 Tablet 1       Allergies   Allergen Reactions    Latex Atopic Dermatitis    Nubain [Nalbuphine] Hives    Sulfa (Sulfonamide Antibiotics) Hives    Iodine Hives    Shellfish Derived Hives       ROS:  Review of Systems   Constitutional: Positive for weight loss.  Negative for malaise/fatigue. Eyes: Negative. Respiratory: Positive for cough (asthma) and shortness of breath (asthma). Cardiovascular: Negative for chest pain and palpitations. Gastrointestinal: Positive for constipation, heartburn and nausea. Negative for abdominal pain, blood in stool, diarrhea, melena and vomiting. Skin: Negative for itching and rash. Skin irritation in between thighs from excess skin chafing   Neurological: Negative for dizziness, tingling, loss of consciousness, weakness and headaches. Physicial Exam:  Visit Vitals  /78 (BP 1 Location: Left arm, BP Patient Position: Sitting, BP Cuff Size: Adult)   Pulse 87   Temp 97.9 °F (36.6 °C)   Ht 5' 8\" (1.727 m)   Wt 76.9 kg (169 lb 9.6 oz)   SpO2 97%   BMI 25.79 kg/m²     Physical Exam  Vitals and nursing note reviewed. Constitutional:       Appearance: Normal appearance. HENT:      Head: Normocephalic and atraumatic. Pulmonary:      Effort: Pulmonary effort is normal.   Musculoskeletal:         General: Normal range of motion. Skin:     General: Skin is warm and dry. Neurological:      General: No focal deficit present. Mental Status: She is alert and oriented to person, place, and time. Psychiatric:         Mood and Affect: Mood normal.         Behavior: Behavior normal.       Labs:   Recent Results (from the past 672 hour(s))   METABOLIC PANEL, COMPREHENSIVE    Collection Time: 06/09/22  8:55 AM   Result Value Ref Range    Glucose 84 70 - 99 mg/dL    BUN 31 (H) 6 - 22 mg/dL    Creatinine 0.8 0.5 - 1.2 mg/dL    Sodium 139 133 - 145 mmol/L    Potassium 5.1 3.5 - 5.5 mmol/L    Chloride 102 98 - 110 mmol/L    CO2 26 20 - 32 mmol/L    AST (SGOT) 32 10 - 37 U/L    ALT (SGPT) 48 (H) 5 - 40 U/L    Alk.  phosphatase 76 25 - 115 U/L    Bilirubin, total 0.3 0.2 - 1.2 mg/dL    Calcium 9.5 8.4 - 10.5 mg/dL    Protein, total 7.0 6.4 - 8.3 g/dL    Albumin 4.7 3.5 - 5.0 g/dL    A-G Ratio 2.0 1.1 - 2.6 ratio    Globulin 2.3 2.0 - 4.0 g/dL    Anion gap 11.0 3.0 - 15.0 mmol/L    GLOMERULAR FILTRATION RATE >60.0 >60.0 mL/min/1.73 sq.m. FOLATE    Collection Time: 06/09/22  8:55 AM   Result Value Ref Range    Folate >20.00 >=3.10 ng/mL   IRON    Collection Time: 06/09/22  8:55 AM   Result Value Ref Range    Iron 97 30 - 160 mcg/dL   MAGNESIUM    Collection Time: 06/09/22  8:55 AM   Result Value Ref Range    Magnesium 2.1 1.6 - 2.5 mg/dL   VITAMIN B12    Collection Time: 06/09/22  8:55 AM   Result Value Ref Range    Vitamin B12 >2000 (H) 211 - 911 pg/mL   FERRITIN    Collection Time: 06/09/22  8:55 AM   Result Value Ref Range    Ferritin 37 10 - 291 ng/mL   VITAMIN D, 25 HYDROXY    Collection Time: 06/09/22  8:55 AM   Result Value Ref Range    VITAMIN D, 25-HYDROXY 148.0 (H) 32.0 - 100.0 ng/mL   VITAMIN B1, WHOLE BLOOD    Collection Time: 06/09/22  8:55 AM   Result Value Ref Range    VITAMIN B1, WHOLE BLOOD 190.1 66.5 - 200.0 nmol/L   CBC WITH AUTOMATED DIFF    Collection Time: 06/09/22  8:55 AM   Result Value Ref Range    WBC 7.5 4.0 - 11.0 K/uL    RBC 4.22 3.80 - 5.20 M/uL    HGB 13.1 11.7 - 15.5 g/dL    HCT 40.4 35.1 - 46.5 %    MCV 96 80 - 99 fL    MCH 31 26 - 34 pg    MCHC 32 31 - 36 g/dL    RDW 13.3 10.0 - 15.5 %    PLATELET 134 694 - 130 K/uL    MPV 11.6 9.0 - 13.0 fL    NEUTROPHILS 65 40 - 75 %    Lymphocytes 23 20 - 45 %    MONOCYTES 10 3 - 12 %    EOSINOPHILS 0 0 - 6 %    BASOPHILS 1 0 - 2 %    ABS. NEUTROPHILS 4.9 1.8 - 7.7 K/uL    ABSOLUTE LYMPHOCYTE COUNT 1.8 1.0 - 4.8 K/uL    ABS. MONOCYTES 0.8 0.1 - 1.0 K/uL    ABS. EOSINOPHILS 0.0 0.0 - 0.5 K/uL    ABS. BASOPHILS 0.0 0.0 - 0.2 K/uL     Barium Swallow 5/3/2022  A small to moderate hiatal hernia is present. Moderate spontaneous reflux the level of the thoracic inlet is present.      EGD 5/26/2022  -Normal mucosa in the esophagus (biopsy)  -Previous surgery in the fundus  -Duodenum was not visualized due to postsurgical status  -Visualized jejunal mucosa was normal in appearance  -Ulcer in the anastomosis (gastrojejunal anastomosis). Moderate gastritis in the remnant stomach (biopsy-H Pylori)  -Small 2 cm hiatal hernia    CT Abd/Pelvis with Contrast 6/10/2022  1. No acute abnormality in the abdomen or pelvis. 2. Status post Barb-en-Y gastric bypass without evidence of complication. Assessment/Plan:   She is currently 1 year s/p laparoscopic gastric bypass surgery with a total weight loss of 96 lbs to date, doing well. Labs were reviewed and pt was instructed to continue MVI and calcium supplementation. Stopped vitamin D last week after discussion over telephone. Will recheck D in three months. Continue with PPI BID and Zofran prn. Proceed with follow ups with GI. We have reviewed the components of a successful postoperative course including requirement for a high protein, low carbohydrate diet, 64 oz a day of zero calorie liquids, daily vitamin supplementation, daily exercise (150 mis/week), regular follow-up, and participation in support groups. Refer to registered dietitian for more detailed medical nutrition therapy as needed. The primary encounter diagnosis was Post-resection malabsorption. Diagnoses of S/P gastric bypass, Hx of obesity, BMI 25.0-25.9,adult, Nausea, Gastroesophageal reflux disease without esophagitis, and Vitamin D intoxication were also pertinent to this visit. Follow-up and Dispositions    · Return in about 1 year (around 6/14/2023) for Annual Bariatric Follow Up, Recheck D in 3 months. with labs, sooner as needed.   Ryan Singh NP

## 2022-06-15 DIAGNOSIS — K91.2 POST-RESECTION MALABSORPTION: ICD-10-CM

## 2022-06-15 DIAGNOSIS — R11.0 NAUSEA: ICD-10-CM

## 2022-06-15 DIAGNOSIS — E67.3 VITAMIN D INTOXICATION: ICD-10-CM

## 2022-06-15 DIAGNOSIS — K21.9 GASTROESOPHAGEAL REFLUX DISEASE WITHOUT ESOPHAGITIS: ICD-10-CM

## 2022-06-15 DIAGNOSIS — Z86.39 HX OF OBESITY: ICD-10-CM

## 2022-06-15 DIAGNOSIS — Z98.84 S/P GASTRIC BYPASS: ICD-10-CM

## 2022-08-20 LAB
25(OH)D3 SERPL-MCNC: 44.5 NG/ML (ref 32–100)
A-G RATIO,AGRAT: 1.7 RATIO (ref 1.1–2.6)
ABSOLUTE LYMPHOCYTE COUNT, 10803: 2 K/UL (ref 1–4.8)
ALBUMIN SERPL-MCNC: 4.3 G/DL (ref 3.5–5)
ALP SERPL-CCNC: 74 U/L (ref 25–115)
ALT SERPL-CCNC: 23 U/L (ref 5–40)
ANION GAP SERPL CALC-SCNC: 14 MMOL/L (ref 3–15)
AST SERPL W P-5'-P-CCNC: 20 U/L (ref 10–37)
BASOPHILS # BLD: 0.1 K/UL (ref 0–0.2)
BASOPHILS NFR BLD: 1 % (ref 0–2)
BILIRUB SERPL-MCNC: 0.3 MG/DL (ref 0.2–1.2)
BUN SERPL-MCNC: 23 MG/DL (ref 6–22)
CALCIUM SERPL-MCNC: 9.3 MG/DL (ref 8.4–10.5)
CHLORIDE SERPL-SCNC: 103 MMOL/L (ref 98–110)
CO2 SERPL-SCNC: 22 MMOL/L (ref 20–32)
CREAT SERPL-MCNC: 0.6 MG/DL (ref 0.5–1.2)
EOSINOPHIL # BLD: 0.1 K/UL (ref 0–0.5)
EOSINOPHIL NFR BLD: 1 % (ref 0–6)
ERYTHROCYTE [DISTWIDTH] IN BLOOD BY AUTOMATED COUNT: 13.4 % (ref 10–15.5)
FE % SATURATION,PSAT: 24 % (ref 20–50)
FERRITIN SERPL-MCNC: 51 NG/ML (ref 10–291)
FOLATE,FOL: >20 NG/ML
GLOBULIN,GLOB: 2.6 G/DL (ref 2–4)
GLOMERULAR FILTRATION RATE: >60 ML/MIN/1.73 SQ.M.
GLUCOSE SERPL-MCNC: 58 MG/DL (ref 70–99)
GRANULOCYTES,GRANS: 59 % (ref 40–75)
HCT VFR BLD AUTO: 41.4 % (ref 35.1–46.5)
HGB BLD-MCNC: 13.7 G/DL (ref 11.7–15.5)
IRON,IRN: 105 MCG/DL (ref 30–160)
LYMPHOCYTES, LYMLT: 27 % (ref 20–45)
MAGNESIUM SERPL-MCNC: 2.1 MG/DL (ref 1.6–2.5)
MCH RBC QN AUTO: 31 PG (ref 26–34)
MCHC RBC AUTO-ENTMCNC: 33 G/DL (ref 31–36)
MCV RBC AUTO: 94 FL (ref 80–99)
MONOCYTES # BLD: 0.9 K/UL (ref 0.1–1)
MONOCYTES NFR BLD: 12 % (ref 3–12)
NEUTROPHILS # BLD AUTO: 4.4 K/UL (ref 1.8–7.7)
PLATELET # BLD AUTO: 272 K/UL (ref 140–440)
PMV BLD AUTO: 11.3 FL (ref 9–13)
POTASSIUM SERPL-SCNC: 4.4 MMOL/L (ref 3.5–5.5)
PROT SERPL-MCNC: 6.9 G/DL (ref 6.4–8.3)
RBC # BLD AUTO: 4.39 M/UL (ref 3.8–5.2)
SODIUM SERPL-SCNC: 139 MMOL/L (ref 133–145)
TIBC,TIBC: 446 MCG/DL (ref 228–428)
UIBC SERPL-MCNC: 341 MCG/DL (ref 110–370)
VIT B12 SERPL-MCNC: 1058 PG/ML (ref 211–911)
VITAMIN B1, WHOLE BLOOD, 66250: 200.8 NMOL/L (ref 66.5–200)
WBC # BLD AUTO: 7.5 K/UL (ref 4–11)

## 2022-11-30 ENCOUNTER — PATIENT MESSAGE (OUTPATIENT)
Dept: SURGERY | Age: 37
End: 2022-11-30

## 2023-05-01 NOTE — NURSE NAVIGATOR
Per MBSAQIP requirements:  Letter and email sent requesting follow up appointment. [facility] [address] 89      Dear Patient,    Your health is our main concern. It is important for your health to have follow-up lab work and to see your surgeon at 3 months, 6 months and annually after your weight loss surgery. Additionally, the Department of bariatric Surgery at our hospital is a member of the Metabolic and Bariatric Surgery Accreditation and Quality Improvement Program [facility]). As a participant in this program, we gather information on the outcomes of our patients after surgery. Please call the office for a follow up appointment at [phone] [facility]) or [phone] [facility]). If you have moved out of the area or have changed surgeons please call us and let us know the name of your doctor. Your health and feedback are important to us. We greatly appreciate your response.        Thank you,  [facility] [address] [facility]

## 2023-05-30 ENCOUNTER — TELEPHONE (OUTPATIENT)
Age: 38
End: 2023-05-30

## 2023-05-30 DIAGNOSIS — K91.2 POSTSURGICAL MALABSORPTION, NOT ELSEWHERE CLASSIFIED: Primary | ICD-10-CM

## 2023-05-30 RX ORDER — ONDANSETRON 4 MG/1
TABLET, ORALLY DISINTEGRATING ORAL
Qty: 30 TABLET | Refills: 1 | Status: SHIPPED | OUTPATIENT
Start: 2023-05-30

## 2023-06-06 LAB
A/G RATIO: 1.6 RATIO (ref 1.1–2.6)
ALBUMIN SERPL-MCNC: 4.4 G/DL (ref 3.5–5)
ALP BLD-CCNC: 85 U/L (ref 25–115)
ALT SERPL-CCNC: 13 U/L (ref 5–40)
ANION GAP SERPL CALCULATED.3IONS-SCNC: 12 MMOL/L (ref 3–15)
AST SERPL-CCNC: 16 U/L (ref 10–37)
BASOPHILS # BLD: 1 % (ref 0–2)
BASOPHILS ABSOLUTE: 0 K/UL (ref 0–0.2)
BILIRUB SERPL-MCNC: 0.4 MG/DL (ref 0.2–1.2)
BUN BLDV-MCNC: 10 MG/DL (ref 6–22)
CALCIUM SERPL-MCNC: 10 MG/DL (ref 8.4–10.5)
CHLORIDE BLD-SCNC: 104 MMOL/L (ref 98–110)
CO2: 24 MMOL/L (ref 20–32)
CREAT SERPL-MCNC: 0.7 MG/DL (ref 0.5–1.2)
EOSINOPHIL # BLD: 1 % (ref 0–6)
EOSINOPHILS ABSOLUTE: 0.1 K/UL (ref 0–0.5)
FERRITIN: 25 NG/ML (ref 10–291)
FOLATE: >20 NG/ML
GLOBULIN: 2.7 G/DL (ref 2–4)
GLOMERULAR FILTRATION RATE: >60 ML/MIN/1.73 SQ.M.
GLUCOSE: 68 MG/DL (ref 70–99)
HCT VFR BLD CALC: 38.4 % (ref 35.1–46.5)
HEMOGLOBIN: 12.8 G/DL (ref 11.7–15.5)
IRON: 48 MCG/DL (ref 30–160)
LYMPHOCYTES # BLD: 41 % (ref 20–45)
LYMPHOCYTES ABSOLUTE: 2.6 K/UL (ref 1–4.8)
MCH RBC QN AUTO: 31 PG (ref 26–34)
MCHC RBC AUTO-ENTMCNC: 33 G/DL (ref 31–36)
MCV RBC AUTO: 93 FL (ref 80–99)
MONOCYTES ABSOLUTE: 0.7 K/UL (ref 0.1–1)
MONOCYTES: 11 % (ref 3–12)
NEUTROPHILS ABSOLUTE: 3 K/UL (ref 1.8–7.7)
NEUTROPHILS: 47 % (ref 40–75)
PDW BLD-RTO: 12.9 % (ref 10–15.5)
PLATELET # BLD: 287 K/UL (ref 140–440)
PMV BLD AUTO: 11.2 FL (ref 9–13)
POTASSIUM SERPL-SCNC: 4.1 MMOL/L (ref 3.5–5.5)
RBC: 4.11 M/UL (ref 3.8–5.2)
SODIUM BLD-SCNC: 140 MMOL/L (ref 133–145)
THIAMINE BLOOD: 143 NMOL/L (ref 78–185)
TOTAL PROTEIN: 7.1 G/DL (ref 6.4–8.3)
VITAMIN B-12: >2000 PG/ML (ref 211–911)
VITAMIN D 25-HYDROXY: 61 NG/ML (ref 32–100)
WBC: 6.5 K/UL (ref 4–11)

## 2023-06-27 ENCOUNTER — OFFICE VISIT (OUTPATIENT)
Age: 38
End: 2023-06-27
Payer: COMMERCIAL

## 2023-06-27 VITALS
WEIGHT: 167 LBS | HEIGHT: 68 IN | DIASTOLIC BLOOD PRESSURE: 83 MMHG | HEART RATE: 74 BPM | TEMPERATURE: 97 F | BODY MASS INDEX: 25.31 KG/M2 | SYSTOLIC BLOOD PRESSURE: 113 MMHG

## 2023-06-27 DIAGNOSIS — K91.2 POST-RESECTION MALABSORPTION: Primary | ICD-10-CM

## 2023-06-27 DIAGNOSIS — Z86.39 HX OF OBESITY: ICD-10-CM

## 2023-06-27 DIAGNOSIS — Z98.84 S/P GASTRIC BYPASS: ICD-10-CM

## 2023-06-27 PROCEDURE — 99213 OFFICE O/P EST LOW 20 MIN: CPT | Performed by: NURSE PRACTITIONER

## 2023-06-27 RX ORDER — LORAZEPAM 1 MG/1
1 TABLET ORAL
COMMUNITY
Start: 2022-05-03

## 2023-06-27 RX ORDER — CALCIUM CITRATE 1040MG
TABLET ORAL
COMMUNITY

## 2023-06-27 RX ORDER — GABAPENTIN 300 MG/1
300 CAPSULE ORAL
COMMUNITY

## 2023-06-27 ASSESSMENT — ENCOUNTER SYMPTOMS
BLOOD IN STOOL: 0
ABDOMINAL PAIN: 0
CONSTIPATION: 0
NAUSEA: 1
DIARRHEA: 0
VOMITING: 0

## 2023-06-30 ASSESSMENT — ENCOUNTER SYMPTOMS
ABDOMINAL DISTENTION: 0
SHORTNESS OF BREATH: 0
EYES NEGATIVE: 1
WHEEZING: 0
COUGH: 0

## 2024-06-21 NOTE — TELEPHONE ENCOUNTER
Patient called reporting that she has a new inflamed and reddened area at her surgical site that is painful. Patient denied any fever, chills, or body ache however expressed concern with new complaint. She was offered an appointment this morning which she accepted for 0930 with Dr. Carmen Boyer.
Waiting ambulance service

## 2024-07-24 ENCOUNTER — TELEPHONE (OUTPATIENT)
Age: 39
End: 2024-07-24

## 2024-07-24 DIAGNOSIS — K91.2 POSTSURGICAL MALABSORPTION: ICD-10-CM

## 2024-07-24 DIAGNOSIS — Z98.84 S/P GASTRIC BYPASS: Primary | ICD-10-CM

## (undated) DEVICE — GLOVE SURG SZ 7 L11.33IN FNGR THK9.8MIL STRW LTX POLYMER

## (undated) DEVICE — SOLUTION IRRIG 1000ML H2O STRL BLT

## (undated) DEVICE — TRUE CONTENT TO BE POPULATED AS PART OF REBRANDING: Brand: ARGYLE

## (undated) DEVICE — SOL IRR NACL 0.9% 500ML POUR --

## (undated) DEVICE — RELOAD STPL L60MM H1.5-3.6MM REG TISS BLU GRIPPING SURF B

## (undated) DEVICE — HANDLE PRB DIA5MM HND CTRL PSTL GRP ENDOPATH PRB + II

## (undated) DEVICE — TISSUE RETRIEVAL SYSTEM: Brand: INZII RETRIEVAL SYSTEM

## (undated) DEVICE — INSULATED BLADE ELECTRODE: Brand: EDGE

## (undated) DEVICE — SOLUTION LACTATED RINGERS INJECTION USP

## (undated) DEVICE — 3M™ TEGADERM™ TRANSPARENT FILM DRESSING FRAME STYLE, 1626W, 4 IN X 4-3/4 IN (10 CM X 12 CM), 50/CT 4CT/CASE: Brand: 3M™ TEGADERM™

## (undated) DEVICE — SUT SLK 2-0SH 30IN BLK --

## (undated) DEVICE — SUTURE VCRL SZ 2-0 L54IN ABSRB VLT W/O NDL POLYGLACTIN 910 J618H

## (undated) DEVICE — PAD,ABDOMINAL,5"X9",STERILE,LF,1/PK: Brand: MEDLINE INDUSTRIES, INC.

## (undated) DEVICE — TAPE ADH W3INXL10YD PLAS TRNSPAR H2O RESIST HYPOALRG CURAD

## (undated) DEVICE — SUTURE VCRL SZ 3-0 L27IN ABSRB VLT L26MM SH 1/2 CIR J316H

## (undated) DEVICE — BLANKET WRM W29.9XL79.1IN UP BODY FORC AIR MISTRAL-AIR

## (undated) DEVICE — STERILE POLYISOPRENE POWDER-FREE SURGICAL GLOVES: Brand: PROTEXIS

## (undated) DEVICE — PACK PROCEDURE SURG LAPAROSCOPY 17X7 MM BRTRC PRIMUS

## (undated) DEVICE — TROCAR ENDOSCP BLDELSS 12X100 MM W/ HNDL STBL SL OPT TIP

## (undated) DEVICE — 4-PORT MANIFOLD: Brand: NEPTUNE 2

## (undated) DEVICE — SET SUCT IRR TIP DISP STRYKEFLOW2

## (undated) DEVICE — RELOAD STPL L60MM H1-2.6MM MESENTERY THN TISS WHT 6 ROW

## (undated) DEVICE — GAUZE SPONGES,8 PLY: Brand: CURITY

## (undated) DEVICE — SOFT SILICONE HYDROCELLULAR SACRUM DRESSING WITH LOCK AWAY LAYER: Brand: ALLEVYN LIFE SACRUM (LARGE) PACK OF 10

## (undated) DEVICE — SOLUTION IV 1000ML 0.9% SOD CHL

## (undated) DEVICE — SCISSORS ENDOSCP DIA5MM CRV MPLR CAUT W/ RATCH HNDL

## (undated) DEVICE — COVER,LIGHT HANDLE,FLX,1/PK: Brand: MEDLINE INDUSTRIES, INC.

## (undated) DEVICE — DEPAUL MAJOR PROCEDURE PACK: Brand: MEDLINE INDUSTRIES, INC.

## (undated) DEVICE — STAPLE INT WHT BLU G GRN BLK REINF FOR ENDOPATH ECHELON FLX

## (undated) DEVICE — CONTROL SYRINGE LUER-LOCK TIP: Brand: MONOJECT

## (undated) DEVICE — INTENDED FOR TISSUE SEPARATION, AND OTHER PROCEDURES THAT REQUIRE A SHARP SURGICAL BLADE TO PUNCTURE OR CUT.: Brand: BARD-PARKER SAFETY BLADES SIZE 11, STERILE

## (undated) DEVICE — TROCAR ENDOSCP L100MM DIA12MM STBL SL BLDELSS ENDOPATH XCEL

## (undated) DEVICE — (D)PREP SKN CHLRAPRP APPL 26ML -- CONVERT TO ITEM 371833

## (undated) DEVICE — SHEAR HARMONIC ACET 5MMX36CM -- ACE PLUS

## (undated) DEVICE — STAPLER SKIN L440MM 32MM LNG 12 FIRING B FRM PWR + GRIPPING

## (undated) DEVICE — STRIP,CLOSURE,WOUND,MEDI-STRIP,1/2X4: Brand: MEDLINE

## (undated) DEVICE — TRAY,URINE METER,100% SILICONE,16FR10ML: Brand: MEDLINE

## (undated) DEVICE — REM POLYHESIVE ADULT PATIENT RETURN ELECTRODE: Brand: VALLEYLAB

## (undated) DEVICE — NEEDLE HYPO 25GA L1.5IN BVL ORIENTED ECLIPSE

## (undated) DEVICE — SUTURE MCRYL SZ 4-0 L27IN ABSRB UD L24MM PS-1 3/8 CIR PRIM Y935H

## (undated) DEVICE — TROCAR LAP L100MM DIA5MM BLDELSS W/ STBL SL ENDOPATH XCEL

## (undated) DEVICE — GARMENT,MEDLINE,DVT,INT,THIGH,M, GEN2: Brand: MEDLINE

## (undated) DEVICE — TROCAR ENDOSCP SHFT L100MM DIA12MM INTEGR STBL ENDOPATH

## (undated) DEVICE — BANDAGE,GAUZE,BULKEE II,2.25"X3YD,STRL: Brand: MEDLINE

## (undated) DEVICE — 3M™ STERI-STRIP™ COMPOUND BENZOIN TINCTURE 40 BAGS/CARTON 4 CARTONS/CASE C1544: Brand: 3M™ STERI-STRIP™